# Patient Record
Sex: FEMALE | Race: WHITE | NOT HISPANIC OR LATINO | Employment: OTHER | URBAN - METROPOLITAN AREA
[De-identification: names, ages, dates, MRNs, and addresses within clinical notes are randomized per-mention and may not be internally consistent; named-entity substitution may affect disease eponyms.]

---

## 2018-04-19 ENCOUNTER — CONVERSION ENCOUNTER (OUTPATIENT)
Dept: MAMMOGRAPHY | Facility: HOSPITAL | Age: 73
End: 2018-04-19

## 2018-07-03 ENCOUNTER — CONVERSION ENCOUNTER (OUTPATIENT)
Dept: NEUROLOGY | Facility: CLINIC | Age: 73
End: 2018-07-03

## 2018-07-03 ENCOUNTER — OFFICE VISIT CONVERTED (OUTPATIENT)
Dept: NEUROSURGERY | Facility: CLINIC | Age: 73
End: 2018-07-03
Attending: PHYSICIAN ASSISTANT

## 2019-07-29 ENCOUNTER — HOSPITAL ENCOUNTER (OUTPATIENT)
Dept: MAMMOGRAPHY | Facility: HOSPITAL | Age: 74
Discharge: HOME | End: 2019-07-29
Attending: INTERNAL MEDICINE

## 2020-08-10 ENCOUNTER — HOSPITAL ENCOUNTER (OUTPATIENT)
Dept: MAMMOGRAPHY | Facility: HOSPITAL | Age: 75
Discharge: HOME | End: 2020-08-10
Attending: INTERNAL MEDICINE

## 2021-03-10 ENCOUNTER — OFFICE VISIT CONVERTED (OUTPATIENT)
Dept: ORTHOPEDIC SURGERY | Facility: CLINIC | Age: 76
End: 2021-03-10
Attending: ORTHOPAEDIC SURGERY

## 2021-03-22 ENCOUNTER — OFFICE VISIT CONVERTED (OUTPATIENT)
Dept: ORTHOPEDIC SURGERY | Facility: CLINIC | Age: 76
End: 2021-03-22
Attending: ORTHOPAEDIC SURGERY

## 2021-03-22 ENCOUNTER — CONVERSION ENCOUNTER (OUTPATIENT)
Dept: ORTHOPEDIC SURGERY | Facility: CLINIC | Age: 76
End: 2021-03-22

## 2021-04-06 ENCOUNTER — HOSPITAL ENCOUNTER (OUTPATIENT)
Dept: VACCINE CLINIC | Facility: HOSPITAL | Age: 76
Discharge: HOME | End: 2021-04-06
Attending: INTERNAL MEDICINE

## 2021-04-27 ENCOUNTER — HOSPITAL ENCOUNTER (OUTPATIENT)
Dept: VACCINE CLINIC | Facility: HOSPITAL | Age: 76
Discharge: HOME | End: 2021-04-27
Attending: INTERNAL MEDICINE

## 2021-05-10 NOTE — H&P
History and Physical      Patient Name: Marnie Parra   Patient ID: [id number]   Sex: Female   YOB: 1945    Primary Care Provider: Nicolas Velasquez MD   Referring Provider: Stanton Ty    Visit Date: March 10, 2021    Provider: Dennis Álvarez MD   Location: [facility]   Location Address: [address] [place] [address]  [id number]   Location Phone: [phone]          Chief Complaint  · Left knee pain      History Of Present Illness  Marnie Parra is a 75 year old /White female who presents today to [facility].      Patient presents today for an evaluation of right knee pain. Patient presents today with MRI results and X-rays results of her right knee. She states she had a cystectomy, because she had cancer, and that surgery left her with neuropathy since 2016. She states pain in her right knee since 2016. She states in the last year she has been having increasing right knee pain. She woke up in September to excessive swelling and heat on her right knee. Patient went and saw Dr. Rai. She had first 25cc aspirated from her right knee, with no relief. She had her knee aspirated again and they got off 27cc of fluid and received a steroid injection, with no relief. She states she is now on prednisone with no relief of pain in her right knee.       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; Vaginitis, atrophic         Past Surgical History  Breast biopsy, left breast; Cystoscopy; Hysterectomy         Medication List  acetaminophen 500 mg oral tablet; aspirin 81 mg oral tablet,delayed release (DR/EC); folic acid 1 mg oral tablet; gabapentin 100 mg oral capsule; Norvasc 2.5 mg oral tablet; Protonix 20 mg oral tablet,delayed release (DR/EC); Zoloft 25 mg oral tablet  "        Allergy List  Dilaudid; Levaquin; Neurontin; propranolol       Allergies Reconciled  Family Medical History  Heart Disease; Hypertension; Renal Calculus         Social History  Alcohol (Never); ; Retired; Tobacco (Current every day)         Review of Systems  · Constitutional  o Denies  o : fever, chills, weight loss  · Cardiovascular  o Denies  o : chest pain, shortness of breath  · Gastrointestinal  o Denies  o : liver disease, heartburn, nausea, blood in stools  · Genitourinary  o Denies  o : painful urination, blood in urine  · Integument  o Denies  o : rash, itching  · Neurologic  o Denies  o : headache, weakness, loss of consciousness  · Musculoskeletal  o Denies  o : painful, swollen joints  · Psychiatric  o Denies  o : drug/alcohol addiction, anxiety, depression      Vitals  Date Time BP Position Site L\R Cuff Size HR RR TEMP (F) WT  HT  BMI kg/m2 BSA m2 O2 Sat FR L/min FiO2 HC       03/10/2021 01:58 PM         150lbs 4oz 5'  8\" 22.85 1.81             Physical Examination  · Constitutional  o Appearance  o : well developed, well-nourished, no obvious deformities present  · Head and Face  o Head  o :   § Inspection  § : normocephalic  o Face  o :   § Inspection  § : no facial lesions  · Eyes  o Conjunctivae  o : conjunctivae normal  o Sclerae  o : sclerae white  · Ears, Nose, Mouth and Throat  o Ears  o :   § External Ears  § : appearance within normal limits  § Hearing  § : intact  o Nose  o :   § External Nose  § : appearance normal  · Neck  o Inspection/Palpation  o : normal appearance  o Range of Motion  o : full range of motion  · Respiratory  o Respiratory Effort  o : breathing unlabored  o Inspection of Chest  o : normal appearance  o Auscultation of Lungs  o : no audible wheezing or rales  · Cardiovascular  o Heart  o : regular rate  · Gastrointestinal  o Abdominal Examination  o : soft and non-tender  · Skin and Subcutaneous Tissue  o General Inspection  o : intact, no " rashes  · Psychiatric  o General  o : Alert and oriented x3  o Judgement and Insight  o : judgment and insight intact  o Mood and Affect  o : mood normal, affect appropriate  · Left Knee  o Inspection  o : Calf supple, non-tender. Ambulation assistance with a cane. Weight bearing. Sensation grossly intact. Neurovascular intact. Skin intact. Tender lateral joint line. Mild tenderness of the medial joint line. Swelling. No skin discoloration or atrophy. Stable to valgus/varus stress. Good strength in quadriceps, hamstrings, dorsiflexors, and plantar flexors. Full flexion and extension.   · In Office Procedures  o View  o : LAT/SUNRISE/STANDING  o Site  o : left knee  o Indication  o : left knee pain  o Study  o : X-rays ordered, taken in the office, and reviewed today.  o Xray  o : Moderate degenerative changes of the right knee. No fracture or dislocation.   o Comparative Data  o : No comparative data found  · Imaging  o Imaging  o : 12/29/2021 XRAY LEFT KNEE: Mild osteoarthritis with suggestion of joint effusion and tricompartmental joint space narrowing. [1/13/2021 [MRI LEFT KNEE] Small degenerative tear mid body segment lateral meniscus with a small free margin radial component and horizontal cleavage component. Moderate (up to 3.5 cm) amount of marrow edema posterior nonweightbearing lateral femoral condyle which is nonspecific and could represent bone contusion, altered stress, transient osteoporosis or early AVN. No subchondral fracture or collapse. Diffuse chondrosis lateral compartment with 1 cm focus of grade IV chondromalacia lateral tibial plateau and suspected small (5mm) foci of grade IV chondromalacia lateral femoral condyle. Small joint effusion and popliteal cyst with diffuse synovial thickening within the popliteal cyst.           Assessment  · Primary osteoarthritis of left knee     715.16/M17.12  · Left knee pain, unspecified chronicity     719.46/M25.562      Plan  · Orders  o Knee (Left) [facility]  Preferred View ([id number]) - 719.46/M25.562 - 03/10/2021  · Medications  o Medications have been Reconciled  o Transition of Care or Provider Policy  · Instructions  o Dr. Álvarez saw and examined the patient and agrees with plan.   o X-rays reviewed by Dr. Álvarez.  o Reviewed the patient's Past Medical, Social, and Family history as well as the ROS at today's visit, no changes.  o Call or return if worsening symptoms.  o Follow Up PRN.  o The above service was scribed by Felipa Gupta on my behalf and I attest to the accuracy of the note. fidel  o Discussed diagnosis and treatment plans with the patient. Patient opted to get approval for viscosupplementation approval.            Electronically Signed by: Felipa Gupta-, Other -Author on March 11, 2021 11:33:23 AM  Electronically Co-signed by: Dennis Álvarez MD -Reviewer on March 14, 2021 05:39:49 PM

## 2021-05-14 VITALS — WEIGHT: 150 LBS | HEIGHT: 68 IN | BODY MASS INDEX: 22.73 KG/M2

## 2021-05-14 VITALS — WEIGHT: 150.25 LBS | HEIGHT: 68 IN | BODY MASS INDEX: 22.77 KG/M2

## 2021-05-14 NOTE — PROGRESS NOTES
Progress Note      Patient Name: Marnie Parra   Patient ID: [id number]   Sex: Female   YOB: 1945    Primary Care Provider: Nicolas Velasquez MD   Referring Provider: Stanton Ty    Visit Date: March 22, 2021    Provider: Dennis Álvarez MD   Location: [facility]   Location Address: [address] [place] [address]  [id number]   Location Phone: [phone]          Chief Complaint  · Left Knee Osteoarthritis      History Of Present Illness  Marnie Parra is a 75 year old /White female who presents today to [facility].      Patient presents today for a follow-up of left knee pain, left knee osteoarthritis. Patient has a history of left knee osteoarthritis that she has been treating conservatively. She had a cystectomy, because she had cancer, and that surgery left her with neuropathy since 2016. She states pain in her left knee since 2016. She states in the last year she has been having increasing left knee pain. She woke up in September to excessive swelling and heat on her right knee. Patient went and saw Dr. Rai. She had first 25cc aspirated from her left knee, with no relief. She had her knee aspirated again and they got off 27cc of fluid and received a steroid injection, with no relief. She states she is now on prednisone with no relief of pain in her left knee. She presents today to receive a left knee injection today.       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; Vaginitis, atrophic         Past Surgical History  Breast biopsy, left breast; Cystoscopy; Hernia; Hysterectomy         Medication List  acetaminophen 500 mg oral tablet; aspirin 81 mg oral tablet,delayed release (DR/EC); folic acid 1 mg oral tablet; gabapentin 100 mg  "oral capsule; Norvasc 2.5 mg oral tablet; Protonix 20 mg oral tablet,delayed release (DR/EC); Zoloft 25 mg oral tablet         Allergy List  Dilaudid; Levaquin; Neurontin; propranolol       Allergies Reconciled  Family Medical History  Heart Disease; Hypertension; Renal Calculus; Family history of Arthritis         Social History  Alcohol (Never); Alcohol Use (Never); lives with spouse; ; .; Recreational Drug Use (Never); Retired; Retired.; Tobacco (Current every day)         Review of Systems  · Constitutional  o Denies  o : fever, chills, weight loss  · Cardiovascular  o Denies  o : chest pain, shortness of breath  · Gastrointestinal  o Denies  o : liver disease, heartburn, nausea, blood in stools  · Genitourinary  o Denies  o : painful urination, blood in urine  · Integument  o Denies  o : rash, itching  · Neurologic  o Denies  o : headache, weakness, loss of consciousness  · Musculoskeletal  o Denies  o : painful, swollen joints  · Psychiatric  o Denies  o : drug/alcohol addiction, anxiety, depression      Vitals  Date Time BP Position Site L\R Cuff Size HR RR TEMP (F) WT  HT  BMI kg/m2 BSA m2 O2 Sat FR L/min FiO2 HC       03/22/2021 01:06 PM         150lbs 0oz 5'  8\" 22.81 1.81             Physical Examination  · Constitutional  o Appearance  o : well developed, well-nourished, no obvious deformities present  · Head and Face  o Head  o :   § Inspection  § : normocephalic  o Face  o :   § Inspection  § : no facial lesions  · Eyes  o Conjunctivae  o : conjunctivae normal  o Sclerae  o : sclerae white  · Ears, Nose, Mouth and Throat  o Ears  o :   § External Ears  § : appearance within normal limits  § Hearing  § : intact  o Nose  o :   § External Nose  § : appearance normal  · Neck  o Inspection/Palpation  o : normal appearance  o Range of Motion  o : full range of motion  · Respiratory  o Respiratory Effort  o : breathing unlabored  o Inspection of Chest  o : normal appearance  o Auscultation of " Lungs  o : no audible wheezing or rales  · Cardiovascular  o Heart  o : regular rate  · Gastrointestinal  o Abdominal Examination  o : soft and non-tender  · Skin and Subcutaneous Tissue  o General Inspection  o : intact, no rashes  · Psychiatric  o General  o : Alert and oriented x3  o Judgement and Insight  o : judgment and insight intact  o Mood and Affect  o : mood normal, affect appropriate  · Left Knee  o Inspection  o : Calf supple, non-tender. Ambulation assistance with a cane. Weight bearing. Sensation grossly intact. Neurovascular intact. Skin intact. Tender lateral joint line. Mild tenderness of the medial joint line. Swelling. No skin discoloration or atrophy. Stable to valgus/varus stress. Good strength in quadriceps, hamstrings, dorsiflexors, and plantar flexors. Full flexion and extension.   · Injection Note/Aspiration Note  o Site  o : left knee   o Procedure  o : Procedure: After educating the patient, patient gave consent for procedure. After using Chloraprep, the joint space was injected. The patient tolerated the procedure well.   o Medication  o : Synvisc One 48 mg           Assessment  · Primary osteoarthritis of left knee     715.16/M17.12      Plan  · Orders  o Hyaluronan or derivative, Synvisc or Synvisc-One, for intra-misael () - 715.16/M17.12 - 03/23/2021   Lot [id number] Exp 09 2023 [employer] Administered by GAUTAM Álvarez MD  o Knee Intra-articular Injection without US Guidance [facility] ([id number]) - 715.16/M17.12 - 03/23/2021  · Medications  o Medications have been Reconciled  o Transition of Care or Provider Policy  · Instructions  o Dr. Álvarez saw and examined the patient and agrees with plan.   o Reviewed the patient's Past Medical, Social, and Family history as well as the ROS at today's visit, no changes.  o Call or return if worsening symptoms.  o Follow Up PRN.  o The above service was scribed by Felipa Gupta on my behalf and I attest to the accuracy of the note. fidel  o Discussed treatment plans  with the patient. Patient received a left knee injection and tolerated this procedure well.             Electronically Signed by: Felipa Gupta-, Other -Author on March 25, 2021 07:51:27 AM  Electronically Co-signed by: Dennis Álvarez MD -Reviewer on March 28, 2021 01:45:11 PM

## 2021-05-16 VITALS — BODY MASS INDEX: 25.46 KG/M2 | HEART RATE: 74 BPM | HEIGHT: 68 IN | WEIGHT: 168 LBS

## 2021-06-21 ENCOUNTER — TRANSCRIBE ORDERS (OUTPATIENT)
Dept: ADMINISTRATIVE | Facility: HOSPITAL | Age: 76
End: 2021-06-21

## 2021-06-21 DIAGNOSIS — Z12.31 VISIT FOR SCREENING MAMMOGRAM: Primary | ICD-10-CM

## 2021-07-19 ENCOUNTER — TRANSCRIBE ORDERS (OUTPATIENT)
Dept: ADMINISTRATIVE | Facility: HOSPITAL | Age: 76
End: 2021-07-19

## 2021-07-19 DIAGNOSIS — C67.9 MALIGNANT NEOPLASM OF URINARY BLADDER, UNSPECIFIED SITE (HCC): Primary | ICD-10-CM

## 2021-08-02 ENCOUNTER — HOSPITAL ENCOUNTER (OUTPATIENT)
Dept: CT IMAGING | Facility: HOSPITAL | Age: 76
Discharge: HOME | End: 2021-08-02
Admitting: UROLOGY

## 2021-08-02 DIAGNOSIS — C67.9 MALIGNANT NEOPLASM OF URINARY BLADDER, UNSPECIFIED SITE (HCC): ICD-10-CM

## 2021-08-02 LAB — CREAT BLDA-MCNC: 0.5 MG/DL

## 2021-08-02 PROCEDURE — 82565 ASSAY OF CREATININE: CPT

## 2021-08-02 PROCEDURE — 74178 CT ABD&PLV WO CNTR FLWD CNTR: CPT

## 2021-08-02 PROCEDURE — 0 IOPAMIDOL PER 1 ML: Performed by: UROLOGY

## 2021-08-02 RX ADMIN — IOPAMIDOL 100 ML: 755 INJECTION, SOLUTION INTRAVENOUS at 13:30

## 2021-08-11 ENCOUNTER — APPOINTMENT (OUTPATIENT)
Dept: MAMMOGRAPHY | Facility: HOSPITAL | Age: 76
End: 2021-08-11

## 2021-09-11 ENCOUNTER — HOSPITAL ENCOUNTER (OUTPATIENT)
Dept: MAMMOGRAPHY | Facility: HOSPITAL | Age: 76
Discharge: HOME | End: 2021-09-11
Admitting: INTERNAL MEDICINE

## 2021-09-11 DIAGNOSIS — Z12.31 VISIT FOR SCREENING MAMMOGRAM: ICD-10-CM

## 2021-09-11 PROCEDURE — 77067 SCR MAMMO BI INCL CAD: CPT

## 2021-09-11 PROCEDURE — 77063 BREAST TOMOSYNTHESIS BI: CPT

## 2021-10-04 ENCOUNTER — OFFICE VISIT (OUTPATIENT)
Dept: ORTHOPEDIC SURGERY | Facility: CLINIC | Age: 76
End: 2021-10-04

## 2021-10-04 VITALS — OXYGEN SATURATION: 96 % | HEIGHT: 68 IN | WEIGHT: 147 LBS | BODY MASS INDEX: 22.28 KG/M2 | HEART RATE: 91 BPM

## 2021-10-04 DIAGNOSIS — M17.12 PRIMARY OSTEOARTHRITIS OF LEFT KNEE: Primary | ICD-10-CM

## 2021-10-04 PROCEDURE — 20610 DRAIN/INJ JOINT/BURSA W/O US: CPT | Performed by: PHYSICIAN ASSISTANT

## 2021-10-04 RX ORDER — PANTOPRAZOLE SODIUM 40 MG/1
TABLET, DELAYED RELEASE ORAL
COMMUNITY
Start: 2021-09-29

## 2021-10-04 RX ORDER — ASPIRIN 81 MG/1
81 TABLET ORAL DAILY
COMMUNITY

## 2021-10-04 RX ORDER — AMLODIPINE BESYLATE 2.5 MG/1
TABLET ORAL
COMMUNITY
Start: 2021-07-05

## 2021-10-04 NOTE — PATIENT INSTRUCTIONS
Patient received Synvisc injection today. Recommend RICE therapy up to 48 hours following today's injection.  Follow up with any changes or concerns.

## 2021-10-04 NOTE — PROGRESS NOTES
"Chief Complaint  Pain of the Left Knee    Subjective          Marnie Parra presents to [facility] for follow-up of left knee pain, left knee osteoarthritis.  Patient conservatively manages her osteoarthritis with intermittent injections.  Patient has history of neuropathy following a procedure done in 2016.  She states she had good outcome following her last Synvisc injection in March 2021.  She presents today to receive left knee Synvisc.    Objective   Allergies   Allergen Reactions   • Propofol GI Intolerance   • Levofloxacin Other (See Comments) and Unknown - High Severity     TORE ROTATOR CUFF  Other reaction(s): Rotator cuff tear arthropathy  TORE ROTATOR CUFF     • Gabapentin Other (See Comments)   • Hydromorphone Hallucinations, Mental Status Change and Other (See Comments)     Dilaudid causes the patient to hallucinate.  Dilaudid causes the patient to hallucinate.     • Adhesive Tape Rash       Vital Signs:   Pulse 91   Ht 172.7 cm (68\")   Wt 66.7 kg (147 lb)   SpO2 96%   BMI 22.35 kg/m²       Physical Exam  Constitutional:       Appearance: Normal appearance. Patient is well-developed and normal weight.   HENT:      Head: Normocephalic.      Right Ear: Hearing and external ear normal.      Left Ear: Hearing and external ear normal.      Nose: Nose normal.   Eyes:      Conjunctiva/sclera: Conjunctivae normal.   Cardiovascular:      Rate and Rhythm: Normal rate.   Pulmonary:      Effort: Pulmonary effort is normal.      Breath sounds: No wheezing or rales.   Abdominal:      Palpations: Abdomen is soft.      Tenderness: There is no abdominal tenderness.   Musculoskeletal:      Cervical back: Normal range of motion.   Skin:     Findings: No rash.   Neurological:      Mental Status: Patient is alert and oriented to person, place, and time.   Psychiatric:         Mood and Affect: Mood and affect normal.         Judgment: Judgment normal.     Ortho Exam  Left knee: Tenderness " the medial joint line.  Tenderness lateral joint line.  Mild swelling across the joint.  Patient uses cane for ambulation assistance.  Sensation intact, neurovascular intact, posterior tibialis pulse 2+, dorsalis pedis pulse 2+.  Stable to varus/valgus stress.  AROM is 0 to 115 degrees of flexion.  Good muscle tone of the quadriceps, hamstrings and ankle flexors.  Full plantar/dorsiflexion.    Result Review :   The following data was reviewed by: ASHWIN Beyer on 10/04/2021:         Imaging Results (Most Recent)     None         Large Joint Arthrocentesis: L knee  Date/Time: 10/4/2021 1:18 PM  Consent given by: patient  Site marked: site marked  Timeout: Immediately prior to procedure a time out was called to verify the correct patient, procedure, equipment, support staff and site/side marked as required   Supporting Documentation  Indications: pain   Procedure Details  Location: knee - L knee  Preparation: Patient was prepped and draped in the usual sterile fashion  Needle gauge: 21G.  Medications administered: 48 mg hylan 48 MG/6ML  Patient tolerance: patient tolerated the procedure well with no immediate complications            Assessment and Plan    Problem List Items Addressed This Visit        Musculoskeletal and Injuries    Primary osteoarthritis of left knee - Primary    Relevant Orders    Large Joint Arthrocentesis: L knee          Follow Up   Return if symptoms worsen or fail to improve.  Patient Instructions   Patient received Synvisc injection today. Recommend RICE therapy up to 48 hours following today's injection.  Follow up with any changes or concerns.     Patient was given instructions and counseling regarding her condition or for health maintenance advice. Please see specific information pulled into the AVS if appropriate.

## 2021-10-12 ENCOUNTER — TRANSCRIBE ORDERS (OUTPATIENT)
Dept: ADMINISTRATIVE | Facility: HOSPITAL | Age: 76
End: 2021-10-12

## 2021-10-12 DIAGNOSIS — I63.9 IMPENDING CEREBROVASCULAR ACCIDENT (HCC): Primary | ICD-10-CM

## 2021-10-12 DIAGNOSIS — M54.50 LOW BACK PAIN, UNSPECIFIED BACK PAIN LATERALITY, UNSPECIFIED CHRONICITY, UNSPECIFIED WHETHER SCIATICA PRESENT: Primary | ICD-10-CM

## 2021-10-12 DIAGNOSIS — M50.00 CERVICAL DISC DISORDER WITH MYELOPATHY: ICD-10-CM

## 2021-10-25 ENCOUNTER — HOSPITAL ENCOUNTER (OUTPATIENT)
Dept: MRI IMAGING | Facility: HOSPITAL | Age: 76
Discharge: HOME | End: 2021-10-25

## 2021-10-25 DIAGNOSIS — M54.50 LOW BACK PAIN, UNSPECIFIED BACK PAIN LATERALITY, UNSPECIFIED CHRONICITY, UNSPECIFIED WHETHER SCIATICA PRESENT: ICD-10-CM

## 2021-10-25 DIAGNOSIS — I63.9 IMPENDING CEREBROVASCULAR ACCIDENT (HCC): ICD-10-CM

## 2021-10-25 DIAGNOSIS — M50.00 CERVICAL DISC DISORDER WITH MYELOPATHY: ICD-10-CM

## 2021-10-25 LAB — CREAT BLDA-MCNC: 0.6 MG/DL

## 2021-10-25 PROCEDURE — 72148 MRI LUMBAR SPINE W/O DYE: CPT

## 2021-10-25 PROCEDURE — 70553 MRI BRAIN STEM W/O & W/DYE: CPT

## 2021-10-25 PROCEDURE — 72141 MRI NECK SPINE W/O DYE: CPT

## 2021-10-25 PROCEDURE — 0 GADOBENATE DIMEGLUMINE 529 MG/ML SOLUTION: Performed by: PSYCHIATRY & NEUROLOGY

## 2021-10-25 PROCEDURE — 70544 MR ANGIOGRAPHY HEAD W/O DYE: CPT

## 2021-10-25 PROCEDURE — A9577 INJ MULTIHANCE: HCPCS | Performed by: PSYCHIATRY & NEUROLOGY

## 2021-10-25 PROCEDURE — 82565 ASSAY OF CREATININE: CPT

## 2021-10-25 PROCEDURE — 70547 MR ANGIOGRAPHY NECK W/O DYE: CPT

## 2021-10-25 RX ADMIN — GADOBENATE DIMEGLUMINE 14 ML: 529 INJECTION, SOLUTION INTRAVENOUS at 15:53

## 2021-10-26 ENCOUNTER — APPOINTMENT (OUTPATIENT)
Dept: MRI IMAGING | Facility: HOSPITAL | Age: 76
End: 2021-10-26

## 2022-04-26 ENCOUNTER — TELEPHONE (OUTPATIENT)
Dept: ORTHOPEDIC SURGERY | Facility: CLINIC | Age: 77
End: 2022-04-26

## 2022-05-13 ENCOUNTER — OFFICE VISIT (OUTPATIENT)
Dept: ORTHOPEDIC SURGERY | Facility: CLINIC | Age: 77
End: 2022-05-13

## 2022-05-13 VITALS — WEIGHT: 147 LBS | HEIGHT: 68 IN | BODY MASS INDEX: 22.28 KG/M2

## 2022-05-13 DIAGNOSIS — M17.12 PRIMARY OSTEOARTHRITIS OF LEFT KNEE: Primary | ICD-10-CM

## 2022-05-13 PROCEDURE — 20610 DRAIN/INJ JOINT/BURSA W/O US: CPT | Performed by: PHYSICIAN ASSISTANT

## 2022-05-13 NOTE — PROGRESS NOTES
"Chief Complaint  Follow-up of the Left Knee    Subjective          Marnie Parra presents to [facility] for follow-up of left knee pain, left knee osteoarthritis.  Patient is here today to receive Synvisc injection of the left knee.  She received her last Synvisc injection on 10/4/2021 with good outcome.  She is here today using 4-prong cane for ambulation assistance.  She has history of neuropathy following a procedure that was performed on her in 2016.    Objective   Allergies   Allergen Reactions   • Propofol GI Intolerance   • Levofloxacin Other (See Comments) and Unknown - High Severity     TORE ROTATOR CUFF  Other reaction(s): Rotator cuff tear arthropathy  TORE ROTATOR CUFF     • Gabapentin Other (See Comments)   • Hydromorphone Hallucinations, Mental Status Change and Other (See Comments)     Dilaudid causes the patient to hallucinate.  Dilaudid causes the patient to hallucinate.     • Adhesive Tape Rash       Vital Signs:   Ht 172.7 cm (68\")   Wt 66.7 kg (147 lb)   BMI 22.35 kg/m²       Physical Exam  Constitutional:       Appearance: Normal appearance. Patient is well-developed and normal weight.   HENT:      Head: Normocephalic.      Right Ear: Hearing and external ear normal.      Left Ear: Hearing and external ear normal.      Nose: Nose normal.   Eyes:      Conjunctiva/sclera: Conjunctivae normal.   Cardiovascular:      Rate and Rhythm: Normal rate.   Pulmonary:      Effort: Pulmonary effort is normal.      Breath sounds: No wheezing or rales.   Abdominal:      Palpations: Abdomen is soft.      Tenderness: There is no abdominal tenderness.   Musculoskeletal:      Cervical back: Normal range of motion.   Skin:     Findings: No rash.   Neurological:      Mental Status: Patient is alert and oriented to person, place, and time.   Psychiatric:         Mood and Affect: Mood and affect normal.         Judgment: Judgment normal.     Ortho Exam  Left knee: Skin dry and intact. "  Tenderness to the joint lines.  Mild swelling.  No discoloration.  Sensation grossly and pulses intact.  Neurovascular intact distally.  AROM is -3 to 115 degrees of flexion.  Full plantarflexion and dorsiflexion of the ankle.  Able to wiggle toes.    Result Review :   The following data was reviewed by: ASHWIN Beyer on 05/13/2022:         Imaging Results (Most Recent)     None         Large Joint Arthrocentesis  Date/Time: 5/13/2022 2:56 PM  Consent given by: patient  Site marked: site marked  Timeout: Immediately prior to procedure a time out was called to verify the correct patient, procedure, equipment, support staff and site/side marked as required   Supporting Documentation  Indications: pain   Procedure Details  Location: knee -   Needle gauge: 21G.  Medications administered: 48 mg hylan 48 MG/6ML  Patient tolerance: patient tolerated the procedure well with no immediate complications            Assessment and Plan    Problem List Items Addressed This Visit        Musculoskeletal and Injuries    Primary osteoarthritis of left knee - Primary          Follow Up   Return if symptoms worsen or fail to improve.  Patient Instructions   Left knee Synvisc injection given today.   Discussed the duration between injections.         Follow up with any problems or concerns.    Patient was given instructions and counseling regarding her condition or for health maintenance advice. Please see specific information pulled into the AVS if appropriate.

## 2022-05-13 NOTE — PATIENT INSTRUCTIONS
Left knee Synvisc injection given today.   Discussed the duration between injections.         Follow up with any problems or concerns.

## 2022-07-06 ENCOUNTER — TRANSCRIBE ORDERS (OUTPATIENT)
Dept: ADMINISTRATIVE | Facility: HOSPITAL | Age: 77
End: 2022-07-06

## 2022-07-06 DIAGNOSIS — C67.9 MALIGNANT NEOPLASM OF URINARY BLADDER, UNSPECIFIED SITE: Primary | ICD-10-CM

## 2022-08-01 ENCOUNTER — HOSPITAL ENCOUNTER (OUTPATIENT)
Dept: CT IMAGING | Facility: HOSPITAL | Age: 77
Discharge: HOME | End: 2022-08-01
Admitting: UROLOGY

## 2022-08-01 DIAGNOSIS — C67.9 MALIGNANT NEOPLASM OF URINARY BLADDER, UNSPECIFIED SITE: ICD-10-CM

## 2022-08-01 PROCEDURE — 0 IOPAMIDOL PER 1 ML: Performed by: UROLOGY

## 2022-08-01 PROCEDURE — 74178 CT ABD&PLV WO CNTR FLWD CNTR: CPT

## 2022-08-01 RX ADMIN — IOPAMIDOL 100 ML: 755 INJECTION, SOLUTION INTRAVENOUS at 11:45

## 2022-08-02 ENCOUNTER — TRANSCRIBE ORDERS (OUTPATIENT)
Dept: ADMINISTRATIVE | Facility: HOSPITAL | Age: 77
End: 2022-08-02

## 2022-08-02 DIAGNOSIS — R55 PRE-SYNCOPE: Primary | ICD-10-CM

## 2022-09-28 ENCOUNTER — APPOINTMENT (OUTPATIENT)
Dept: CARDIOLOGY | Facility: HOSPITAL | Age: 77
End: 2022-09-28

## 2022-10-03 ENCOUNTER — HOSPITAL ENCOUNTER (EMERGENCY)
Facility: HOSPITAL | Age: 77
Discharge: ANOTHER HOSPITAL | End: 2022-10-04
Attending: STUDENT IN AN ORGANIZED HEALTH CARE EDUCATION/TRAINING PROGRAM | Admitting: STUDENT IN AN ORGANIZED HEALTH CARE EDUCATION/TRAINING PROGRAM

## 2022-10-03 ENCOUNTER — APPOINTMENT (OUTPATIENT)
Dept: CT IMAGING | Facility: HOSPITAL | Age: 77
End: 2022-10-03

## 2022-10-03 DIAGNOSIS — N20.1 URETERAL STONE: Primary | ICD-10-CM

## 2022-10-03 LAB
ALBUMIN SERPL-MCNC: 4.1 G/DL (ref 3.5–5.2)
ALBUMIN/GLOB SERPL: 1.6 G/DL
ALP SERPL-CCNC: 81 U/L (ref 39–117)
ALT SERPL W P-5'-P-CCNC: 16 U/L (ref 1–33)
AMORPH URATE CRY URNS QL MICRO: ABNORMAL /HPF
ANION GAP SERPL CALCULATED.3IONS-SCNC: 10.6 MMOL/L (ref 5–15)
AST SERPL-CCNC: 17 U/L (ref 1–32)
BACTERIA UR QL AUTO: ABNORMAL /HPF
BASOPHILS # BLD AUTO: 0.05 10*3/MM3 (ref 0–0.2)
BASOPHILS NFR BLD AUTO: 0.3 % (ref 0–1.5)
BILIRUB SERPL-MCNC: 0.4 MG/DL (ref 0–1.2)
BILIRUB UR QL STRIP: NEGATIVE
BUN SERPL-MCNC: 18 MG/DL (ref 8–23)
BUN/CREAT SERPL: 19.8 (ref 7–25)
CALCIUM SPEC-SCNC: 9.1 MG/DL (ref 8.6–10.5)
CHLORIDE SERPL-SCNC: 103 MMOL/L (ref 98–107)
CLARITY UR: ABNORMAL
CO2 SERPL-SCNC: 24.4 MMOL/L (ref 22–29)
COLOR UR: YELLOW
CREAT SERPL-MCNC: 0.91 MG/DL (ref 0.57–1)
D-LACTATE SERPL-SCNC: 1.9 MMOL/L (ref 0.5–2)
DEPRECATED RDW RBC AUTO: 46.2 FL (ref 37–54)
EGFRCR SERPLBLD CKD-EPI 2021: 65.5 ML/MIN/1.73
EOSINOPHIL # BLD AUTO: 0.01 10*3/MM3 (ref 0–0.4)
EOSINOPHIL NFR BLD AUTO: 0.1 % (ref 0.3–6.2)
ERYTHROCYTE [DISTWIDTH] IN BLOOD BY AUTOMATED COUNT: 13.6 % (ref 12.3–15.4)
GLOBULIN UR ELPH-MCNC: 2.5 GM/DL
GLUCOSE SERPL-MCNC: 164 MG/DL (ref 65–99)
GLUCOSE UR STRIP-MCNC: NEGATIVE MG/DL
HCT VFR BLD AUTO: 42.4 % (ref 34–46.6)
HGB BLD-MCNC: 14.1 G/DL (ref 12–15.9)
HGB UR QL STRIP.AUTO: ABNORMAL
HOLD SPECIMEN: NORMAL
HOLD SPECIMEN: NORMAL
HYALINE CASTS UR QL AUTO: ABNORMAL /LPF
IMM GRANULOCYTES # BLD AUTO: 0.09 10*3/MM3 (ref 0–0.05)
IMM GRANULOCYTES NFR BLD AUTO: 0.5 % (ref 0–0.5)
KETONES UR QL STRIP: NEGATIVE
LEUKOCYTE ESTERASE UR QL STRIP.AUTO: ABNORMAL
LIPASE SERPL-CCNC: 23 U/L (ref 13–60)
LYMPHOCYTES # BLD AUTO: 0.65 10*3/MM3 (ref 0.7–3.1)
LYMPHOCYTES NFR BLD AUTO: 3.5 % (ref 19.6–45.3)
MCH RBC QN AUTO: 30.9 PG (ref 26.6–33)
MCHC RBC AUTO-ENTMCNC: 33.3 G/DL (ref 31.5–35.7)
MCV RBC AUTO: 92.8 FL (ref 79–97)
MONOCYTES # BLD AUTO: 0.89 10*3/MM3 (ref 0.1–0.9)
MONOCYTES NFR BLD AUTO: 4.8 % (ref 5–12)
NEUTROPHILS NFR BLD AUTO: 16.99 10*3/MM3 (ref 1.7–7)
NEUTROPHILS NFR BLD AUTO: 90.8 % (ref 42.7–76)
NITRITE UR QL STRIP: NEGATIVE
NRBC BLD AUTO-RTO: 0 /100 WBC (ref 0–0.2)
PH UR STRIP.AUTO: 7.5 [PH] (ref 5–8)
PLATELET # BLD AUTO: 284 10*3/MM3 (ref 140–450)
PMV BLD AUTO: 10 FL (ref 6–12)
POTASSIUM SERPL-SCNC: 4.9 MMOL/L (ref 3.5–5.2)
PROT SERPL-MCNC: 6.6 G/DL (ref 6–8.5)
PROT UR QL STRIP: NEGATIVE
RBC # BLD AUTO: 4.57 10*6/MM3 (ref 3.77–5.28)
RBC # UR STRIP: ABNORMAL /HPF
REF LAB TEST METHOD: ABNORMAL
SODIUM SERPL-SCNC: 138 MMOL/L (ref 136–145)
SP GR UR STRIP: <=1.005 (ref 1–1.03)
SQUAMOUS #/AREA URNS HPF: ABNORMAL /HPF
UROBILINOGEN UR QL STRIP: ABNORMAL
WBC # UR STRIP: ABNORMAL /HPF
WBC NRBC COR # BLD: 18.68 10*3/MM3 (ref 3.4–10.8)
WHOLE BLOOD HOLD COAG: NORMAL
WHOLE BLOOD HOLD SPECIMEN: NORMAL

## 2022-10-03 PROCEDURE — 96375 TX/PRO/DX INJ NEW DRUG ADDON: CPT

## 2022-10-03 PROCEDURE — 96374 THER/PROPH/DIAG INJ IV PUSH: CPT

## 2022-10-03 PROCEDURE — 0 IOPAMIDOL PER 1 ML: Performed by: NURSE PRACTITIONER

## 2022-10-03 PROCEDURE — 80053 COMPREHEN METABOLIC PANEL: CPT

## 2022-10-03 PROCEDURE — 83605 ASSAY OF LACTIC ACID: CPT

## 2022-10-03 PROCEDURE — 81001 URINALYSIS AUTO W/SCOPE: CPT

## 2022-10-03 PROCEDURE — 74177 CT ABD & PELVIS W/CONTRAST: CPT

## 2022-10-03 PROCEDURE — 36415 COLL VENOUS BLD VENIPUNCTURE: CPT

## 2022-10-03 PROCEDURE — 25010000002 LORAZEPAM PER 2 MG: Performed by: STUDENT IN AN ORGANIZED HEALTH CARE EDUCATION/TRAINING PROGRAM

## 2022-10-03 PROCEDURE — 83690 ASSAY OF LIPASE: CPT

## 2022-10-03 PROCEDURE — 85025 COMPLETE CBC W/AUTO DIFF WBC: CPT

## 2022-10-03 PROCEDURE — 99284 EMERGENCY DEPT VISIT MOD MDM: CPT

## 2022-10-03 PROCEDURE — 25010000002 KETOROLAC TROMETHAMINE PER 15 MG: Performed by: NURSE PRACTITIONER

## 2022-10-03 PROCEDURE — 63710000001 ONDANSETRON ODT 4 MG TABLET DISPERSIBLE: Performed by: NURSE PRACTITIONER

## 2022-10-03 PROCEDURE — 99283 EMERGENCY DEPT VISIT LOW MDM: CPT

## 2022-10-03 RX ORDER — LORAZEPAM 2 MG/ML
0.5 INJECTION INTRAMUSCULAR ONCE
Status: COMPLETED | OUTPATIENT
Start: 2022-10-03 | End: 2022-10-03

## 2022-10-03 RX ORDER — SODIUM CHLORIDE 0.9 % (FLUSH) 0.9 %
10 SYRINGE (ML) INJECTION AS NEEDED
Status: DISCONTINUED | OUTPATIENT
Start: 2022-10-03 | End: 2022-10-04 | Stop reason: HOSPADM

## 2022-10-03 RX ORDER — KETOROLAC TROMETHAMINE 15 MG/ML
15 INJECTION, SOLUTION INTRAMUSCULAR; INTRAVENOUS ONCE
Status: COMPLETED | OUTPATIENT
Start: 2022-10-03 | End: 2022-10-03

## 2022-10-03 RX ORDER — ONDANSETRON 2 MG/ML
4 INJECTION INTRAMUSCULAR; INTRAVENOUS ONCE
Status: DISCONTINUED | OUTPATIENT
Start: 2022-10-03 | End: 2022-10-03

## 2022-10-03 RX ORDER — MORPHINE SULFATE 2 MG/ML
2 INJECTION, SOLUTION INTRAMUSCULAR; INTRAVENOUS ONCE
Status: DISCONTINUED | OUTPATIENT
Start: 2022-10-03 | End: 2022-10-04 | Stop reason: HOSPADM

## 2022-10-03 RX ORDER — ONDANSETRON 4 MG/1
4 TABLET, ORALLY DISINTEGRATING ORAL ONCE
Status: COMPLETED | OUTPATIENT
Start: 2022-10-03 | End: 2022-10-03

## 2022-10-03 RX ADMIN — SODIUM CHLORIDE 1000 ML: 9 INJECTION, SOLUTION INTRAVENOUS at 23:45

## 2022-10-03 RX ADMIN — SODIUM CHLORIDE 1000 ML: 9 INJECTION, SOLUTION INTRAVENOUS at 20:49

## 2022-10-03 RX ADMIN — LORAZEPAM 0.5 MG: 2 INJECTION INTRAMUSCULAR; INTRAVENOUS at 21:23

## 2022-10-03 RX ADMIN — KETOROLAC TROMETHAMINE 15 MG: 15 INJECTION, SOLUTION INTRAMUSCULAR; INTRAVENOUS at 18:07

## 2022-10-03 RX ADMIN — ONDANSETRON 4 MG: 4 TABLET, ORALLY DISINTEGRATING ORAL at 17:44

## 2022-10-03 RX ADMIN — IOPAMIDOL 100 ML: 755 INJECTION, SOLUTION INTRAVENOUS at 19:49

## 2022-10-03 RX ADMIN — SODIUM CHLORIDE 1000 ML: 9 INJECTION, SOLUTION INTRAVENOUS at 18:07

## 2022-10-03 NOTE — ED PROVIDER NOTES
Time: 5:52 PM EDT  Chief Complaint:   Chief Complaint   Patient presents with   • Flank Pain   • Abdominal Pain   • Vomiting           History of Present Illness:  Patient is a 77 y.o. year old female who presents to the emergency department with patient presents with severe right flank pain that is radiating around to her right abdomen and groin since 10 AM.  She had a cystectomy in 2016 and has a urostomy and a [place] pouch.  This was done by Dr. Ruby at [facility].  She took Mobic and Tylenol but vomited it up.  She has been having nausea and vomiting since the pain started.  She is hypotensive and says she feels like she is going to pass out.    Pt was seen at [facility] for her bladder cancer. Pt reports nausea and vomiting. Pt reports 3 episode of vomiting since 10 am today.         Patient Care Team  Primary Care Provider: Nicolas Velasquez MD    Past Medical History:     Allergies   Allergen Reactions   • Propofol GI Intolerance   • Levofloxacin Other (See Comments) and Unknown - High Severity     TORE ROTATOR CUFF  Other reaction(s): Rotator cuff tear arthropathy  TORE ROTATOR CUFF     • Gabapentin Other (See Comments)   • Hydromorphone Hallucinations, Mental Status Change and Other (See Comments)     Dilaudid causes the patient to hallucinate.  Dilaudid causes the patient to hallucinate.     • Adhesive Tape Rash     Past Medical History:   Diagnosis Date   • Arthritis    • Cancer (HCC)    • GERD (gastroesophageal reflux disease)    • Hyperlipidemia    • Hypertension    • Kidney stone    • Stroke (HCC)      Past Surgical History:   Procedure Laterality Date   • ABDOMINAL SURGERY     • APPENDECTOMY     • COLONOSCOPY     • CYSTECTOMY      bladder removal, with pouch made from stomach   • HERNIA REPAIR     • HYSTERECTOMY     • KIDNEY STONE SURGERY     • TUBAL ABDOMINAL LIGATION       History reviewed. No pertinent family history.    Home Medications:  Prior to Admission medications    Medication  "Sig Start Date End Date Taking? Authorizing Provider   amLODIPine (NORVASC) 2.5 MG tablet  7/5/21   Pavel Sanon MD   aspirin 81 MG EC tablet aspirin 81 mg oral tablet,delayed release (DR/EC) take 1 tablet (81 mg) by oral route once daily   Active    ProviderPavel MD   MAGnesium-Oxide 400 (241.3 Mg) MG tablet tablet  7/29/21   Pavel Sanon MD   pantoprazole (PROTONIX) 40 MG EC tablet  9/29/21   Pavel Sanon MD        Social History:   Social History     Tobacco Use   • Smoking status: Current Every Day Smoker   • Smokeless tobacco: Never Used   Substance Use Topics   • Alcohol use: Never   • Drug use: Never         Review of Systems:  Review of Systems   Constitutional: Negative for chills and fever.   HENT: Negative for congestion, rhinorrhea and sore throat.    Eyes: Negative for pain and visual disturbance.   Respiratory: Negative for apnea, cough, chest tightness and shortness of breath.    Cardiovascular: Negative for chest pain and palpitations.   Gastrointestinal: Positive for abdominal pain, nausea and vomiting. Negative for diarrhea.   Genitourinary: Positive for flank pain. Negative for difficulty urinating and dysuria.   Musculoskeletal: Positive for back pain. Negative for joint swelling and myalgias.   Skin: Negative for color change.   Neurological: Negative for seizures and headaches.   Psychiatric/Behavioral: Negative.    All other systems reviewed and are negative.       Physical Exam:  BP 95/66 (BP Location: Right arm, Patient Position: Sitting)   Pulse 114   Temp 98.9 °F (37.2 °C) (Oral)   Resp 24   Ht 172.7 cm (68\")   Wt 60.8 kg (134 lb)   SpO2 94%   BMI 20.37 kg/m²     Physical Exam  Vitals and nursing note reviewed.   Constitutional:       General: She is in acute distress (Due to pain).      Appearance: Normal appearance. She is well-developed. She is ill-appearing. She is not toxic-appearing.   HENT:      Head: Normocephalic and atraumatic.      Jaw: " There is normal jaw occlusion.   Eyes:      General: Lids are normal.      Extraocular Movements: Extraocular movements intact.      Conjunctiva/sclera: Conjunctivae normal.      Pupils: Pupils are equal, round, and reactive to light.   Cardiovascular:      Rate and Rhythm: Normal rate and regular rhythm.      Pulses: Normal pulses.      Heart sounds: Normal heart sounds.   Pulmonary:      Effort: Pulmonary effort is normal. No respiratory distress.      Breath sounds: Normal breath sounds. No wheezing or rhonchi.   Abdominal:      General: Abdomen is flat.      Palpations: Abdomen is soft.      Tenderness: There is abdominal tenderness in the right lower quadrant. There is right CVA tenderness. There is no guarding or rebound.      Comments: Pt has a urostomy pouch.   Musculoskeletal:         General: Normal range of motion.      Cervical back: Normal range of motion and neck supple.      Right lower leg: No edema.      Left lower leg: No edema.   Skin:     General: Skin is warm and dry.   Neurological:      Mental Status: She is alert and oriented to person, place, and time. Mental status is at baseline.   Psychiatric:         Mood and Affect: Mood normal.                Medications in the Emergency Department:  Medications   ondansetron ODT (ZOFRAN-ODT) disintegrating tablet 4 mg (4 mg Oral Given 10/3/22 1744)   sodium chloride 0.9 % bolus 1,000 mL (0 mL Intravenous Stopped 10/3/22 2049)   ketorolac (TORADOL) injection 15 mg (15 mg Intravenous Given 10/3/22 1807)   iopamidol (ISOVUE-370) 76 % injection 100 mL (100 mL Intravenous Given 10/3/22 1949)   sodium chloride 0.9 % bolus 1,000 mL (0 mL Intravenous Stopped 10/3/22 2345)   LORazepam (ATIVAN) injection 0.5 mg (0.5 mg Intravenous Given 10/3/22 2123)   sodium chloride 0.9 % bolus 1,000 mL (0 mL Intravenous Stopped 10/4/22 0106)        Labs  Lab Results (last 24 hours)     ** No results found for the last 24 hours. **           Imaging:  No Radiology Exams  Resulted Within Past 24 Hours    Procedures:  Procedures    Progress  ED Course as of 10/05/22 0153   Mon Oct 03, 2022   7509 I spoke with Dr. Condon urology from [facility] who would like the patient to go to [facility]. [LQ]      ED Course User Index  [LQ] Hope Arnold MD                            The patient was initially evaluated in the triage area where orders were placed. The patient was later dispositioned by Hope Arnold MD.      The patient was advised to stay for completion of workup which includes but is not limited to communication of labs and radiological results, reassessment and plan. The patient was advised that leaving prior to disposition by a provider could result in critical findings that are not communicated to the patient.     Medical Decision Making:  MDM  Number of Diagnoses or Management Options  Ureteral stone  Diagnosis management comments: ddx: Kidney stone, pyelonephritis, RCC, MSK pain    Patient was found to have a stone in the right ureter.  Patient hypotensive and having significant pain.  UA negative for infection.  Patient has a leukocytosis of 19.  CMP shows normal creatinine, no significant electrolyte abnormality with normal sodium and potassium levels.  Patient was sent to [facility] where they stated they would likely do a nephrostomy tube.       Amount and/or Complexity of Data Reviewed  Clinical lab tests: reviewed  Tests in the radiology section of CPT®: reviewed  Tests in the medicine section of CPT®: reviewed  Obtain history from someone other than the patient: yes  Review and summarize past medical records: yes  Discuss the patient with other providers: yes  Independent visualization of images, tracings, or specimens: yes    Risk of Complications, Morbidity, and/or Mortality  General comments: I spent 30 minutes providing critical care exclusive of procedures.   Time includes: direct patient care, patient reassessment, coordination of patient care, interpretation  of data (laboratory data and imaging), review of patient's medical records, medical consultation, family consultation regarding treatment decisions and documentation of patient care.    Critical Care  Total time providing critical care: < 30 minutes           The following orders were placed after triage and evaluation:  Orders Placed This Encounter   Procedures   • CT Abdomen Pelvis With Contrast   • [place] Draw   • Comprehensive Metabolic Panel   • Lipase   • Urinalysis With Microscopic If Indicated (No Culture) - Urine, Clean Catch   • Lactic Acid, Plasma   • CBC Auto Differential   • Urinalysis, Microscopic Only - Urine, Clean Catch   • Undress & Gown   • CBC & Differential   • Green Top (Gel)   • Lavender Top   • Gold Top - SST   • Light Blue Top       Final diagnoses:   Ureteral stone          Disposition:  ED Disposition     ED Disposition   Transfer to Another Facility     Condition   --    Comment   --             This medical record created using voice recognition software.    Documentation assistance provided by Melissa Vazquez acting as scribe for No att. providers found. Information recorded by the scribe was done at my direction and has been verified and validated by me.              Melissa Vazquez  10/03/22 5537       Hope Arnold MD  10/05/22 5682

## 2022-10-04 VITALS
OXYGEN SATURATION: 94 % | SYSTOLIC BLOOD PRESSURE: 95 MMHG | HEART RATE: 114 BPM | WEIGHT: 134 LBS | DIASTOLIC BLOOD PRESSURE: 66 MMHG | RESPIRATION RATE: 24 BRPM | BODY MASS INDEX: 20.31 KG/M2 | TEMPERATURE: 98.9 F | HEIGHT: 68 IN

## 2022-11-01 ENCOUNTER — TRANSCRIBE ORDERS (OUTPATIENT)
Dept: ADMINISTRATIVE | Facility: HOSPITAL | Age: 77
End: 2022-11-01

## 2022-11-01 DIAGNOSIS — N20.2 CALCULUS OF KIDNEY AND URETER: Primary | ICD-10-CM

## 2022-11-21 ENCOUNTER — APPOINTMENT (OUTPATIENT)
Dept: CT IMAGING | Facility: HOSPITAL | Age: 77
End: 2022-11-21

## 2022-11-29 ENCOUNTER — TELEPHONE (OUTPATIENT)
Dept: ORTHOPEDIC SURGERY | Facility: CLINIC | Age: 77
End: 2022-11-29

## 2022-11-29 NOTE — TELEPHONE ENCOUNTER
Caller: RODNEY URENA    Relationship to patient: SELF    Best call back number: [phone]    Chief complaint: PATIENT REQUESTING APPT. FOR LEFT KNEE GEL INJECTION.    Type of visit: F/U-INJ    Requested date: ANYTIME EXCEPT FOR 12/7 AND 12/8/22

## 2022-11-30 ENCOUNTER — OFFICE VISIT (OUTPATIENT)
Dept: ORTHOPEDIC SURGERY | Facility: CLINIC | Age: 77
End: 2022-11-30

## 2022-11-30 VITALS — BODY MASS INDEX: 20.31 KG/M2 | WEIGHT: 134 LBS | HEIGHT: 68 IN | OXYGEN SATURATION: 98 % | HEART RATE: 92 BPM

## 2022-11-30 DIAGNOSIS — M17.12 PRIMARY OSTEOARTHRITIS OF LEFT KNEE: Primary | ICD-10-CM

## 2022-11-30 PROCEDURE — 20610 DRAIN/INJ JOINT/BURSA W/O US: CPT | Performed by: PHYSICIAN ASSISTANT

## 2022-11-30 RX ORDER — POTASSIUM CITRATE 10 MEQ/1
TABLET, EXTENDED RELEASE ORAL
COMMUNITY
Start: 2022-09-18

## 2022-11-30 NOTE — PATIENT INSTRUCTIONS
Patient elected to receive a left knee Synvisc injection today, able to repeat in 6 months if needed.     Follow up as needed.

## 2022-11-30 NOTE — PROGRESS NOTES
"Chief Complaint  Pain and Follow-up of the Left Knee    Subjective      Marnie Parra presents to [facility] for follow up of left knee pain, left knee osteoarthritis. Patient had Synvisc injection last on 05/13/22. She states the injection gave her relief until recently. She denies recent injury or trauma. She has history of neuropathy. She is using 4 pronged cane for ambulation assistance.     Objective   Allergies   Allergen Reactions   • Propofol GI Intolerance   • Levofloxacin Other (See Comments) and Unknown - High Severity     TORE ROTATOR CUFF  Other reaction(s): Rotator cuff tear arthropathy  TORE ROTATOR CUFF     • Gabapentin Other (See Comments)   • Hydromorphone Hallucinations, Mental Status Change and Other (See Comments)     Dilaudid causes the patient to hallucinate.  Dilaudid causes the patient to hallucinate.     • Propranolol Unknown - Low Severity   • Adhesive Tape Rash   • Celecoxib Unknown - High Severity and Rash       Vital Signs:   Pulse 92   Ht 172.7 cm (68\")   Wt 60.8 kg (134 lb)   SpO2 98%   BMI 20.37 kg/m²       Physical Exam    Constitutional: Awake, alert. Well nourished appearance.    Integumentary: Warm, dry, intact. No obvious rashes.    HENT: Atraumatic, normocephalic.   Respiratory: Non labored respirations .   Cardiovascular: Intact peripheral pulses.    Psychiatric: Normal mood and affect. A&O X3    Ortho Exam  LEFT KNEE: Sensation is grossly intact. Neurovascular intact distally. Skin dry, intact. No swelling or discoloration. Tenderness to the joint lines. Good muscle tone of the quadriceps, hamstrings and ankle flexors. Patient uses 4 prong cane for ambulation assistance.     Imaging Results (Most Recent)     None           Large Joint Arthrocentesis: L knee  Date/Time: 11/30/2022 2:34 PM  Consent given by: patient  Site marked: site marked  Timeout: Immediately prior to procedure a time out was called to verify the correct patient, " procedure, equipment, support staff and site/side marked as required   Supporting Documentation  Indications: pain   Procedure Details  Location: knee - L knee  Needle gauge: 21g.  Medications administered: 48 mg hylan 48 MG/6ML  Patient tolerance: patient tolerated the procedure well with no immediate complications              Assessment and Plan   Problem List Items Addressed This Visit        Musculoskeletal and Injuries    Primary osteoarthritis of left knee - Primary       Follow Up   Return if symptoms worsen or fail to improve.  Non smoker. No smoking cessation education necessary.     Patient Instructions   Patient elected to receive a left knee Synvisc injection today, able to repeat in 6 months if needed.     Follow up as needed.     Patient was given instructions and counseling regarding her condition or for health maintenance advice. Please see specific information pulled into the AVS if appropriate.

## 2022-12-07 ENCOUNTER — TRANSCRIBE ORDERS (OUTPATIENT)
Dept: ADMINISTRATIVE | Facility: HOSPITAL | Age: 77
End: 2022-12-07

## 2022-12-07 DIAGNOSIS — Z12.31 BREAST CANCER SCREENING BY MAMMOGRAM: Primary | ICD-10-CM

## 2023-01-30 ENCOUNTER — LAB (OUTPATIENT)
Dept: LAB | Facility: HOSPITAL | Age: 78
End: 2023-01-30
Payer: MEDICARE

## 2023-01-30 ENCOUNTER — TRANSCRIBE ORDERS (OUTPATIENT)
Dept: ADMINISTRATIVE | Facility: HOSPITAL | Age: 78
End: 2023-01-30
Payer: MEDICARE

## 2023-01-30 DIAGNOSIS — R68.89 MECHANICAL AND MOTOR PROBLEMS WITH INTERNAL ORGANS: ICD-10-CM

## 2023-01-30 DIAGNOSIS — R79.89 HYPOURICEMIA: Primary | ICD-10-CM

## 2023-01-30 DIAGNOSIS — R82.79 URINE CULTURE POSITIVE: ICD-10-CM

## 2023-01-30 DIAGNOSIS — R79.89 HYPOURICEMIA: ICD-10-CM

## 2023-01-30 DIAGNOSIS — N20.2 CALCULUS OF KIDNEY AND URETER: ICD-10-CM

## 2023-01-30 LAB
ANION GAP SERPL CALCULATED.3IONS-SCNC: 6.9 MMOL/L (ref 5–15)
BACTERIA UR QL AUTO: ABNORMAL /HPF
BASOPHILS # BLD AUTO: 0.08 10*3/MM3 (ref 0–0.2)
BASOPHILS NFR BLD AUTO: 1.3 % (ref 0–1.5)
BILIRUB UR QL STRIP: NEGATIVE
BUN SERPL-MCNC: 11 MG/DL (ref 8–23)
BUN/CREAT SERPL: 15.7 (ref 7–25)
CALCIUM SPEC-SCNC: 9.2 MG/DL (ref 8.6–10.5)
CHLORIDE SERPL-SCNC: 104 MMOL/L (ref 98–107)
CLARITY UR: ABNORMAL
CO2 SERPL-SCNC: 24.1 MMOL/L (ref 22–29)
COLOR UR: YELLOW
CREAT SERPL-MCNC: 0.7 MG/DL (ref 0.57–1)
DEPRECATED RDW RBC AUTO: 40.7 FL (ref 37–54)
EGFRCR SERPLBLD CKD-EPI 2021: 89.8 ML/MIN/1.73
EOSINOPHIL # BLD AUTO: 0.14 10*3/MM3 (ref 0–0.4)
EOSINOPHIL NFR BLD AUTO: 2.3 % (ref 0.3–6.2)
ERYTHROCYTE [DISTWIDTH] IN BLOOD BY AUTOMATED COUNT: 12.8 % (ref 12.3–15.4)
GLUCOSE SERPL-MCNC: 86 MG/DL (ref 65–99)
GLUCOSE UR STRIP-MCNC: NEGATIVE MG/DL
HCT VFR BLD AUTO: 44.3 % (ref 34–46.6)
HGB BLD-MCNC: 14.8 G/DL (ref 12–15.9)
HGB UR QL STRIP.AUTO: ABNORMAL
HYALINE CASTS UR QL AUTO: ABNORMAL /LPF
IMM GRANULOCYTES # BLD AUTO: 0.03 10*3/MM3 (ref 0–0.05)
IMM GRANULOCYTES NFR BLD AUTO: 0.5 % (ref 0–0.5)
KETONES UR QL STRIP: NEGATIVE
LEUKOCYTE ESTERASE UR QL STRIP.AUTO: ABNORMAL
LYMPHOCYTES # BLD AUTO: 1.37 10*3/MM3 (ref 0.7–3.1)
LYMPHOCYTES NFR BLD AUTO: 22.3 % (ref 19.6–45.3)
MCH RBC QN AUTO: 29.1 PG (ref 26.6–33)
MCHC RBC AUTO-ENTMCNC: 33.4 G/DL (ref 31.5–35.7)
MCV RBC AUTO: 87.2 FL (ref 79–97)
MONOCYTES # BLD AUTO: 0.42 10*3/MM3 (ref 0.1–0.9)
MONOCYTES NFR BLD AUTO: 6.8 % (ref 5–12)
NEUTROPHILS NFR BLD AUTO: 4.1 10*3/MM3 (ref 1.7–7)
NEUTROPHILS NFR BLD AUTO: 66.8 % (ref 42.7–76)
NITRITE UR QL STRIP: NEGATIVE
NRBC BLD AUTO-RTO: 0 /100 WBC (ref 0–0.2)
PH UR STRIP.AUTO: 8 [PH] (ref 5–8)
PLATELET # BLD AUTO: 274 10*3/MM3 (ref 140–450)
PMV BLD AUTO: 10.5 FL (ref 6–12)
POTASSIUM SERPL-SCNC: 5.7 MMOL/L (ref 3.5–5.2)
PROT UR QL STRIP: ABNORMAL
RBC # BLD AUTO: 5.08 10*6/MM3 (ref 3.77–5.28)
RBC # UR STRIP: ABNORMAL /HPF
REF LAB TEST METHOD: ABNORMAL
SODIUM SERPL-SCNC: 135 MMOL/L (ref 136–145)
SP GR UR STRIP: 1.01 (ref 1–1.03)
SQUAMOUS #/AREA URNS HPF: ABNORMAL /HPF
UROBILINOGEN UR QL STRIP: ABNORMAL
WBC # UR STRIP: ABNORMAL /HPF
WBC NRBC COR # BLD: 6.14 10*3/MM3 (ref 3.4–10.8)

## 2023-01-30 PROCEDURE — 81001 URINALYSIS AUTO W/SCOPE: CPT | Performed by: UROLOGY

## 2023-01-30 PROCEDURE — 87077 CULTURE AEROBIC IDENTIFY: CPT | Performed by: UROLOGY

## 2023-01-30 PROCEDURE — 80048 BASIC METABOLIC PNL TOTAL CA: CPT

## 2023-01-30 PROCEDURE — 36415 COLL VENOUS BLD VENIPUNCTURE: CPT

## 2023-01-30 PROCEDURE — 87086 URINE CULTURE/COLONY COUNT: CPT | Performed by: UROLOGY

## 2023-01-30 PROCEDURE — 87186 SC STD MICRODIL/AGAR DIL: CPT | Performed by: UROLOGY

## 2023-01-30 PROCEDURE — 85025 COMPLETE CBC W/AUTO DIFF WBC: CPT

## 2023-02-01 LAB — BACTERIA SPEC AEROBE CULT: ABNORMAL

## 2023-02-15 ENCOUNTER — HOSPITAL ENCOUNTER (OUTPATIENT)
Facility: HOSPITAL | Age: 78
Discharge: HOME | End: 2023-02-20
Attending: INTERNAL MEDICINE | Admitting: INTERNAL MEDICINE
Payer: MEDICARE

## 2023-02-15 ENCOUNTER — PREP FOR SURGERY (OUTPATIENT)
Dept: OTHER | Facility: HOSPITAL | Age: 78
End: 2023-02-15
Payer: MEDICARE

## 2023-02-15 DIAGNOSIS — R26.2 DIFFICULTY WALKING: Primary | ICD-10-CM

## 2023-02-15 PROBLEM — A41.9 SEPSIS (HCC): Status: ACTIVE | Noted: 2023-02-15

## 2023-02-15 LAB
ALBUMIN SERPL-MCNC: 3.6 G/DL (ref 3.5–5.2)
ALBUMIN/GLOB SERPL: 1.3 G/DL
ALP SERPL-CCNC: 90 U/L (ref 39–117)
ALT SERPL W P-5'-P-CCNC: 15 U/L (ref 1–33)
ANION GAP SERPL CALCULATED.3IONS-SCNC: 10.9 MMOL/L (ref 5–15)
AST SERPL-CCNC: 12 U/L (ref 1–32)
BILIRUB SERPL-MCNC: 0.5 MG/DL (ref 0–1.2)
BUN SERPL-MCNC: 33 MG/DL (ref 8–23)
BUN/CREAT SERPL: 19.4 (ref 7–25)
CALCIUM SPEC-SCNC: 9 MG/DL (ref 8.6–10.5)
CHLORIDE SERPL-SCNC: 101 MMOL/L (ref 98–107)
CO2 SERPL-SCNC: 24.1 MMOL/L (ref 22–29)
CREAT SERPL-MCNC: 1.7 MG/DL (ref 0.57–1)
DEPRECATED RDW RBC AUTO: 46 FL (ref 37–54)
EGFRCR SERPLBLD CKD-EPI 2021: 31 ML/MIN/1.73
ERYTHROCYTE [DISTWIDTH] IN BLOOD BY AUTOMATED COUNT: 14.2 % (ref 12.3–15.4)
GLOBULIN UR ELPH-MCNC: 2.8 GM/DL
GLUCOSE SERPL-MCNC: 112 MG/DL (ref 65–99)
HCT VFR BLD AUTO: 38.4 % (ref 34–46.6)
HGB BLD-MCNC: 12.6 G/DL (ref 12–15.9)
MCH RBC QN AUTO: 29.4 PG (ref 26.6–33)
MCHC RBC AUTO-ENTMCNC: 32.8 G/DL (ref 31.5–35.7)
MCV RBC AUTO: 89.7 FL (ref 79–97)
PLATELET # BLD AUTO: 244 10*3/MM3 (ref 140–450)
PMV BLD AUTO: 10.2 FL (ref 6–12)
POTASSIUM SERPL-SCNC: 3.9 MMOL/L (ref 3.5–5.2)
PROT SERPL-MCNC: 6.4 G/DL (ref 6–8.5)
RBC # BLD AUTO: 4.28 10*6/MM3 (ref 3.77–5.28)
SODIUM SERPL-SCNC: 136 MMOL/L (ref 136–145)
WBC NRBC COR # BLD: 15.52 10*3/MM3 (ref 3.4–10.8)

## 2023-02-15 PROCEDURE — 94799 UNLISTED PULMONARY SVC/PX: CPT

## 2023-02-15 PROCEDURE — 63710000001 SENNOSIDES-DOCUSATE 8.6-50 MG TABLET: Performed by: INTERNAL MEDICINE

## 2023-02-15 PROCEDURE — A9270 NON-COVERED ITEM OR SERVICE: HCPCS | Performed by: INTERNAL MEDICINE

## 2023-02-15 PROCEDURE — 96361 HYDRATE IV INFUSION ADD-ON: CPT

## 2023-02-15 PROCEDURE — 25010000002 PIPERACILLIN SOD-TAZOBACTAM PER 1 G: Performed by: INTERNAL MEDICINE

## 2023-02-15 PROCEDURE — 63710000001 HYDROCODONE-ACETAMINOPHEN 7.5-325 MG TABLET: Performed by: INTERNAL MEDICINE

## 2023-02-15 PROCEDURE — 63710000001 METOPROLOL SUCCINATE XL 25 MG TABLET SUSTAINED-RELEASE 24 HOUR: Performed by: INTERNAL MEDICINE

## 2023-02-15 PROCEDURE — 80053 COMPREHEN METABOLIC PANEL: CPT | Performed by: INTERNAL MEDICINE

## 2023-02-15 PROCEDURE — 96360 HYDRATION IV INFUSION INIT: CPT

## 2023-02-15 PROCEDURE — 85027 COMPLETE CBC AUTOMATED: CPT | Performed by: INTERNAL MEDICINE

## 2023-02-15 PROCEDURE — 87040 BLOOD CULTURE FOR BACTERIA: CPT | Performed by: INTERNAL MEDICINE

## 2023-02-15 RX ORDER — METOPROLOL SUCCINATE 25 MG/1
12.5 TABLET, EXTENDED RELEASE ORAL
Status: DISCONTINUED | OUTPATIENT
Start: 2023-02-15 | End: 2023-02-20 | Stop reason: HOSPADM

## 2023-02-15 RX ORDER — HYDROCODONE BITARTRATE AND ACETAMINOPHEN 7.5; 325 MG/1; MG/1
2 TABLET ORAL EVERY 4 HOURS PRN
Status: DISCONTINUED | OUTPATIENT
Start: 2023-02-15 | End: 2023-02-20 | Stop reason: HOSPADM

## 2023-02-15 RX ORDER — METOPROLOL SUCCINATE 25 MG/1
12.5 TABLET, EXTENDED RELEASE ORAL DAILY
COMMUNITY

## 2023-02-15 RX ORDER — SODIUM CHLORIDE 9 MG/ML
40 INJECTION, SOLUTION INTRAVENOUS AS NEEDED
Status: DISCONTINUED | OUTPATIENT
Start: 2023-02-15 | End: 2023-02-20 | Stop reason: HOSPADM

## 2023-02-15 RX ORDER — PANTOPRAZOLE SODIUM 40 MG/1
TABLET, DELAYED RELEASE ORAL
Status: CANCELLED | OUTPATIENT
Start: 2023-02-15

## 2023-02-15 RX ORDER — AMOXICILLIN 250 MG
2 CAPSULE ORAL NIGHTLY
Status: DISCONTINUED | OUTPATIENT
Start: 2023-02-15 | End: 2023-02-20 | Stop reason: HOSPADM

## 2023-02-15 RX ORDER — ACETAMINOPHEN 325 MG/1
650 TABLET ORAL EVERY 4 HOURS PRN
Status: DISCONTINUED | OUTPATIENT
Start: 2023-02-15 | End: 2023-02-20 | Stop reason: HOSPADM

## 2023-02-15 RX ORDER — MORPHINE SULFATE 1 MG/ML
1 INJECTION, SOLUTION EPIDURAL; INTRATHECAL; INTRAVENOUS EVERY 4 HOURS PRN
Status: CANCELLED | OUTPATIENT
Start: 2023-02-15 | End: 2023-02-22

## 2023-02-15 RX ORDER — ALUMINA, MAGNESIA, AND SIMETHICONE 2400; 2400; 240 MG/30ML; MG/30ML; MG/30ML
15 SUSPENSION ORAL EVERY 6 HOURS PRN
Status: DISCONTINUED | OUTPATIENT
Start: 2023-02-15 | End: 2023-02-20 | Stop reason: HOSPADM

## 2023-02-15 RX ORDER — HYDROCODONE BITARTRATE AND ACETAMINOPHEN 7.5; 325 MG/1; MG/1
2 TABLET ORAL EVERY 4 HOURS PRN
Status: CANCELLED | OUTPATIENT
Start: 2023-02-15 | End: 2023-02-22

## 2023-02-15 RX ORDER — SODIUM CHLORIDE 0.9 % (FLUSH) 0.9 %
10 SYRINGE (ML) INJECTION EVERY 12 HOURS SCHEDULED
Status: CANCELLED | OUTPATIENT
Start: 2023-02-15

## 2023-02-15 RX ORDER — SODIUM CHLORIDE 0.9 % (FLUSH) 0.9 %
10 SYRINGE (ML) INJECTION EVERY 12 HOURS SCHEDULED
Status: DISCONTINUED | OUTPATIENT
Start: 2023-02-15 | End: 2023-02-20 | Stop reason: HOSPADM

## 2023-02-15 RX ORDER — ACETAMINOPHEN 325 MG/1
650 TABLET ORAL EVERY 4 HOURS PRN
Status: CANCELLED | OUTPATIENT
Start: 2023-02-15

## 2023-02-15 RX ORDER — DEXTROSE AND SODIUM CHLORIDE 5; .45 G/100ML; G/100ML
100 INJECTION, SOLUTION INTRAVENOUS CONTINUOUS
Status: DISCONTINUED | OUTPATIENT
Start: 2023-02-15 | End: 2023-02-20 | Stop reason: HOSPADM

## 2023-02-15 RX ORDER — NALOXONE HCL 0.4 MG/ML
0.4 VIAL (ML) INJECTION
Status: DISCONTINUED | OUTPATIENT
Start: 2023-02-15 | End: 2023-02-20 | Stop reason: HOSPADM

## 2023-02-15 RX ORDER — SODIUM CHLORIDE 9 MG/ML
40 INJECTION, SOLUTION INTRAVENOUS AS NEEDED
Status: CANCELLED | OUTPATIENT
Start: 2023-02-15

## 2023-02-15 RX ORDER — ASPIRIN 81 MG/1
TABLET ORAL
Status: CANCELLED | OUTPATIENT
Start: 2023-02-15

## 2023-02-15 RX ORDER — SULFAMETHOXAZOLE AND TRIMETHOPRIM 800; 160 MG/1; MG/1
1 TABLET ORAL 2 TIMES DAILY
COMMUNITY
Start: 2023-02-14 | End: 2023-02-21

## 2023-02-15 RX ORDER — ALUMINA, MAGNESIA, AND SIMETHICONE 2400; 2400; 240 MG/30ML; MG/30ML; MG/30ML
15 SUSPENSION ORAL EVERY 6 HOURS PRN
Status: CANCELLED | OUTPATIENT
Start: 2023-02-15

## 2023-02-15 RX ORDER — SODIUM CHLORIDE 0.9 % (FLUSH) 0.9 %
10 SYRINGE (ML) INJECTION AS NEEDED
Status: CANCELLED | OUTPATIENT
Start: 2023-02-15

## 2023-02-15 RX ORDER — SODIUM CHLORIDE 0.9 % (FLUSH) 0.9 %
10 SYRINGE (ML) INJECTION AS NEEDED
Status: DISCONTINUED | OUTPATIENT
Start: 2023-02-15 | End: 2023-02-20 | Stop reason: HOSPADM

## 2023-02-15 RX ORDER — ASPIRIN 81 MG/1
81 TABLET ORAL DAILY
Status: DISCONTINUED | OUTPATIENT
Start: 2023-02-16 | End: 2023-02-20 | Stop reason: HOSPADM

## 2023-02-15 RX ORDER — ONDANSETRON 2 MG/ML
4 INJECTION INTRAMUSCULAR; INTRAVENOUS EVERY 6 HOURS PRN
Status: DISCONTINUED | OUTPATIENT
Start: 2023-02-15 | End: 2023-02-20 | Stop reason: HOSPADM

## 2023-02-15 RX ORDER — MORPHINE SULFATE 2 MG/ML
1 INJECTION, SOLUTION INTRAMUSCULAR; INTRAVENOUS EVERY 4 HOURS PRN
Status: DISCONTINUED | OUTPATIENT
Start: 2023-02-15 | End: 2023-02-20 | Stop reason: HOSPADM

## 2023-02-15 RX ORDER — HYDROCODONE BITARTRATE AND ACETAMINOPHEN 5; 325 MG/1; MG/1
1 TABLET ORAL EVERY 6 HOURS PRN
COMMUNITY
Start: 2023-02-14

## 2023-02-15 RX ORDER — DOCUSATE SODIUM, SENNOSIDES 50; 8.6 MG/1; MG/1
2 TABLET ORAL NIGHTLY PRN
COMMUNITY
Start: 2023-02-14

## 2023-02-15 RX ORDER — ONDANSETRON 2 MG/ML
4 INJECTION INTRAMUSCULAR; INTRAVENOUS EVERY 6 HOURS PRN
Status: CANCELLED | OUTPATIENT
Start: 2023-02-15

## 2023-02-15 RX ORDER — POTASSIUM CHLORIDE 750 MG/1
CAPSULE, EXTENDED RELEASE ORAL
Status: CANCELLED | OUTPATIENT
Start: 2023-02-15

## 2023-02-15 RX ORDER — NALOXONE HCL 0.4 MG/ML
0.4 VIAL (ML) INJECTION
Status: CANCELLED | OUTPATIENT
Start: 2023-02-15

## 2023-02-15 RX ADMIN — SENNOSIDES AND DOCUSATE SODIUM 2 TABLET: 8.6; 5 TABLET ORAL at 20:27

## 2023-02-15 RX ADMIN — DEXTROSE AND SODIUM CHLORIDE 100 ML/HR: 5; 450 INJECTION, SOLUTION INTRAVENOUS at 19:37

## 2023-02-15 RX ADMIN — METOPROLOL SUCCINATE 12.5 MG: 25 TABLET, EXTENDED RELEASE ORAL at 20:27

## 2023-02-15 RX ADMIN — Medication 10 ML: at 20:28

## 2023-02-15 RX ADMIN — TAZOBACTAM SODIUM AND PIPERACILLIN SODIUM 3.38 G: 375; 3 INJECTION, SOLUTION INTRAVENOUS at 19:36

## 2023-02-15 RX ADMIN — HYDROCODONE BITARTRATE AND ACETAMINOPHEN 2 TABLET: 7.5; 325 TABLET ORAL at 17:52

## 2023-02-15 NOTE — H&P
[facility]   HISTORY AND PHYSICAL    Patient Name: Marnie Parra  : 1945  MRN: [id number]  Primary Care Physician:  Nicolas Velasquez MD  Date of admission: 2/15/2023    Subjective   Subjective     Chief Complaint: patient had a procedure done on her urostomy has become septic and is therefore being admitted for further management she has extreme excruciating pain dehydrated nausea vomiting    HPI: nausea vomiting probably sepsis with IV antibiotics to be started    Marnie Parra is a 77 y.o. female history of bladder cancer had recent stone removed has become septic    Review of Systems   All systems were reviewed and negative except for:review    Personal History     Past Medical History:   Diagnosis Date   • Arthritis    • Cancer (HCC)    • GERD (gastroesophageal reflux disease)    • Hyperlipidemia    • Hypertension    • Kidney stone    • Stroke (HCC)        Past Surgical History:   Procedure Laterality Date   • ABDOMINAL SURGERY     • APPENDECTOMY     • COLONOSCOPY     • CYSTECTOMY      bladder removal, with pouch made from stomach   • HERNIA REPAIR     • HYSTERECTOMY     • KIDNEY STONE SURGERY     • TUBAL ABDOMINAL LIGATION         Family History: family history is not on file. Otherwise pertinent FHx was reviewed and not pertinent to current issue.    Social History:  reports that she has been smoking. She has never used smokeless tobacco. She reports that she does not drink alcohol and does not use drugs.    Home Medications:  amLODIPine, aspirin, magnesium oxide, pantoprazole, and potassium citrate      Allergies:  Allergies   Allergen Reactions   • Propofol GI Intolerance   • Levofloxacin Other (See Comments) and Unknown - High Severity     TORE ROTATOR CUFF  Other reaction(s): Rotator cuff tear arthropathy  TORE ROTATOR CUFF     • Gabapentin Other (See Comments)   • Hydromorphone Hallucinations, Mental Status Change and Other (See Comments)     Dilaudid causes the patient to  hallucinate.  Dilaudid causes the patient to hallucinate.     • Propranolol Unknown - Low Severity   • Adhesive Tape Rash   • Celecoxib Unknown - High Severity and Rash       Objective   Objective     Vitals:   Temp:  [98.6 °F (37 °C)] 98.6 °F (37 °C)  Heart Rate:  [98] 98  Resp:  [16] 16  BP: (88)/(42) 88/42  Physical Exam    Constitutional: alert oriented   Eyes: pupils equal reactive to light and accommodation   HENT:normal   Neck:normal   Respiratory: normal   Cardiovascular: normal   Gastrointestinal: abdominal wall inflamed   Musculoskeletal: normal   Neurologic: normal  Skin: Mariia    Result Review    Result Review:  I have personally reviewed the results from the time of this admission to 2/15/2023 17:20 EST and agree with these findings:  [x]  Laboratoryanemia  []  Microbiology  []  Radiology  []  EKG/Telemetry   []  Cardiology/Vascular   []  Pathology  []  Old records  []  Other:  Most notable findings include: anemia and probable sepsis    Assessment & Plan   Assessment / Plan     Brief Patient Summary:  Marnie Parra is a 77 y.o. female who post op septicemia    Active Hospital Problems:  There are no active hospital problems to display for this patient.      Plan:   IV fluids and antibiotics    DVT prophylaxis:  No DVT prophylaxis order currently exists.    CODE STATUS:         Admission Status:  I believe this patient meets inpatient status.    Electronically signed by Nicolas Velasquez MD, 02/15/23, 5:20 PM EST.      Part of this note may be an electronic transcription/translation of spoken language to printed text using the Dragon Dictation System.

## 2023-02-16 LAB
ALBUMIN SERPL-MCNC: 2.9 G/DL (ref 3.5–5.2)
ALBUMIN/GLOB SERPL: 1.1 G/DL
ALP SERPL-CCNC: 75 U/L (ref 39–117)
ALT SERPL W P-5'-P-CCNC: 11 U/L (ref 1–33)
ANION GAP SERPL CALCULATED.3IONS-SCNC: 8.7 MMOL/L (ref 5–15)
AST SERPL-CCNC: 12 U/L (ref 1–32)
BACTERIA UR QL AUTO: ABNORMAL /HPF
BASOPHILS # BLD AUTO: 0.03 10*3/MM3 (ref 0–0.2)
BASOPHILS NFR BLD AUTO: 0.3 % (ref 0–1.5)
BILIRUB SERPL-MCNC: 0.4 MG/DL (ref 0–1.2)
BILIRUB UR QL STRIP: NEGATIVE
BUN SERPL-MCNC: 31 MG/DL (ref 8–23)
BUN/CREAT SERPL: 26.3 (ref 7–25)
CALCIUM SPEC-SCNC: 8.2 MG/DL (ref 8.6–10.5)
CHLORIDE SERPL-SCNC: 103 MMOL/L (ref 98–107)
CLARITY UR: CLEAR
CO2 SERPL-SCNC: 22.3 MMOL/L (ref 22–29)
COLOR UR: YELLOW
CREAT SERPL-MCNC: 1.18 MG/DL (ref 0.57–1)
DEPRECATED RDW RBC AUTO: 46.4 FL (ref 37–54)
EGFRCR SERPLBLD CKD-EPI 2021: 48 ML/MIN/1.73
EOSINOPHIL # BLD AUTO: 0.08 10*3/MM3 (ref 0–0.4)
EOSINOPHIL NFR BLD AUTO: 0.7 % (ref 0.3–6.2)
ERYTHROCYTE [DISTWIDTH] IN BLOOD BY AUTOMATED COUNT: 14.4 % (ref 12.3–15.4)
GLOBULIN UR ELPH-MCNC: 2.6 GM/DL
GLUCOSE SERPL-MCNC: 108 MG/DL (ref 65–99)
GLUCOSE UR STRIP-MCNC: NEGATIVE MG/DL
HCT VFR BLD AUTO: 34.4 % (ref 34–46.6)
HGB BLD-MCNC: 11.6 G/DL (ref 12–15.9)
HGB UR QL STRIP.AUTO: ABNORMAL
HYALINE CASTS UR QL AUTO: ABNORMAL /LPF
IMM GRANULOCYTES # BLD AUTO: 0.07 10*3/MM3 (ref 0–0.05)
IMM GRANULOCYTES NFR BLD AUTO: 0.6 % (ref 0–0.5)
INR PPP: 1.12 (ref 0.86–1.15)
KETONES UR QL STRIP: NEGATIVE
LEUKOCYTE ESTERASE UR QL STRIP.AUTO: ABNORMAL
LYMPHOCYTES # BLD AUTO: 0.94 10*3/MM3 (ref 0.7–3.1)
LYMPHOCYTES NFR BLD AUTO: 8.7 % (ref 19.6–45.3)
MCH RBC QN AUTO: 30 PG (ref 26.6–33)
MCHC RBC AUTO-ENTMCNC: 33.7 G/DL (ref 31.5–35.7)
MCV RBC AUTO: 88.9 FL (ref 79–97)
MONOCYTES # BLD AUTO: 0.25 10*3/MM3 (ref 0.1–0.9)
MONOCYTES NFR BLD AUTO: 2.3 % (ref 5–12)
NEUTROPHILS NFR BLD AUTO: 87.4 % (ref 42.7–76)
NEUTROPHILS NFR BLD AUTO: 9.4 10*3/MM3 (ref 1.7–7)
NITRITE UR QL STRIP: NEGATIVE
NRBC BLD AUTO-RTO: 0 /100 WBC (ref 0–0.2)
PH UR STRIP.AUTO: 7 [PH] (ref 5–8)
PLATELET # BLD AUTO: 206 10*3/MM3 (ref 140–450)
PMV BLD AUTO: 10.3 FL (ref 6–12)
POTASSIUM SERPL-SCNC: 4 MMOL/L (ref 3.5–5.2)
PROT SERPL-MCNC: 5.5 G/DL (ref 6–8.5)
PROT UR QL STRIP: ABNORMAL
PROTHROMBIN TIME: 14.5 SECONDS (ref 11.8–14.9)
RBC # BLD AUTO: 3.87 10*6/MM3 (ref 3.77–5.28)
RBC # UR STRIP: ABNORMAL /HPF
REF LAB TEST METHOD: ABNORMAL
SODIUM SERPL-SCNC: 134 MMOL/L (ref 136–145)
SP GR UR STRIP: 1.01 (ref 1–1.03)
SQUAMOUS #/AREA URNS HPF: ABNORMAL /HPF
TRANS CELLS #/AREA URNS HPF: ABNORMAL /HPF
UROBILINOGEN UR QL STRIP: ABNORMAL
WBC # UR STRIP: ABNORMAL /HPF
WBC NRBC COR # BLD: 10.77 10*3/MM3 (ref 3.4–10.8)

## 2023-02-16 PROCEDURE — 80053 COMPREHEN METABOLIC PANEL: CPT | Performed by: INTERNAL MEDICINE

## 2023-02-16 PROCEDURE — A9270 NON-COVERED ITEM OR SERVICE: HCPCS | Performed by: INTERNAL MEDICINE

## 2023-02-16 PROCEDURE — 63710000001 ASPIRIN 81 MG TABLET DELAYED-RELEASE: Performed by: INTERNAL MEDICINE

## 2023-02-16 PROCEDURE — 96361 HYDRATE IV INFUSION ADD-ON: CPT

## 2023-02-16 PROCEDURE — 25010000002 PIPERACILLIN SOD-TAZOBACTAM PER 1 G: Performed by: INTERNAL MEDICINE

## 2023-02-16 PROCEDURE — 81001 URINALYSIS AUTO W/SCOPE: CPT | Performed by: INTERNAL MEDICINE

## 2023-02-16 PROCEDURE — 87086 URINE CULTURE/COLONY COUNT: CPT | Performed by: INTERNAL MEDICINE

## 2023-02-16 PROCEDURE — 85610 PROTHROMBIN TIME: CPT | Performed by: INTERNAL MEDICINE

## 2023-02-16 PROCEDURE — 63710000001 SENNOSIDES-DOCUSATE 8.6-50 MG TABLET: Performed by: INTERNAL MEDICINE

## 2023-02-16 PROCEDURE — 86738 MYCOPLASMA ANTIBODY: CPT | Performed by: INTERNAL MEDICINE

## 2023-02-16 PROCEDURE — 94799 UNLISTED PULMONARY SVC/PX: CPT

## 2023-02-16 PROCEDURE — 63710000001 HYDROCODONE-ACETAMINOPHEN 7.5-325 MG TABLET: Performed by: INTERNAL MEDICINE

## 2023-02-16 PROCEDURE — 85025 COMPLETE CBC W/AUTO DIFF WBC: CPT | Performed by: INTERNAL MEDICINE

## 2023-02-16 RX ADMIN — Medication 10 ML: at 09:51

## 2023-02-16 RX ADMIN — SENNOSIDES AND DOCUSATE SODIUM 2 TABLET: 8.6; 5 TABLET ORAL at 20:54

## 2023-02-16 RX ADMIN — Medication 10 ML: at 14:43

## 2023-02-16 RX ADMIN — TAZOBACTAM SODIUM AND PIPERACILLIN SODIUM 3.38 G: 375; 3 INJECTION, SOLUTION INTRAVENOUS at 09:51

## 2023-02-16 RX ADMIN — TAZOBACTAM SODIUM AND PIPERACILLIN SODIUM 3.38 G: 375; 3 INJECTION, SOLUTION INTRAVENOUS at 17:18

## 2023-02-16 RX ADMIN — DEXTROSE AND SODIUM CHLORIDE 100 ML/HR: 5; 450 INJECTION, SOLUTION INTRAVENOUS at 17:19

## 2023-02-16 RX ADMIN — TAZOBACTAM SODIUM AND PIPERACILLIN SODIUM 3.38 G: 375; 3 INJECTION, SOLUTION INTRAVENOUS at 01:07

## 2023-02-16 RX ADMIN — ASPIRIN 81 MG: 81 TABLET, COATED ORAL at 09:51

## 2023-02-16 RX ADMIN — DEXTROSE AND SODIUM CHLORIDE 100 ML/HR: 5; 450 INJECTION, SOLUTION INTRAVENOUS at 05:24

## 2023-02-16 RX ADMIN — HYDROCODONE BITARTRATE AND ACETAMINOPHEN 2 TABLET: 7.5; 325 TABLET ORAL at 12:06

## 2023-02-16 RX ADMIN — HYDROCODONE BITARTRATE AND ACETAMINOPHEN 2 TABLET: 7.5; 325 TABLET ORAL at 03:58

## 2023-02-16 RX ADMIN — Medication 10 ML: at 20:55

## 2023-02-16 RX ADMIN — HYDROCODONE BITARTRATE AND ACETAMINOPHEN 2 TABLET: 7.5; 325 TABLET ORAL at 20:54

## 2023-02-16 NOTE — PLAN OF CARE
Goal Outcome Evaluation:  Plan of Care Reviewed With: patient        Progress: no change. Continues IVF and antibiotic as ordered.

## 2023-02-16 NOTE — PLAN OF CARE
Goal Outcome Evaluation:           Progress: no change   VSS. Pt has rested this shift. Pt has no issues with self cath. Will continue to monitor

## 2023-02-16 NOTE — PAYOR COMM NOTE
"Rodney Parra (77 y.o. Female)     Date of Birth   1945    Social Security Number       Address   [address] [place] [address]    Home Phone   [phone]    MRN   [id number]       Mandaen   [facility]    Marital Status                               Admission Date   2/15/23    Admission Type   Urgent    Admitting Provider   Nicolas Velasquez MD    Attending Provider   Nicolas Velasquez MD    Department, Room/Bed   [facility] PROGRESSIVE CARE UNIT,        Discharge Date       Discharge Disposition       Discharge Destination                               Attending Provider: Nicolas Velasquez MD    Allergies: Propofol, Levofloxacin, Gabapentin, Hydromorphone, Propranolol, Adhesive Tape, Celecoxib    Isolation: None   Infection: None   Code Status: CPR    Ht: 172.7 cm (68\")   Wt: 62.6 kg (138 lb)    Admission Cmt: None   Principal Problem: Sepsis (HCC) [A41.9]                 Active Insurance as of 2/15/2023     Primary Coverage     Payor Plan Insurance Group Employer/Plan Group    [facility] MEDICARE REPLACEMENT [facility] MEDICARE REPLACEMENT [id number]     Payor Plan Address Payor Plan Phone Number Payor Plan Fax Number Effective Dates    [address] [address]   2021 - None Entered    [facility] [id number]       Subscriber Name Subscriber Birth Date Member ID       RODNEY PARRA 1945 [id number]                 Emergency Contacts      (Rel.) Home Phone Work Phone Mobile Phone    TAMMY PARRA (Spouse) [phone] -- --        Epic AUTO GENERATED @ [id number] [P]-FAXED CLINICALS [NOT A LOT AVAILABLE]     [facility] ,[facility] [phone]  F [phone]       History & Physical      Nicolas Velasuqez MD at 02/15/23 1720           [facility]   HISTORY AND PHYSICAL    Patient Name: Rodney Parra  : 1945  MRN: [id number]  Primary Care Physician:  Nicolas Velasquez MD  Date of admission: 2/15/2023    Subjective  "   Subjective     Chief Complaint: patient had a procedure done on her urostomy has become septic and is therefore being admitted for further management she has extreme excruciating pain dehydrated nausea vomiting    HPI: nausea vomiting probably sepsis with IV antibiotics to be started    Marnie Parra is a 77 y.o. female history of bladder cancer had recent stone removed has become septic    Review of Systems   All systems were reviewed and negative except for:review    Personal History     Past Medical History:   Diagnosis Date   • Arthritis    • Cancer (HCC)    • GERD (gastroesophageal reflux disease)    • Hyperlipidemia    • Hypertension    • Kidney stone    • Stroke (HCC)        Past Surgical History:   Procedure Laterality Date   • ABDOMINAL SURGERY     • APPENDECTOMY     • COLONOSCOPY     • CYSTECTOMY      bladder removal, with pouch made from stomach   • HERNIA REPAIR     • HYSTERECTOMY     • KIDNEY STONE SURGERY     • TUBAL ABDOMINAL LIGATION         Family History: family history is not on file. Otherwise pertinent FHx was reviewed and not pertinent to current issue.    Social History:  reports that she has been smoking. She has never used smokeless tobacco. She reports that she does not drink alcohol and does not use drugs.    Home Medications:  amLODIPine, aspirin, magnesium oxide, pantoprazole, and potassium citrate      Allergies:  Allergies   Allergen Reactions   • Propofol GI Intolerance   • Levofloxacin Other (See Comments) and Unknown - High Severity     TORE ROTATOR CUFF  Other reaction(s): Rotator cuff tear arthropathy  TORE ROTATOR CUFF     • Gabapentin Other (See Comments)   • Hydromorphone Hallucinations, Mental Status Change and Other (See Comments)     Dilaudid causes the patient to hallucinate.  Dilaudid causes the patient to hallucinate.     • Propranolol Unknown - Low Severity   • Adhesive Tape Rash   • Celecoxib Unknown - High Severity and Rash       Objective    Objective     Vitals:    Temp:  [98.6 °F (37 °C)] 98.6 °F (37 °C)  Heart Rate:  [98] 98  Resp:  [16] 16  BP: (88)/(42) 88/42  Physical Exam    Constitutional: alert oriented   Eyes: pupils equal reactive to light and accommodation   HENT:normal   Neck:normal   Respiratory: normal   Cardiovascular: normal   Gastrointestinal: abdominal wall inflamed   Musculoskeletal: normal   Neurologic: normal  Skin: Mariia    Result Review    Result Review:  I have personally reviewed the results from the time of this admission to 2/15/2023 17:20 EST and agree with these findings:  [x]  Laboratoryanemia  []  Microbiology  []  Radiology  []  EKG/Telemetry   []  Cardiology/Vascular   []  Pathology  []  Old records  []  Other:  Most notable findings include: anemia and probable sepsis    Assessment & Plan   Assessment / Plan     Brief Patient Summary:  Marnie Parra is a 77 y.o. female who post op septicemia    Active Hospital Problems:  There are no active hospital problems to display for this patient.      Plan:   IV fluids and antibiotics    DVT prophylaxis:  No DVT prophylaxis order currently exists.    CODE STATUS:         Admission Status:  I believe this patient meets inpatient status.    Electronically signed by Nicolas Velasquez MD, 02/15/23, 5:20 PM EST.      Part of this note may be an electronic transcription/translation of spoken language to printed text using the Dragon Dictation System.       Electronically signed by Nicolas Velasquez MD at 02/15/23 1722        Nicolas Velasquez MD   Physician  Medicine  Progress Notes      Signed  Date of Service:  23  Creation Time:  23     Signed        Expand All Collapse All     [facility]     Progress Note     Patient Name: Marnie Parra  : 1945  MRN: [id number]  Primary Care Physician:  Nicolas Velasquez MD  Date of admission: 2/15/2023        Subjective []Expand by Default     Subjective      Chief Complaint: Stable doing much better today but the erythema and  cellulitis continues cultures are still pending continue with antibiotic     HPI: Patient with very severe cellulitis probable urinary tract infection postsurgery     Review of Systems              All systems were reviewed and negative except for: Reviewed           Objective      Objective      Vitals:   Temp:  [98.2 °F (36.8 °C)-99.8 °F (37.7 °C)] 98.3 °F (36.8 °C)  Heart Rate:  [] 102  Resp:  [16] 16  BP: ()/(39-46) 106/41     Physical Exam                         Constitutional: Awake, alert              Eyes: PERRLA, sclerae anicteric, no conjunctival injection              HENT: NCAT, mucous membranes moist              Neck: Supple, no thyromegaly, no lymphadenopathy, trachea midline              Respiratory: Clear to auscultation bilaterally, nonlabored respirations               Cardiovascular: RRR, no murmurs, rubs, or gallops, palpable pedal pulses bilaterally              Gastrointestinal: Positive bowel sounds, soft, nontender, nondistended              Musculoskeletal: No bilateral ankle edema, no clubbing or cyanosis to extremities              Psychiatric: Appropriate affect, cooperative              Neurologic: Oriented x 3, strength symmetric in all extremities, Cranial Nerves grossly intact to confrontation, speech clear              Skin: No rashes   Cellulitis of the abdominal wall        Result Review       Result Review:  I have personally reviewed the results from the time of this admission to 2/16/2023 08:20 EST and agree with these findings:  [x]?  Laboratory abdominal wall cellulitis  []?  Microbiology  []?  Radiology  [x]?  EKG/Telemetry atrial fibrillation  []?  Cardiology/Vascular   []?  Pathology  []?  Old records  []?  Other:  Most notable findings include: Atrial fibrillation with cellulitis of the abdominal wall with history of bladder cancer           Assessment & Plan     Assessment / Plan      Brief Patient Summary:  Marnie Parra is a 77 y.o. female who patient  admitted postsurgery cellulitis and fever     Active Hospital Problems:       Active Hospital Problems     Diagnosis     • **Sepsis (HCC)           Plan:   Continue IV antibiotics cultures are pending     DVT prophylaxis:  Mechanical DVT prophylaxis orders are present.     CODE STATUS:   Level Of Support Discussed With: Patient  Code Status (Patient has no pulse and is not breathing): CPR (Attempt to Resuscitate)  Medical Interventions (Patient has pulse or is breathing): Full Support     Disposition:  I expect patient to be discharged after the patient has been stabilized.     Electronically signed by Nicolas Velasquez MD, 02/16/23, 8:20 AM EST.        Part of this note may be an electronic transcription/translation of spoken language to printed text using the Dragon Dictation System.

## 2023-02-16 NOTE — PROGRESS NOTES
[facility]     Progress Note    Patient Name: Marnie Parra  : 1945  MRN: [id number]  Primary Care Physician:  Nicolas Velasquez MD  Date of admission: 2/15/2023    Subjective   Subjective     Chief Complaint: Stable doing much better today but the erythema and cellulitis continues cultures are still pending continue with antibiotic    HPI: Patient with very severe cellulitis probable urinary tract infection postsurgery    Review of Systems   All systems were reviewed and negative except for: Reviewed    Objective   Objective     Vitals:   Temp:  [98.2 °F (36.8 °C)-99.8 °F (37.7 °C)] 98.3 °F (36.8 °C)  Heart Rate:  [] 102  Resp:  [16] 16  BP: ()/(39-46) 106/41    Physical Exam    Constitutional: Awake, alert   Eyes: PERRLA, sclerae anicteric, no conjunctival injection   HENT: NCAT, mucous membranes moist   Neck: Supple, no thyromegaly, no lymphadenopathy, trachea midline   Respiratory: Clear to auscultation bilaterally, nonlabored respirations    Cardiovascular: RRR, no murmurs, rubs, or gallops, palpable pedal pulses bilaterally   Gastrointestinal: Positive bowel sounds, soft, nontender, nondistended   Musculoskeletal: No bilateral ankle edema, no clubbing or cyanosis to extremities   Psychiatric: Appropriate affect, cooperative   Neurologic: Oriented x 3, strength symmetric in all extremities, Cranial Nerves grossly intact to confrontation, speech clear   Skin: No rashes   Cellulitis of the abdominal wall  Result Review    Result Review:  I have personally reviewed the results from the time of this admission to 2023 08:20 EST and agree with these findings:  [x]  Laboratory abdominal wall cellulitis  []  Microbiology  []  Radiology  [x]  EKG/Telemetry atrial fibrillation  []  Cardiology/Vascular   []  Pathology  []  Old records  []  Other:  Most notable findings include: Atrial fibrillation with cellulitis of the abdominal wall with history of bladder cancer    Assessment & Plan    Assessment / Plan     Brief Patient Summary:  Marnie Parra is a 77 y.o. female who patient admitted postsurgery cellulitis and fever    Active Hospital Problems:  Active Hospital Problems    Diagnosis    • **Sepsis (HCC)        Plan:   Continue IV antibiotics cultures are pending    DVT prophylaxis:  Mechanical DVT prophylaxis orders are present.    CODE STATUS:   Level Of Support Discussed With: Patient  Code Status (Patient has no pulse and is not breathing): CPR (Attempt to Resuscitate)  Medical Interventions (Patient has pulse or is breathing): Full Support    Disposition:  I expect patient to be discharged after the patient has been stabilized.    Electronically signed by Nicolas Velasquez MD, 02/16/23, 8:20 AM EST.      Part of this note may be an electronic transcription/translation of spoken language to printed text using the Dragon Dictation System.

## 2023-02-16 NOTE — CASE MANAGEMENT/SOCIAL WORK
Discharge Planning Assessment  MONY Lin     Patient Name: Marnie Parra  MRN: [id number]  Today's Date: 2/16/2023    Admit Date: 2/15/2023    Plan: MEt with pt and daughter at bedside to disucss dc  planning. Pt lives with her spouse and their adult son lives with them. Pt is independnet with ADLs is able to cook,clean and provide care for herself. Pt states she has to take frequent rest breaks but still gets everything she needs done. Pt  and daughter provide transporation and take pt to get groceries and appts without any issues. Pt denies any finacnial concers with medicaitons, bills or groceries. Pt has walker and cane at home that she rarely uses but has them just in case. Pt denies any additianl DME needs. Pt plans to dc back home with famiyl denying the need for rehab or HH services. CMSW will follow for any needs at dc   Discharge Needs Assessment     Row Name 02/16/23 0817       Living Environment    People in Home child(shane), adult;spouse    Name(s) of People in Home Jabier-spouse and adult son    Current Living Arrangements home    Potentially Unsafe Housing Conditions none    Primary Care Provided by self    Provides Primary Care For no one    Family Caregiver if Needed spouse;child(shane), adult    Family Caregiver Names Jabier and has daughter    Quality of Family Relationships helpful;involved;supportive    Able to Return to Prior Arrangements yes       Resource/Environmental Concerns    Resource/Environmental Concerns none    Transportation Concerns none       Food Insecurity    Within the past 12 months, you worried that your food would run out before you got the money to buy more. Never true    Within the past 12 months, the food you bought just didn't last and you didn't have money to get more. Never true       Transition Planning    Patient/Family Anticipates Transition to home with family    Patient/Family Anticipated Services at Transition none    Transportation Anticipated family or  friend will provide       Discharge Needs Assessment    Readmission Within the Last 30 Days no previous admission in last 30 days    Equipment Currently Used at Home cane, straight;walker, standard    Concerns to be Addressed no discharge needs identified;denies needs/concerns at this time    Anticipated Changes Related to Illness none    Equipment Needed After Discharge none    Provided Post Acute Provider List? N/A    Provided Post Acute Provider Quality & Resource List? N/A               Discharge Plan     Row Name 02/16/23 0818       Plan    Plan MEt with pt and daughter at bedside to disucss dc  planning. Pt lives with her spouse and their adult son lives with them. Pt is independnet with ADLs is able to cook,clean and provide care for herself. Pt states she has to take frequent rest breaks but still gets everything she needs done. Pt  and daughter provide transporation and take pt to get groceries and appts without any issues. Pt denies any finacnial concers with medicaitons, bills or groceries. Pt has walker and cane at home that she rarely uses but has them just in case. Pt denies any additianl DME needs. Pt plans to dc back home with thoryl denying the need for rehab or HH services. CMSW will follow for any needs at dc    Patient/Family in Agreement with Plan yes              Continued Care and Services - Admitted Since 2/15/2023    Coordination has not been started for this encounter.          Demographic Summary     Row Name 02/16/23 0816       General Information    Admission Type inpatient    Arrived From emergency department    Referral Source emergency department    Reason for Consult discharge planning    Preferred Language English       Contact Information    Permission Granted to Share Info With ;family/designee               Functional Status     Row Name 02/16/23 0817       Functional Status    Usual Activity Tolerance moderate    Current Activity Tolerance moderate        Physical Activity    On average, how many days per week do you engage in moderate to strenuous exercise (like a brisk walk)? 0 days    On average, how many minutes do you engage in exercise at this level? 0 min    Number of minutes of exercise per week 0       Assessment of Health Literacy    How often do you have someone help you read hospital materials? Never    How often do you have problems learning about your medical condition because of difficulty understanding written information? Never    How often do you have a problem understanding what is told to you about your medical condition? Never    How confident are you filling out medical forms by yourself? Quite a bit    Health Literacy Good       Functional Status, IADL    Medications independent    Meal Preparation independent    Housekeeping independent    Laundry independent    Shopping assistive equipment and person       Mental Status    General Appearance WDL WDL       Mental Status Summary    Recent Changes in Mental Status/Cognitive Functioning no changes               Psychosocial    No documentation.                Abuse/Neglect    No documentation.                Legal    No documentation.                Substance Abuse    No documentation.                Patient Forms    No documentation.                   Jacquelin Delaney RN

## 2023-02-17 ENCOUNTER — APPOINTMENT (OUTPATIENT)
Dept: CT IMAGING | Facility: HOSPITAL | Age: 78
End: 2023-02-17
Payer: MEDICARE

## 2023-02-17 ENCOUNTER — APPOINTMENT (OUTPATIENT)
Dept: GENERAL RADIOLOGY | Facility: HOSPITAL | Age: 78
End: 2023-02-17
Payer: MEDICARE

## 2023-02-17 PROBLEM — L03.90 CELLULITIS: Status: ACTIVE | Noted: 2023-02-17

## 2023-02-17 LAB
ANION GAP SERPL CALCULATED.3IONS-SCNC: 8.7 MMOL/L (ref 5–15)
BACTERIA SPEC AEROBE CULT: NO GROWTH
BASOPHILS # BLD AUTO: 0.02 10*3/MM3 (ref 0–0.2)
BASOPHILS NFR BLD AUTO: 0.3 % (ref 0–1.5)
BUN SERPL-MCNC: 17 MG/DL (ref 8–23)
BUN/CREAT SERPL: 20 (ref 7–25)
CALCIUM SPEC-SCNC: 8.3 MG/DL (ref 8.6–10.5)
CHLORIDE SERPL-SCNC: 106 MMOL/L (ref 98–107)
CO2 SERPL-SCNC: 23.3 MMOL/L (ref 22–29)
CREAT SERPL-MCNC: 0.85 MG/DL (ref 0.57–1)
DEPRECATED RDW RBC AUTO: 47.4 FL (ref 37–54)
EGFRCR SERPLBLD CKD-EPI 2021: 71.1 ML/MIN/1.73
EOSINOPHIL # BLD AUTO: 0.22 10*3/MM3 (ref 0–0.4)
EOSINOPHIL NFR BLD AUTO: 2.8 % (ref 0.3–6.2)
ERYTHROCYTE [DISTWIDTH] IN BLOOD BY AUTOMATED COUNT: 14.4 % (ref 12.3–15.4)
GLUCOSE SERPL-MCNC: 98 MG/DL (ref 65–99)
HCT VFR BLD AUTO: 36 % (ref 34–46.6)
HGB BLD-MCNC: 11.7 G/DL (ref 12–15.9)
IMM GRANULOCYTES # BLD AUTO: 0.03 10*3/MM3 (ref 0–0.05)
IMM GRANULOCYTES NFR BLD AUTO: 0.4 % (ref 0–0.5)
LYMPHOCYTES # BLD AUTO: 0.87 10*3/MM3 (ref 0.7–3.1)
LYMPHOCYTES NFR BLD AUTO: 11 % (ref 19.6–45.3)
M PNEUMO IGG SER IA-ACNC: 265 U/ML (ref 0–99)
M PNEUMO IGM SER IA-ACNC: <770 U/ML (ref 0–769)
MCH RBC QN AUTO: 29.3 PG (ref 26.6–33)
MCHC RBC AUTO-ENTMCNC: 32.5 G/DL (ref 31.5–35.7)
MCV RBC AUTO: 90.2 FL (ref 79–97)
MONOCYTES # BLD AUTO: 0.27 10*3/MM3 (ref 0.1–0.9)
MONOCYTES NFR BLD AUTO: 3.4 % (ref 5–12)
NEUTROPHILS NFR BLD AUTO: 6.53 10*3/MM3 (ref 1.7–7)
NEUTROPHILS NFR BLD AUTO: 82.1 % (ref 42.7–76)
NRBC BLD AUTO-RTO: 0 /100 WBC (ref 0–0.2)
PLATELET # BLD AUTO: 226 10*3/MM3 (ref 140–450)
PMV BLD AUTO: 10.4 FL (ref 6–12)
POTASSIUM SERPL-SCNC: 3.9 MMOL/L (ref 3.5–5.2)
RBC # BLD AUTO: 3.99 10*6/MM3 (ref 3.77–5.28)
SODIUM SERPL-SCNC: 138 MMOL/L (ref 136–145)
WBC NRBC COR # BLD: 7.94 10*3/MM3 (ref 3.4–10.8)

## 2023-02-17 PROCEDURE — G0378 HOSPITAL OBSERVATION PER HR: HCPCS

## 2023-02-17 PROCEDURE — 63710000001 METOPROLOL SUCCINATE XL 25 MG TABLET SUSTAINED-RELEASE 24 HOUR: Performed by: INTERNAL MEDICINE

## 2023-02-17 PROCEDURE — A9270 NON-COVERED ITEM OR SERVICE: HCPCS | Performed by: INTERNAL MEDICINE

## 2023-02-17 PROCEDURE — 25510000001 IOPAMIDOL PER 1 ML: Performed by: INTERNAL MEDICINE

## 2023-02-17 PROCEDURE — 0 IOPAMIDOL PER 1 ML: Performed by: INTERNAL MEDICINE

## 2023-02-17 PROCEDURE — 63710000001 ASPIRIN 81 MG TABLET DELAYED-RELEASE: Performed by: INTERNAL MEDICINE

## 2023-02-17 PROCEDURE — 63710000001 HYDROCODONE-ACETAMINOPHEN 7.5-325 MG TABLET: Performed by: INTERNAL MEDICINE

## 2023-02-17 PROCEDURE — 94799 UNLISTED PULMONARY SVC/PX: CPT

## 2023-02-17 PROCEDURE — 85025 COMPLETE CBC W/AUTO DIFF WBC: CPT | Performed by: INTERNAL MEDICINE

## 2023-02-17 PROCEDURE — 74177 CT ABD & PELVIS W/CONTRAST: CPT

## 2023-02-17 PROCEDURE — 71045 X-RAY EXAM CHEST 1 VIEW: CPT

## 2023-02-17 PROCEDURE — 80048 BASIC METABOLIC PNL TOTAL CA: CPT | Performed by: INTERNAL MEDICINE

## 2023-02-17 PROCEDURE — 25010000002 PIPERACILLIN SOD-TAZOBACTAM PER 1 G: Performed by: INTERNAL MEDICINE

## 2023-02-17 PROCEDURE — 63710000001 SENNOSIDES-DOCUSATE 8.6-50 MG TABLET: Performed by: INTERNAL MEDICINE

## 2023-02-17 PROCEDURE — 96361 HYDRATE IV INFUSION ADD-ON: CPT

## 2023-02-17 RX ADMIN — Medication 10 ML: at 20:47

## 2023-02-17 RX ADMIN — ASPIRIN 81 MG: 81 TABLET, COATED ORAL at 10:22

## 2023-02-17 RX ADMIN — METOPROLOL SUCCINATE 12.5 MG: 25 TABLET, EXTENDED RELEASE ORAL at 10:22

## 2023-02-17 RX ADMIN — DEXTROSE AND SODIUM CHLORIDE 100 ML/HR: 5; 450 INJECTION, SOLUTION INTRAVENOUS at 11:25

## 2023-02-17 RX ADMIN — TAZOBACTAM SODIUM AND PIPERACILLIN SODIUM 3.38 G: 375; 3 INJECTION, SOLUTION INTRAVENOUS at 00:44

## 2023-02-17 RX ADMIN — HYDROCODONE BITARTRATE AND ACETAMINOPHEN 2 TABLET: 7.5; 325 TABLET ORAL at 11:29

## 2023-02-17 RX ADMIN — DEXTROSE AND SODIUM CHLORIDE 100 ML/HR: 5; 450 INJECTION, SOLUTION INTRAVENOUS at 02:08

## 2023-02-17 RX ADMIN — TAZOBACTAM SODIUM AND PIPERACILLIN SODIUM 3.38 G: 375; 3 INJECTION, SOLUTION INTRAVENOUS at 09:43

## 2023-02-17 RX ADMIN — TAZOBACTAM SODIUM AND PIPERACILLIN SODIUM 3.38 G: 375; 3 INJECTION, SOLUTION INTRAVENOUS at 16:26

## 2023-02-17 RX ADMIN — HYDROCODONE BITARTRATE AND ACETAMINOPHEN 2 TABLET: 7.5; 325 TABLET ORAL at 07:16

## 2023-02-17 RX ADMIN — IOPAMIDOL 93 ML: 755 INJECTION, SOLUTION INTRAVENOUS at 19:22

## 2023-02-17 RX ADMIN — SENNOSIDES AND DOCUSATE SODIUM 2 TABLET: 8.6; 5 TABLET ORAL at 20:47

## 2023-02-17 RX ADMIN — Medication 10 ML: at 10:23

## 2023-02-17 RX ADMIN — HYDROCODONE BITARTRATE AND ACETAMINOPHEN 2 TABLET: 7.5; 325 TABLET ORAL at 20:48

## 2023-02-17 RX ADMIN — HYDROCODONE BITARTRATE AND ACETAMINOPHEN 2 TABLET: 7.5; 325 TABLET ORAL at 15:35

## 2023-02-17 NOTE — SIGNIFICANT NOTE
02/17/23 1130   Coping/Psychosocial   Observed Emotional State calm;cooperative   Verbalized Emotional State hopefulness   Trust Relationship/Rapport empathic listening provided   Involvement in Care interacting with patient   Additional Documentation Spiritual Care (Group)   Spiritual Care   Use of Spiritual Resources prayer;spirituality for coping, indicated strong use of   Spiritual Care Source  initiative   Spiritual Care Follow-Up follow-up, none required as presently assessed   Response to Spiritual Care engaged in conversation   Spiritual Care Interventions prayer support provided;supportive conversation provided   Spiritual Care Visit Type initial   Receptivity to Spiritual Care visit welcomed

## 2023-02-17 NOTE — PAYOR COMM NOTE
"Rodney Parra (77 y.o. Female)     Date of Birth   1945    Social Security Number       Address   [address] [address]    Home Phone   [phone]    MRN   [id number]       [facility]    Marital Status                               Admission Date   2/15/23    Admission Type   Urgent    Admitting Provider   Nicolas Velasquez MD    Attending Provider   Nicolas Velasquez MD    Department, Room/Bed   [address], [facility]       Discharge Date       Discharge Disposition       Discharge Destination                               Attending Provider: Nicolas Velasquez MD    Allergies: Propofol, Levofloxacin, Gabapentin, Hydromorphone, Propranolol, Adhesive Tape, Celecoxib    Isolation: None   Infection: None   Code Status: CPR    Ht: 172.7 cm (68\")   Wt: 62.6 kg (138 lb)    Admission Cmt: None   Principal Problem: Sepsis (HCC) [A41.9]                 Active Insurance as of 2/15/2023     Primary Coverage     Payor Plan Insurance Group Employer/Plan Group    [facility] MEDICARE REPLACEMENT [facility] MEDICARE REPLACEMENT [id number]     Payor Plan Address Payor Plan Phone Number Payor Plan Fax Number Effective Dates    [address] [address]   2021 - None Entered    [facility] [address]       Subscriber Name Subscriber Birth Date Member ID       RODNEY PARRA 1945 [id number]                 Emergency Contacts      (Rel.) Home Phone Work Phone Mobile Phone    TAMMY PARRA (Spouse) [phone] -- --          Order changed to obs+++++++++++++++                  [facility]  [facility] ,[facility] [phone]  F [phone]               Physician Progress Notes (last 24 hours)      Nicolas Velasquez MD at 23 0843           [facility]     Progress Note    Patient Name: Rodney Parra  : 1945  MRN: [id number]  Primary Care Physician:  Nicolas Velasquez MD  Date of admission: 2/15/2023    Subjective "   Subjective     Chief Complaint: Patient has drainage of urine the previous drainage site minimal urine in the pouch discussed the case with Dr. Ruby who recommended to continue with the IV antibiotic and observe for the next 2 to 3 days and then repeat a CT scan of the abdomen and pelvis    HPI: Patient with recent surgery because of the pouch stones having been removed still has got extensive cellulitis redness and angry looking skin    Review of Systems   All systems were reviewed and negative except for: Reviewed    Objective   Objective     Vitals:   Temp:  [97.5 °F (36.4 °C)-98.9 °F (37.2 °C)] 98.6 °F (37 °C)  Heart Rate:  [77-78] 78  Resp:  [14-16] 14  BP: (102-138)/(44-62) 138/56    Physical Exam    Constitutional: Awake, alert   Eyes: PERRLA, sclerae anicteric, no conjunctival injection   HENT: NCAT, mucous membranes moist   Neck: Supple, no thyromegaly, no lymphadenopathy, trachea midline   Respiratory: Clear to auscultation bilaterally, nonlabored respirations    Cardiovascular: RRR, no murmurs, rubs, or gallops, palpable pedal pulses bilaterally   Gastrointestinal: Positive bowel sounds, soft, nontender, nondistended   Musculoskeletal: No bilateral ankle edema, no clubbing or cyanosis to extremities   Psychiatric: Appropriate affect, cooperative   Neurologic: Oriented x 3, strength symmetric in all extremities, Cranial Nerves grossly intact to confrontation, speech clear   Skin: No rashes   Abdominal wall appears to be angry looking  Result Review    Result Review:  I have personally reviewed the results from the time of this admission to 2/17/2023 08:43 EST and agree with these findings:  [x]  Laboratory  [x]  Microbiology urine shows increased WBCs cultures are pending  []  Radiology  []  EKG/Telemetry   []  Cardiology/Vascular   []  Pathology  []  Old records  []  Other:  Most notable findings include: With increased WBCs RBCs in the urine    Assessment & Plan   Assessment / Plan     Brief Patient  Summary:  Marnie Parra is a 77 y.o. female who probable UTI and cellulitis    Active Hospital Problems:  Active Hospital Problems    Diagnosis    • **Sepsis (HCC)    • Cellulitis        Plan:   Continue IV antibiotics we will get a CT scan of the abdomen and pelvis as recommended by her urologist    DVT prophylaxis:  Mechanical DVT prophylaxis orders are present.    CODE STATUS:   Level Of Support Discussed With: Patient  Code Status (Patient has no pulse and is not breathing): CPR (Attempt to Resuscitate)  Medical Interventions (Patient has pulse or is breathing): Full Support    Disposition:  I expect patient to be discharged after the patient has been stabilized.    Electronically signed by Nicolas Velasquez MD, 02/17/23, 8:43 AM EST.      Part of this note may be an electronic transcription/translation of spoken language to printed text using the Dragon Dictation System.       Electronically signed by Nicolas Velasquez MD at 02/17/23 0837

## 2023-02-17 NOTE — PLAN OF CARE
Goal Outcome Evaluation:  Plan of Care Reviewed With: patient        Progress: improving. Decreased redness to abdominal area.

## 2023-02-17 NOTE — PROGRESS NOTES
[facility]     Progress Note    Patient Name: Marnie Parra  : 1945  MRN: [id number]  Primary Care Physician:  Nicolas Velasquez MD  Date of admission: 2/15/2023    Subjective   Subjective     Chief Complaint: Patient has drainage of urine the previous drainage site minimal urine in the pouch discussed the case with Dr. Ruby who recommended to continue with the IV antibiotic and observe for the next 2 to 3 days and then repeat a CT scan of the abdomen and pelvis    HPI: Patient with recent surgery because of the pouch stones having been removed still has got extensive cellulitis redness and angry looking skin    Review of Systems   All systems were reviewed and negative except for: Reviewed    Objective   Objective     Vitals:   Temp:  [97.5 °F (36.4 °C)-98.9 °F (37.2 °C)] 98.6 °F (37 °C)  Heart Rate:  [77-78] 78  Resp:  [14-16] 14  BP: (102-138)/(44-62) 138/56    Physical Exam    Constitutional: Awake, alert   Eyes: PERRLA, sclerae anicteric, no conjunctival injection   HENT: NCAT, mucous membranes moist   Neck: Supple, no thyromegaly, no lymphadenopathy, trachea midline   Respiratory: Clear to auscultation bilaterally, nonlabored respirations    Cardiovascular: RRR, no murmurs, rubs, or gallops, palpable pedal pulses bilaterally   Gastrointestinal: Positive bowel sounds, soft, nontender, nondistended   Musculoskeletal: No bilateral ankle edema, no clubbing or cyanosis to extremities   Psychiatric: Appropriate affect, cooperative   Neurologic: Oriented x 3, strength symmetric in all extremities, Cranial Nerves grossly intact to confrontation, speech clear   Skin: No rashes   Abdominal wall appears to be angry looking  Result Review    Result Review:  I have personally reviewed the results from the time of this admission to 2023 08:43 EST and agree with these findings:  [x]  Laboratory  [x]  Microbiology urine shows increased WBCs cultures are pending  []  Radiology  []  EKG/Telemetry   []   Cardiology/Vascular   []  Pathology  []  Old records  []  Other:  Most notable findings include: With increased WBCs RBCs in the urine    Assessment & Plan   Assessment / Plan     Brief Patient Summary:  Marnie Parra is a 77 y.o. female who probable UTI and cellulitis    Active Hospital Problems:  Active Hospital Problems    Diagnosis    • **Sepsis (HCC)    • Cellulitis        Plan:   Continue IV antibiotics we will get a CT scan of the abdomen and pelvis as recommended by her urologist    DVT prophylaxis:  Mechanical DVT prophylaxis orders are present.    CODE STATUS:   Level Of Support Discussed With: Patient  Code Status (Patient has no pulse and is not breathing): CPR (Attempt to Resuscitate)  Medical Interventions (Patient has pulse or is breathing): Full Support    Disposition:  I expect patient to be discharged after the patient has been stabilized.    Electronically signed by Nicolas Velasquez MD, 02/17/23, 8:43 AM EST.      Part of this note may be an electronic transcription/translation of spoken language to printed text using the Dragon Dictation System.

## 2023-02-18 PROCEDURE — A9270 NON-COVERED ITEM OR SERVICE: HCPCS | Performed by: INTERNAL MEDICINE

## 2023-02-18 PROCEDURE — 63710000001 ASPIRIN 81 MG TABLET DELAYED-RELEASE: Performed by: INTERNAL MEDICINE

## 2023-02-18 PROCEDURE — 63710000001 METOPROLOL SUCCINATE XL 25 MG TABLET SUSTAINED-RELEASE 24 HOUR: Performed by: INTERNAL MEDICINE

## 2023-02-18 PROCEDURE — 25010000002 PIPERACILLIN SOD-TAZOBACTAM PER 1 G: Performed by: INTERNAL MEDICINE

## 2023-02-18 PROCEDURE — 63710000001 SENNOSIDES-DOCUSATE 8.6-50 MG TABLET: Performed by: INTERNAL MEDICINE

## 2023-02-18 PROCEDURE — G0378 HOSPITAL OBSERVATION PER HR: HCPCS

## 2023-02-18 PROCEDURE — 63710000001 HYDROCODONE-ACETAMINOPHEN 7.5-325 MG TABLET: Performed by: INTERNAL MEDICINE

## 2023-02-18 PROCEDURE — 63710000001 ALUMINUM-MAGNESIUM HYDROXIDE-SIMETHICONE 400-400-40 MG/5ML SUSPENSION: Performed by: INTERNAL MEDICINE

## 2023-02-18 PROCEDURE — 94799 UNLISTED PULMONARY SVC/PX: CPT

## 2023-02-18 PROCEDURE — 96361 HYDRATE IV INFUSION ADD-ON: CPT

## 2023-02-18 RX ADMIN — DEXTROSE AND SODIUM CHLORIDE 100 ML/HR: 5; 450 INJECTION, SOLUTION INTRAVENOUS at 00:42

## 2023-02-18 RX ADMIN — TAZOBACTAM SODIUM AND PIPERACILLIN SODIUM 3.38 G: 375; 3 INJECTION, SOLUTION INTRAVENOUS at 09:08

## 2023-02-18 RX ADMIN — Medication 10 ML: at 09:09

## 2023-02-18 RX ADMIN — METOPROLOL SUCCINATE 12.5 MG: 25 TABLET, EXTENDED RELEASE ORAL at 09:09

## 2023-02-18 RX ADMIN — TAZOBACTAM SODIUM AND PIPERACILLIN SODIUM 3.38 G: 375; 3 INJECTION, SOLUTION INTRAVENOUS at 00:42

## 2023-02-18 RX ADMIN — ASPIRIN 81 MG: 81 TABLET, COATED ORAL at 09:09

## 2023-02-18 RX ADMIN — ALUMINUM HYDROXIDE, MAGNESIUM HYDROXIDE, AND DIMETHICONE 15 ML: 400; 400; 40 SUSPENSION ORAL at 06:39

## 2023-02-18 RX ADMIN — DEXTROSE AND SODIUM CHLORIDE 100 ML/HR: 5; 450 INJECTION, SOLUTION INTRAVENOUS at 16:17

## 2023-02-18 RX ADMIN — HYDROCODONE BITARTRATE AND ACETAMINOPHEN 2 TABLET: 7.5; 325 TABLET ORAL at 14:55

## 2023-02-18 RX ADMIN — HYDROCODONE BITARTRATE AND ACETAMINOPHEN 2 TABLET: 7.5; 325 TABLET ORAL at 23:05

## 2023-02-18 RX ADMIN — SENNOSIDES AND DOCUSATE SODIUM 2 TABLET: 8.6; 5 TABLET ORAL at 20:37

## 2023-02-18 RX ADMIN — TAZOBACTAM SODIUM AND PIPERACILLIN SODIUM 3.38 G: 375; 3 INJECTION, SOLUTION INTRAVENOUS at 17:49

## 2023-02-18 NOTE — PLAN OF CARE
Goal Outcome Evaluation:  Plan of Care Reviewed With: patient        Progress: no change  Outcome Evaluation: patient remains alert and oriented x4. medicated with PRN pain medication per MAR. Patient had complaints of visual changes, neuro consulted. MRI ordered, MRI form filled out and placed in the chart for tomorrow AM. Patient has redness and swelling to right side of abdomen, redness is outlined and MD is aware. Patient straight caths her urostomy PRN, tolerating well. No new issues at this time.

## 2023-02-18 NOTE — CONSULTS
"Nutrition Services    Patient Name: Marnie Parra  YOB: 1945  MRN: [id number]  Admission date: 2/15/2023      CLINICAL NUTRITION ASSESSMENT      Reason for Assessment  nurse admission screening   H&P:    Past Medical History:   Diagnosis Date   • Arthritis    • Cancer (HCC)    • GERD (gastroesophageal reflux disease)    • Hyperlipidemia    • Hypertension    • Kidney stone    • Stroke (HCC)         Current Problems:   Active Hospital Problems    Diagnosis    • **Sepsis (HCC)    • Cellulitis         Nutrition/Diet History         Narrative     RD consult triggered for chronic poor intake. Over the past 4 months, pt is wt stable. Over the past 9 months, pt w/ 6.1% decrease in wt. Per nursing documentation, pt averaging ~50% meal intake. Daughter present during RD visit. Both pt and daughter state pt's intake is fine. The real issue is her hernia and sometimes she has to only eat soft foods. Talked about small, frequent meals-pt already educated on this. Likes Boost. RD will order ONS and snacks.      Anthropometrics        Current Height, Weight Height: 172.7 cm (68\")  Weight: 62.6 kg (138 lb)   Current BMI Body mass index is 20.98 kg/m².       Weight Hx  Wt Readings from Last 30 Encounters:   02/15/23 1709 62.6 kg (138 lb)   11/30/22 1430 60.8 kg (134 lb)   10/03/22 1737 60.8 kg (134 lb)   05/13/22 1454 66.7 kg (147 lb)   10/04/21 1312 66.7 kg (147 lb)   03/22/21 0000 68 kg (150 lb)   03/10/21 0000 68.2 kg (150 lb 4 oz)   07/03/18 0000 76.2 kg (168 lb)            Wt Change Observation Wt stable x 4 months  6.1% decrease x 9 months     Estimated/Assessed Needs       Energy Requirements 30-35 kcal/kg   EST Needs (kcal/day) 1544-2897 kcal       Protein Requirements 1.2-1.5 g/kg   EST Daily Needs (g/day) 76-95 g       Fluid Requirements 25-30 ml/kg    Estimated Needs (mL/day) 2747-6421 ml     Labs/Medications         Pertinent Labs Reviewed.   Results from last 7 days   Lab Units 02/17/23  0447 " 02/16/23  0459 02/15/23  1738   SODIUM mmol/L 138 134* 136   POTASSIUM mmol/L 3.9 4.0 3.9   CHLORIDE mmol/L 106 103 101   CO2 mmol/L 23.3 22.3 24.1   BUN mg/dL 17 31* 33*   CREATININE mg/dL 0.85 1.18* 1.70*   CALCIUM mg/dL 8.3* 8.2* 9.0   BILIRUBIN mg/dL  --  0.4 0.5   ALK PHOS U/L  --  75 90   ALT (SGPT) U/L  --  11 15   AST (SGOT) U/L  --  12 12   GLUCOSE mg/dL 98 108* 112*     Results from last 7 days   Lab Units 02/17/23  0447   HEMOGLOBIN g/dL 11.7*   HEMATOCRIT % 36.0     No results found for: COVID19  No results found for: HGBA1C      Pertinent Medications Reviewed.     Current Nutrition Orders & Evaluation of Intake       Oral Nutrition     Current PO Diet Diet: Regular/House Diet; Texture: Regular Texture (IDDSI 7); Fluid Consistency: Thin (IDDSI 0)   Supplement      Malnutrition Severity Assessment                Nutrition Diagnosis         Nutrition Dx Problem 1 No nutrition diagnosis at this time     Nutrition Intervention         Boost Plus TID (1080 kcal, 42 g pro)     Medical Nutrition Therapy/Nutrition Education          Learner     Readiness Patient and Family  Acceptance     Method     Response Explanation  Verbalizes understanding     Monitor/Evaluation        Monitor PO intake, Supplement intake, Pertinent labs, POC/GOC     Nutrition Discharge Plan         To be determined     Electronically signed by:  Tricia Bueno RD  02/18/23 12:12 EST

## 2023-02-18 NOTE — CONSULTS
[facility]   Consult Note      Patient Name: Marnie Parra  : 1945  MRN: [id number]  Primary Care Physician:  Nicolas Velasquez MD  Date of admission: 2/15/2023    Subjective   Subjective     This 77 years old woman was seen upon the request of Nicolas Velasquez MD for evaluation.    Chief Complaint:   Visual Experiences    HPI:  She dated the onset of her illness sometime on 2023 when she started noticing that the things that she is seeing or moving.  The needles are moving from left to right.  She remember having had a mild nondescript headache yesterday 2023.  She is not complaining of any headache today.  There is no associated dizziness, nausea or vomiting.  There was no chest pains or abdominal pains.    Since 2022, she has had 3 episodes wherein she had severe pain in the low back region and pointed over to the lower thoracic upper lumbar region.  The pain radiated to the right inguinal region and across the abdomen on the right side.  In addition, she also experienced a numb sensation on the medial portion of the right thigh just below the right groin.  All of this lasted for about 1 to 2 weeks.  Went away spontaneously.  It recurred sometime in 2022 and again during the last week of 2023.    In 2022, she was found to have 2 kidney stones on the right side.  1 measures 9 mm the other measured 3 mm.  She went through surgery but the stones were never found.  She had a urostomy performed.  Sometime since the beginning of the second week of 2023, she had redness and swelling in the right flank of the abdomen and over the anterior abdominal wall.  It was said that she had cellulitis in this area.  She was started on Zosyn 3.375 g every 8 hours intravenously.  She started to improve and was scheduled to be discharged on 2023.  However, the discharge was held because of the visual experiences that she was having as  noted above.    Review of Systems    Difficulty in hearing as well as in speaking any swallowing.  There was no chest pain.      Past Medical History:   Diagnosis Date   • Arthritis    • Cancer (HCC)    • GERD (gastroesophageal reflux disease)    • Hyperlipidemia    • Hypertension    • Kidney stone    • Stroke (HCC)        Past Surgical History:   Procedure Laterality Date   • ABDOMINAL SURGERY     • APPENDECTOMY     • COLONOSCOPY     • CYSTECTOMY      bladder removal, with pouch made from stomach   • HERNIA REPAIR     • HYSTERECTOMY     • KIDNEY STONE SURGERY     • TUBAL ABDOMINAL LIGATION         Family History: family history is not on file. Otherwise pertinent FHx was reviewed and not pertinent to current issue.    Social History:  reports that she has been smoking cigarettes. She started smoking about 57 years ago. She has been smoking an average of 1 pack per day. She has never used smokeless tobacco. She reports that she does not drink alcohol and does not use drugs.    Psychosocial History: No known psychiatric difficulties.    Home Medications:  HYDROcodone-acetaminophen, amLODIPine, aspirin, magnesium oxide, metoprolol succinate XL, pantoprazole, potassium citrate, sennosides-docusate, and sulfamethoxazole-trimethoprim      Allergies:  Allergies   Allergen Reactions   • Levofloxacin Other (See Comments)     TORE ROTATOR CUFF  Other reaction(s): Rotator cuff tear arthropathy     • Propofol GI Intolerance   • Gabapentin Other (See Comments)   • Hydromorphone Hallucinations, Mental Status Change and Other (See Comments)     Dilaudid causes the patient to hallucinate.  Dilaudid causes the patient to hallucinate.     • Propranolol Unknown - Low Severity   • Adhesive Tape Rash   • Celecoxib Unknown - High Severity and Rash       Vitals:   Temp:  [97.7 °F (36.5 °C)-98.1 °F (36.7 °C)] 97.9 °F (36.6 °C)  Heart Rate:  [74-83] 79  Resp:  [16-18] 18  BP: (132-159)/(60-72) 147/69  Physical Exam   She was awake and  alert was not informed distress.  She was fairly developed and fairly nourished.  She is asthenic.    Her heart was regular but heart rate was 80/min.  There were no murmurs.  The lungs were clear.    The carotid pulses were 1+ and equal.  There were no bruits on either side.    There is slight neck swelling and increased temperature on the right anterior abdominal wall.    Neurological Examination:   Her responses were coherent and relevant.  She was aware of what was going on around her.  She has an insight to her condition.  She was able to understand and follow verbal commands.    I did not look into the fundus on either side because of the COVID-19 pandemic social distancing.    The pupils were 3 to 4 mm. They were round and equal reactive to light directly and consensually.  There was no extraocular muscle weakness.    She has a questionable left lower facial weakness.  Was able to hear normal conversational speech.    The strength of the sternomastoid and trapezius muscles was normal and symmetrical.  The uvula and the tongue were in the midline.    She has 5 to 10% left hemiparesis.    She has a reduction of sensation to pinprick in the left upper and lower extremities.    The deep tendon reflexes were hypoactive to absent on both sides.    She has slight difficulty in doing finger-to-nose test on both sides.  She was searching for her nose towards the end of the testing.      Result Review    Result Review:  I have personally reviewed the results from the time of this admission to 2/18/2023 16:28 EST and agree with these findings:  [x]  Laboratory  []  Microbiology  [x]  Radiology  []  EKG/Telemetry   []  Cardiology/Vascular   []  Pathology  []  Old records  []  Other:  Most notable findings include:   February 18, 2023.  I have the report of the CAT scan of the abdomen and pelvis which showed:  1. Status post cystectomy with [place] pouch and right lower quadrant urostomy.  Small amount of   gas , skin  thickening, and subcutaneous ill-defined fluid lateral to the urostomy in the right   lower quadrant abdominal wall.  These findings could be secondary to patient's reported recent   surgery to remove stones from the pouch, but superimposed infection and/or fistula are also   included in the differential.  No well-defined rim enhancing abscess is seen at this time.  2. Moderate right and mild left hydronephrosis without obstructing ureteral calculus seen at this   time.  There is a nonobstructing right renal calculus.  3. Small layering pleural effusions with suspected mild dependent atelectasis.      Impression:    Transient Ischemic Attack, along the basilar artery-posterior cerebral arteries.  Lower thoracic upper lumbar radiculopathy, right side  Genitofemoral neuralgia, right side  Cellulitis, right anterior abdominal wall        Plan:   We will get an MRI of the brain as well as the thoracic and lumbosacral spine.  We will also get an MR angiography of the neck and cerebral vessels.  Depending on the results of this, will advise accordingly.              Please note that portions of this note were completed with a voice recognition program.  Part of this note is an electric or electronic transcription/translation of spoken language to printed text using the dragon dictating system.    Electronically signed by Grisel Garcia Jr., MD, 02/18/23, 4:28 PM EST.

## 2023-02-18 NOTE — PROGRESS NOTES
[facility]     Progress Note    Patient Name: Marnie Parra  : 1945  MRN: [id number]  Primary Care Physician:  Nicolas Velasquez MD  Date of admission: 2/15/2023    Subjective   Subjective     Chief Complaint: I have discussed the case with Dr. Ruby who feels that in next 3 to 5 days he anticipates that leakage of fluid will stop she still has got redness swollen angry looking skin with cellulitis we will continue the antibiotic she is also having some problems with visual disturbances to she has some double vision and loss of vision Pediotic will get neurology consultation,    HPI: Continue antibiotics get neurology consultation today she has had kidneys stone removed recently in [place] from the pouch urine cultures so far negative    Review of Systems   All systems were reviewed and negative except for: Reviewed    Objective   Objective     Vitals:   Temp:  [97.7 °F (36.5 °C)-98.4 °F (36.9 °C)] 97.7 °F (36.5 °C)  Heart Rate:  [68-83] 83  Resp:  [16-18] 16  BP: (132-159)/(53-72) 132/72    Physical Exam    Constitutional: Awake, alert   Eyes: PERRLA, sclerae anicteric, no conjunctival injection   HENT: NCAT, mucous membranes moist   Neck: Supple, no thyromegaly, no lymphadenopathy, trachea midline   Respiratory: Clear to auscultation bilaterally, nonlabored respirations    Cardiovascular: RRR, no murmurs, rubs, or gallops, palpable pedal pulses bilaterally   Gastrointestinal: Positive bowel sounds, soft, nontender, nondistended   Musculoskeletal: No bilateral ankle edema, no clubbing or cyanosis to extremities   Psychiatric: Appropriate affect, cooperative   Neurologic: Oriented x 3, strength symmetric in all extremities, Cranial Nerves grossly intact to confrontation, speech clear   Skin: No rashes   No change right-sided chest wall inflamed angry looking tender  Result Review    Result Review:  I have personally reviewed the results from the time of this admission to 2023 10:37 EST and  agree with these findings:  [x]  Laboratory anemia of chronic disease  []  Microbiology  []  Radiology  []  EKG/Telemetry   []  Cardiology/Vascular   []  Pathology  []  Old records  []  Other:  Most notable findings include: Anemia of chronic disease    Assessment & Plan   Assessment / Plan     Brief Patient Summary:  Marnie Parra is a 77 y.o. female who patient with possible cellulitis but having some what appears to neurological issues and a neuro consultation will be obtained I have discussed the case with neurology    Active Hospital Problems:  Active Hospital Problems    Diagnosis    • **Sepsis (HCC)    • Cellulitis        Plan:   Neuro consultation continue antibiotic    DVT prophylaxis:  Mechanical DVT prophylaxis orders are present.    CODE STATUS:   Level Of Support Discussed With: Patient  Code Status (Patient has no pulse and is not breathing): CPR (Attempt to Resuscitate)  Medical Interventions (Patient has pulse or is breathing): Full Support    Disposition:  I expect patient to be discharged if neurologically stable then will discharge tomorrow.    Electronically signed by Nicolas Velasquez MD, 02/18/23, 10:37 AM EST.      Part of this note may be an electronic transcription/translation of spoken language to printed text using the Dragon Dictation System.

## 2023-02-19 ENCOUNTER — APPOINTMENT (OUTPATIENT)
Dept: MRI IMAGING | Facility: HOSPITAL | Age: 78
End: 2023-02-19
Payer: MEDICARE

## 2023-02-19 LAB
ANION GAP SERPL CALCULATED.3IONS-SCNC: 7.9 MMOL/L (ref 5–15)
BASOPHILS # BLD AUTO: 0.04 10*3/MM3 (ref 0–0.2)
BASOPHILS NFR BLD AUTO: 0.7 % (ref 0–1.5)
BUN SERPL-MCNC: 7 MG/DL (ref 8–23)
BUN/CREAT SERPL: 11.7 (ref 7–25)
CALCIUM SPEC-SCNC: 9 MG/DL (ref 8.6–10.5)
CHLORIDE SERPL-SCNC: 107 MMOL/L (ref 98–107)
CO2 SERPL-SCNC: 26.1 MMOL/L (ref 22–29)
CREAT SERPL-MCNC: 0.6 MG/DL (ref 0.57–1)
DEPRECATED RDW RBC AUTO: 44.4 FL (ref 37–54)
EGFRCR SERPLBLD CKD-EPI 2021: 93.2 ML/MIN/1.73
EOSINOPHIL # BLD AUTO: 0.29 10*3/MM3 (ref 0–0.4)
EOSINOPHIL NFR BLD AUTO: 5.1 % (ref 0.3–6.2)
ERYTHROCYTE [DISTWIDTH] IN BLOOD BY AUTOMATED COUNT: 13.7 % (ref 12.3–15.4)
GLUCOSE SERPL-MCNC: 120 MG/DL (ref 65–99)
HCT VFR BLD AUTO: 39.8 % (ref 34–46.6)
HGB BLD-MCNC: 13.1 G/DL (ref 12–15.9)
IMM GRANULOCYTES # BLD AUTO: 0.03 10*3/MM3 (ref 0–0.05)
IMM GRANULOCYTES NFR BLD AUTO: 0.5 % (ref 0–0.5)
LYMPHOCYTES # BLD AUTO: 0.95 10*3/MM3 (ref 0.7–3.1)
LYMPHOCYTES NFR BLD AUTO: 16.7 % (ref 19.6–45.3)
MCH RBC QN AUTO: 29.2 PG (ref 26.6–33)
MCHC RBC AUTO-ENTMCNC: 32.9 G/DL (ref 31.5–35.7)
MCV RBC AUTO: 88.6 FL (ref 79–97)
MONOCYTES # BLD AUTO: 0.46 10*3/MM3 (ref 0.1–0.9)
MONOCYTES NFR BLD AUTO: 8.1 % (ref 5–12)
NEUTROPHILS NFR BLD AUTO: 3.93 10*3/MM3 (ref 1.7–7)
NEUTROPHILS NFR BLD AUTO: 68.9 % (ref 42.7–76)
NRBC BLD AUTO-RTO: 0 /100 WBC (ref 0–0.2)
PLATELET # BLD AUTO: 276 10*3/MM3 (ref 140–450)
PMV BLD AUTO: 9.9 FL (ref 6–12)
POTASSIUM SERPL-SCNC: 3.9 MMOL/L (ref 3.5–5.2)
RBC # BLD AUTO: 4.49 10*6/MM3 (ref 3.77–5.28)
SODIUM SERPL-SCNC: 141 MMOL/L (ref 136–145)
WBC NRBC COR # BLD: 5.7 10*3/MM3 (ref 3.4–10.8)

## 2023-02-19 PROCEDURE — A9577 INJ MULTIHANCE: HCPCS | Performed by: INTERNAL MEDICINE

## 2023-02-19 PROCEDURE — G0378 HOSPITAL OBSERVATION PER HR: HCPCS

## 2023-02-19 PROCEDURE — 94799 UNLISTED PULMONARY SVC/PX: CPT

## 2023-02-19 PROCEDURE — 70547 MR ANGIOGRAPHY NECK W/O DYE: CPT

## 2023-02-19 PROCEDURE — A9270 NON-COVERED ITEM OR SERVICE: HCPCS | Performed by: INTERNAL MEDICINE

## 2023-02-19 PROCEDURE — 0 GADOBENATE DIMEGLUMINE 529 MG/ML SOLUTION: Performed by: INTERNAL MEDICINE

## 2023-02-19 PROCEDURE — 63710000001 ASPIRIN 81 MG TABLET DELAYED-RELEASE: Performed by: INTERNAL MEDICINE

## 2023-02-19 PROCEDURE — 96361 HYDRATE IV INFUSION ADD-ON: CPT

## 2023-02-19 PROCEDURE — 63710000001 METOPROLOL SUCCINATE XL 25 MG TABLET SUSTAINED-RELEASE 24 HOUR: Performed by: INTERNAL MEDICINE

## 2023-02-19 PROCEDURE — 72146 MRI CHEST SPINE W/O DYE: CPT

## 2023-02-19 PROCEDURE — 63710000001 SENNOSIDES-DOCUSATE 8.6-50 MG TABLET: Performed by: INTERNAL MEDICINE

## 2023-02-19 PROCEDURE — 63710000001 HYDROCODONE-ACETAMINOPHEN 7.5-325 MG TABLET: Performed by: INTERNAL MEDICINE

## 2023-02-19 PROCEDURE — 80048 BASIC METABOLIC PNL TOTAL CA: CPT | Performed by: INTERNAL MEDICINE

## 2023-02-19 PROCEDURE — 70544 MR ANGIOGRAPHY HEAD W/O DYE: CPT

## 2023-02-19 PROCEDURE — 25010000002 PIPERACILLIN SOD-TAZOBACTAM PER 1 G: Performed by: INTERNAL MEDICINE

## 2023-02-19 PROCEDURE — 70553 MRI BRAIN STEM W/O & W/DYE: CPT

## 2023-02-19 PROCEDURE — 85025 COMPLETE CBC W/AUTO DIFF WBC: CPT | Performed by: INTERNAL MEDICINE

## 2023-02-19 PROCEDURE — 72148 MRI LUMBAR SPINE W/O DYE: CPT

## 2023-02-19 RX ADMIN — TAZOBACTAM SODIUM AND PIPERACILLIN SODIUM 3.38 G: 375; 3 INJECTION, SOLUTION INTRAVENOUS at 00:28

## 2023-02-19 RX ADMIN — DEXTROSE AND SODIUM CHLORIDE 100 ML/HR: 5; 450 INJECTION, SOLUTION INTRAVENOUS at 13:31

## 2023-02-19 RX ADMIN — SENNOSIDES AND DOCUSATE SODIUM 2 TABLET: 8.6; 5 TABLET ORAL at 20:51

## 2023-02-19 RX ADMIN — DEXTROSE AND SODIUM CHLORIDE 100 ML/HR: 5; 450 INJECTION, SOLUTION INTRAVENOUS at 22:55

## 2023-02-19 RX ADMIN — DEXTROSE AND SODIUM CHLORIDE 100 ML/HR: 5; 450 INJECTION, SOLUTION INTRAVENOUS at 00:29

## 2023-02-19 RX ADMIN — HYDROCODONE BITARTRATE AND ACETAMINOPHEN 2 TABLET: 7.5; 325 TABLET ORAL at 15:46

## 2023-02-19 RX ADMIN — ASPIRIN 81 MG: 81 TABLET, COATED ORAL at 09:09

## 2023-02-19 RX ADMIN — Medication 10 ML: at 09:09

## 2023-02-19 RX ADMIN — TAZOBACTAM SODIUM AND PIPERACILLIN SODIUM 3.38 G: 375; 3 INJECTION, SOLUTION INTRAVENOUS at 17:29

## 2023-02-19 RX ADMIN — METOPROLOL SUCCINATE 12.5 MG: 25 TABLET, EXTENDED RELEASE ORAL at 09:09

## 2023-02-19 RX ADMIN — GADOBENATE DIMEGLUMINE 10 ML: 529 INJECTION, SOLUTION INTRAVENOUS at 11:14

## 2023-02-19 RX ADMIN — TAZOBACTAM SODIUM AND PIPERACILLIN SODIUM 3.38 G: 375; 3 INJECTION, SOLUTION INTRAVENOUS at 09:09

## 2023-02-19 NOTE — PROGRESS NOTES
[facility]     Progress Note    Patient Name: Marnie Parra  : 1945  MRN: [id number]  Primary Care Physician:  Nicolas Velasquez MD  Date of admission: 2/15/2023    Subjective   Subjective     Chief Complaint: Patient stable and doing well neurology consultation appreciated patient is getting neurological work-up to rule out possibility of stroke    HPI: Patient get an MRI of the brain today as well as of the thoracic spine as per neurology    Review of Systems   All systems were reviewed and negative except for: Reviewed    Objective   Objective     Vitals:   Temp:  [97.3 °F (36.3 °C)-98.1 °F (36.7 °C)] 97.7 °F (36.5 °C)  Heart Rate:  [65-79] 71  Resp:  [18] 18  BP: (120-161)/(56-69) 153/68    Physical Exam    Constitutional: Awake, alert   Eyes: PERRLA, sclerae anicteric, no conjunctival injection   HENT: NCAT, mucous membranes moist   Neck: Supple, no thyromegaly, no lymphadenopathy, trachea midline   Respiratory: Clear to auscultation bilaterally, nonlabored respirations    Cardiovascular: RRR, no murmurs, rubs, or gallops, palpable pedal pulses bilaterally   Gastrointestinal: Positive bowel sounds, soft, nontender, nondistended   Musculoskeletal: No bilateral ankle edema, no clubbing or cyanosis to extremities   Psychiatric: Appropriate affect, cooperative   Neurologic: Oriented x 3, strength symmetric in all extremities, Cranial Nerves grossly intact to confrontation, speech clear   Skin: No rashes   No change  Result Review    Result Review:  I have personally reviewed the results from the time of this admission to 2023 10:58 EST and agree with these findings:  [x]  Laboratory anemia of chronic disease  []  Microbiology  []  Radiology  []  EKG/Telemetry   []  Cardiology/Vascular   []  Pathology  []  Old records  []  Other:  Most notable findings include: Anemia of chronic disease    Assessment & Plan   Assessment / Plan     Brief Patient Summary:  Marnie Parra is a 77 y.o. female who  patient having neurological symptoms which are new possibility of stroke is being considered    Active Hospital Problems:  Active Hospital Problems    Diagnosis    • **Sepsis (HCC)    • Cellulitis        Plan:   As per neurology to get MRI    DVT prophylaxis:  Mechanical DVT prophylaxis orders are present.    CODE STATUS:   Level Of Support Discussed With: Patient  Code Status (Patient has no pulse and is not breathing): CPR (Attempt to Resuscitate)  Medical Interventions (Patient has pulse or is breathing): Full Support    Disposition:  I expect patient to be discharged after the patient has stabilized.    Electronically signed by Nicolas Velasquez MD, 02/19/23, 10:58 AM EST.      Part of this note may be an electronic transcription/translation of spoken language to printed text using the Dragon Dictation System.

## 2023-02-19 NOTE — PLAN OF CARE
Goal Outcome Evaluation:  Plan of Care Reviewed With: patient        Progress: no change  Outcome Evaluation: patient remains alert and oriented x4. medicated with PRN pain medication per MAR. MRI performed, patient tolerated well. EEG to be performed tomorrow, then possible d/c. No new issues at this time.

## 2023-02-19 NOTE — PROGRESS NOTES
[facility]     Progress Note             Patient Name: Marnie Parra  : 1945  MRN: [id number]  Primary Care Physician:  Nicolas Velasquez MD  Date of admission: 2/15/2023        Present illness:  She is about the same.  She still she states are moving in front of her.  There is no dizziness per se.  There is no numbness or weakness anywhere.    There were no extraocular muscle weakness but no facial asymmetry.  Facial weakness.  The strength in both upper and lower extremities were normal and equal.    Review of Systems  No headache, dizziness, nausea or vomiting.  No chest pains or abdominal pains.      Past Medical History:   Diagnosis Date   • Arthritis    • Cancer (HCC)    • GERD (gastroesophageal reflux disease)    • Hyperlipidemia    • Hypertension    • Kidney stone    • Stroke (HCC)        Past Surgical History:   Procedure Laterality Date   • ABDOMINAL SURGERY     • APPENDECTOMY     • COLONOSCOPY     • CYSTECTOMY      bladder removal, with pouch made from stomach   • HERNIA REPAIR     • HYSTERECTOMY     • KIDNEY STONE SURGERY     • TUBAL ABDOMINAL LIGATION         Family History: family history is not on file. Otherwise pertinent FHx was reviewed and not pertinent to current issue.    Social History:  reports that she has been smoking cigarettes. She started smoking about 57 years ago. She has been smoking an average of 1 pack per day. She has never used smokeless tobacco. She reports that she does not drink alcohol and does not use drugs.      Home Medications:  HYDROcodone-acetaminophen, amLODIPine, aspirin, magnesium oxide, metoprolol succinate XL, pantoprazole, potassium citrate, sennosides-docusate, and sulfamethoxazole-trimethoprim      Allergies:  Allergies   Allergen Reactions   • Levofloxacin Other (See Comments)     TORE ROTATOR CUFF  Other reaction(s): Rotator cuff tear arthropathy     • Propofol GI Intolerance   • Gabapentin Other (See Comments)   • Hydromorphone Hallucinations,  Mental Status Change and Other (See Comments)     Dilaudid causes the patient to hallucinate.  Dilaudid causes the patient to hallucinate.     • Propranolol Unknown - Low Severity   • Adhesive Tape Rash   • Celecoxib Unknown - High Severity and Rash       Vitals:   Temp:  [97.3 °F (36.3 °C)-97.9 °F (36.6 °C)] 97.9 °F (36.6 °C)  Heart Rate:  [65-84] 84  Resp:  [18] 18  BP: (120-161)/(56-77) 153/77    Physical Exam   She was awake and alert was not informed distress was fully developed and fairly nourished.      Neurological Examination:   Her responses were coherent and relevant.  She was aware of what was going on around her.  She has an insight to her condition.  She was able to understand and follow verbal commands.    I did not look into the fundus on either side because of the COVID-19 pandemic social distancing.    The pupils were 3 to 4 mm.  They were round and equal reactive to light directly and consensually.  There was no extraocular muscle weakness.    No facial asymmetry.  No facial weakness.  Was able to hear normal conversational speech.    She moves all 4 extremities well.    Result Review    Result Review:  I have personally reviewed the results from the time of this admission to 2/19/2023 14:47 EST and agree with these findings:  []  Laboratory  []  Microbiology  [x]  Radiology  []  EKG/Telemetry   []  Cardiology/Vascular   []  Pathology  []  Old records  []  Other:  Most notable findings include:   February 19, 2023  I reviewed the completed images of the MRI of her brain that was done on February 19, 2023.  The study showed no acute changes.  There is an area of encephalomalacia in the frontal lobes on both sides more on the left consistent with an area of old infarctions.    I reviewed the completed images of the MR angiography of the neck and cerebral vessels done on February 19, 2023.  The study showed irregularity of the terminal portion of the right vertebral artery but it is patent.   Otherwise, the MR angiography of the neck and cerebral vessels were unremarkable.    I reviewed the completed images of the MRI of the thoracic and lumbosacral spine that were done on February 19, 2023.  There were multilevel degenerative changes along the thoracic spine and multilevel disrespects complex bulging along the lumbosacral spine without evidence of nerve root or spinal cord compression.    February 18, 2023.  I have the report of the CAT scan of the abdomen and pelvis which showed:  1. Status post cystectomy with [place] pouch and right lower quadrant urostomy.  Small amount of   gas , skin thickening, and subcutaneous ill-defined fluid lateral to the urostomy in the right   lower quadrant abdominal wall.  These findings could be secondary to patient's reported recent   surgery to remove stones from the pouch, but superimposed infection and/or fistula are also   included in the differential.  No well-defined rim enhancing abscess is seen at this time.  2. Moderate right and mild left hydronephrosis without obstructing ureteral calculus seen at this   time.  There is a nonobstructing right renal calculus.  3. Small layering pleural effusions with suspected mild dependent atelectasis.        Impression:    Transient Ischemic Attack, along the basilar artery-posterior cerebral arteries.  Seizure Disorder  Thoracic Spondylosis  Lumbosacral Spondylosis  Genitofemoral neuralgia, right side  Cellulitis, right anterior abdominal wall           Plan:   We will get an EEG.                  Electronically signed by Grisel Garcia Jr., MD, 02/19/23, 2:47 PM EST.        Please note that portions of this note were completed with a voice recognition program.  Part of this note is an electric or electronic transcription/translation of spoken language to printed text using the dragon dictating system.

## 2023-02-19 NOTE — NURSING NOTE
Pt at times has intermittentt confusion. Pt thought sharps container was beeping. Pt disoriented to time. Pt pulled urostomy drainage bag off several times and was putting new ones on from her own supply. 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 Offered gloves to pt. Pt declined. Assiting pt with urostomy. Pt in bed resting at this time.

## 2023-02-20 ENCOUNTER — READMISSION MANAGEMENT (OUTPATIENT)
Dept: CALL CENTER | Facility: HOSPITAL | Age: 78
End: 2023-02-20
Payer: MEDICARE

## 2023-02-20 ENCOUNTER — TRANSCRIBE ORDERS (OUTPATIENT)
Dept: ADMINISTRATIVE | Facility: HOSPITAL | Age: 78
End: 2023-02-20
Payer: MEDICARE

## 2023-02-20 VITALS
RESPIRATION RATE: 16 BRPM | SYSTOLIC BLOOD PRESSURE: 164 MMHG | TEMPERATURE: 97.9 F | BODY MASS INDEX: 20.92 KG/M2 | DIASTOLIC BLOOD PRESSURE: 80 MMHG | OXYGEN SATURATION: 97 % | WEIGHT: 138 LBS | HEIGHT: 68 IN | HEART RATE: 82 BPM

## 2023-02-20 DIAGNOSIS — G45.9 TIA (TRANSIENT ISCHEMIC ATTACK): Primary | ICD-10-CM

## 2023-02-20 LAB
BACTERIA SPEC AEROBE CULT: NORMAL
BACTERIA SPEC AEROBE CULT: NORMAL

## 2023-02-20 PROCEDURE — A9270 NON-COVERED ITEM OR SERVICE: HCPCS | Performed by: INTERNAL MEDICINE

## 2023-02-20 PROCEDURE — 25010000002 PIPERACILLIN SOD-TAZOBACTAM PER 1 G: Performed by: INTERNAL MEDICINE

## 2023-02-20 PROCEDURE — 63710000001 ASPIRIN 81 MG TABLET DELAYED-RELEASE: Performed by: INTERNAL MEDICINE

## 2023-02-20 PROCEDURE — G0378 HOSPITAL OBSERVATION PER HR: HCPCS

## 2023-02-20 PROCEDURE — 63710000001 HYDROCODONE-ACETAMINOPHEN 7.5-325 MG TABLET: Performed by: INTERNAL MEDICINE

## 2023-02-20 PROCEDURE — 94799 UNLISTED PULMONARY SVC/PX: CPT

## 2023-02-20 PROCEDURE — 63710000001 METOPROLOL SUCCINATE XL 25 MG TABLET SUSTAINED-RELEASE 24 HOUR: Performed by: INTERNAL MEDICINE

## 2023-02-20 PROCEDURE — 97161 PT EVAL LOW COMPLEX 20 MIN: CPT

## 2023-02-20 RX ADMIN — ASPIRIN 81 MG: 81 TABLET, COATED ORAL at 08:04

## 2023-02-20 RX ADMIN — TAZOBACTAM SODIUM AND PIPERACILLIN SODIUM 3.38 G: 375; 3 INJECTION, SOLUTION INTRAVENOUS at 01:26

## 2023-02-20 RX ADMIN — TAZOBACTAM SODIUM AND PIPERACILLIN SODIUM 3.38 G: 375; 3 INJECTION, SOLUTION INTRAVENOUS at 08:04

## 2023-02-20 RX ADMIN — HYDROCODONE BITARTRATE AND ACETAMINOPHEN 2 TABLET: 7.5; 325 TABLET ORAL at 01:26

## 2023-02-20 RX ADMIN — METOPROLOL SUCCINATE 12.5 MG: 25 TABLET, EXTENDED RELEASE ORAL at 08:04

## 2023-02-20 RX ADMIN — DEXTROSE AND SODIUM CHLORIDE 100 ML/HR: 5; 450 INJECTION, SOLUTION INTRAVENOUS at 13:27

## 2023-02-20 NOTE — THERAPY EVALUATION
Acute Care - Physical Therapy Initial Evaluation   [facility]     Patient Name: Marnie Parra  : 1945  MRN: [id number]  Today's Date: 2023      Visit Dx:     ICD-10-CM ICD-9-CM   1. Difficulty walking  R26.2 719.7     Patient Active Problem List   Diagnosis   • Primary osteoarthritis of left knee   • Sepsis (HCC)   • Cellulitis     Past Medical History:   Diagnosis Date   • Arthritis    • Cancer (HCC)    • GERD (gastroesophageal reflux disease)    • Hyperlipidemia    • Hypertension    • Kidney stone    • Stroke (HCC)      Past Surgical History:   Procedure Laterality Date   • ABDOMINAL SURGERY     • APPENDECTOMY     • COLONOSCOPY     • CYSTECTOMY      bladder removal, with pouch made from stomach   • HERNIA REPAIR     • HYSTERECTOMY     • KIDNEY STONE SURGERY     • TUBAL ABDOMINAL LIGATION       PT Assessment (last 12 hours)     PT Evaluation and Treatment     Row Name 23 1200          Physical Therapy Time and Intention    Subjective Information no complaints  -CS     Document Type evaluation  -CS     Mode of Treatment individual therapy;physical therapy  -CS     Patient Effort adequate  -CS     Symptoms Noted During/After Treatment none  -CS     Row Name 23 1200          General Information    Patient Profile Reviewed yes  -CS     Patient Observations alert;cooperative;agree to therapy  -CS     Prior Level of Function independent:;all household mobility;gait;transfer;bed mobility;ADL's  Patient's spouse and son help intermittently if needed especially since recently getting sick and having a decline in general weakness and mobility.  -CS     Equipment Currently Used at Home cane, quad;shower chair;colostomy/ostomy supplies  patient with urostomy  -CS     Existing Precautions/Restrictions no known precautions/restrictions  -CS     Barriers to Rehab none identified  -CS     Row Name 23 1200          Living Environment    Current Living Arrangements home  -CS     Home Accessibility  stairs to enter home;stairs within home  -     People in Home child(shane), adult;spouse  -CS     Primary Care Provided by self;spouse/significant other;child(shane)  -CS     Row Name 02/20/23 1200          Home Main Entrance    Number of Stairs, Main Entrance two  -CS     Stair Railings, Main Entrance railings safe and in good condition  -     Row Name 02/20/23 1200          Stairs Within Home, Primary    Number of Stairs, Within Home, Primary none  -CS     Row Name 02/20/23 1200          Pain    Pretreatment Pain Rating 0/10 - no pain  -CS     Posttreatment Pain Rating 0/10 - no pain  -CS     Row Name 02/20/23 1200          Cognition    Orientation Status (Cognition) oriented x 3  -CS     Row Name 02/20/23 1200          Range of Motion Comprehensive    General Range of Motion no range of motion deficits identified  -     Row Name 02/20/23 1200          Strength Comprehensive (MMT)    General Manual Muscle Testing (MMT) Assessment no strength deficits identified  -     Row Name 02/20/23 1200          Bed Mobility    Bed Mobility bed mobility (all) activities  -     All Activities, Beckham (Bed Mobility) independent  -     Row Name 02/20/23 1200          Transfers    Transfers sit-stand transfer;stand-sit transfer  -     Row Name 02/20/23 1200          Sit-Stand Transfer    Sit-Stand Beckham (Transfers) modified independence  -     Assistive Device (Sit-Stand Transfers) cane, quad  -     Row Name 02/20/23 1200          Stand-Sit Transfer    Stand-Sit [place] (Transfers) modified independence  -     Assistive Device (Stand-Sit Transfers) cane, quad  -CS     Row Name 02/20/23 1200          Gait/Stairs (Locomotion)    Gait/Stairs Locomotion gait/ambulation assistive device  -     Beckham Level (Gait) supervision  -     Assistive Device (Gait) cane, quad  -CS     Distance in Feet (Gait) 15x2  -CS     Deviations/Abnormal Patterns (Gait) base of support, wide;gait speed decreased   -CS     Comment, (Gait/Stairs) Pt used QC in Rhand and pushed IV pole with LUE, supervision due to lines and tubes and assist with carrying bag for urostomy.  -     Row Name 02/20/23 1200          Safety Issues, Functional Mobility    Impairments Affecting Function (Mobility) endurance/activity tolerance  -     Row Name 02/20/23 1200          Balance    Balance Assessment standing dynamic balance  -     Dynamic Standing Balance supervision  -CS     Position/Device Used, Standing Balance cane, quad  -CS     Row Name             Wound Right lower flank    Wound - Properties Group Present on Hospital Admission: Y  -SJ Side: Right  -SJ Orientation: lower  -SJ Location: flank  -SJ Additional Comments: Incision site from CHINO drainage removal  -SJ    Retired Wound - Properties Group Present on Hospital Admission: Y  -SJ Side: Right  -SJ Orientation: lower  -SJ Location: flank  -SJ Additional Comments: Incision site from CHINO drainage removal  -SJ    Retired Wound - Properties Group Present on Hospital Admission: Y  -SJ Side: Right  -SJ Location: flank  -SJ Additional Comments: Incision site from CHINO drainage removal  -SJ    Row Name 02/20/23 1200          Plan of Care Review    Plan of Care Reviewed With patient  -CS     Progress no change  -CS     Outcome Evaluation Patient presents with no physical limitations that impede her ability to return home independent or with assist from her family as needed.  Patient encouraged to continue ambulating in the room and to and from the bathroom with supervision due to multiple lines and tubes.  Patient will be discharged from physical therapy caseload.  -     Row Name 02/20/23 1300          Therapy Assessment/Plan (PT)    Criteria for Skilled Interventions Met (PT) no problems identified which require skilled intervention  -CS     Therapy Frequency (PT) evaluation only  -     Row Name 02/20/23 1300          PT Evaluation Complexity    History, PT Evaluation Complexity no  personal factors and/or comorbidities  -CS     Examination of Body Systems (PT Eval Complexity) total of 4 or more elements  -CS     Clinical Presentation (PT Evaluation Complexity) stable  -CS     Clinical Decision Making (PT Evaluation Complexity) low complexity  -CS     Overall Complexity (PT Evaluation Complexity) low complexity  -CS     Row Name 02/20/23 1300          Therapy Plan Review/Discharge Plan (PT)    Therapy Plan Review (PT) evaluation/treatment results reviewed;patient  -CS           User Key  (r) = Recorded By, (t) = Taken By, (c) = Cosigned By    Initials Name Provider Type    CS Elizabeth Ronquillo, PT Physical Therapist    Elena Loredo RN Registered Nurse                Physical Therapy Education     Title: PT OT SLP Therapies (In Progress)     Topic: Physical Therapy (In Progress)     Point: Mobility training (Done)     Learning Progress Summary           Patient Acceptance, E,TB, VU by  at 2/20/2023 1318                   Point: Home exercise program (Not Started)     Learner Progress:  Not documented in this visit.          Point: Body mechanics (Not Started)     Learner Progress:  Not documented in this visit.          Point: Precautions (Done)     Learning Progress Summary           Patient Acceptance, E,TB, VU by  at 2/20/2023 1318                               User Key     Initials Effective Dates Name Provider Type Discipline     04/25/21 -  Elizabeth Ronquillo, DANIEL Physical Therapist PT              PT Recommendation and Plan  Anticipated Discharge Disposition (PT): home, home with assist, home with home health  Therapy Frequency (PT): evaluation only  Plan of Care Reviewed With: patient  Progress: no change  Outcome Evaluation: Patient presents with no physical limitations that impede her ability to return home independent or with assist from her family as needed.  Patient encouraged to continue ambulating in the room and to and from the bathroom with supervision due to multiple  lines and tubes.  Patient will be discharged from physical therapy caseload.   Outcome Measures     Row Name 02/20/23 1200             How much help from another person do you currently need...    Turning from your back to your side while in flat bed without using bedrails? 4  -CS      Moving from lying on back to sitting on the side of a flat bed without bedrails? 4  -CS      Moving to and from a bed to a chair (including a wheelchair)? 4  -CS      Standing up from a chair using your arms (e.g., wheelchair, bedside chair)? 4  -CS      Climbing 3-5 steps with a railing? 3  -CS      To walk in hospital room? 3  -CS      AM-PAC 6 Clicks Score (PT) 22  -CS         Functional Assessment    Outcome Measure Options AM-PAC 6 Clicks Basic Mobility (PT)  -CS            User Key  (r) = Recorded By, (t) = Taken By, (c) = Cosigned By    Initials Name Provider Type    Elizabeth Burciaga PT Physical Therapist                 Time Calculation:    PT Charges     Row Name 02/20/23 1214             Time Calculation    PT Received On 02/20/23  -CS         Untimed Charges    PT Eval/Re-eval Minutes 46  -CS         Total Minutes    Untimed Charges Total Minutes 46  -CS       Total Minutes 46  -CS            User Key  (r) = Recorded By, (t) = Taken By, (c) = Cosigned By    Initials Name Provider Type    Elizabeth Burciaga PT Physical Therapist              Therapy Charges for Today     Code Description Service Date Service Provider Modifiers Qty    99277438973 HC PT EVAL LOW COMPLEXITY 4 2/20/2023 Elizabeth Ronquillo PT GP 1          PT G-Codes  Outcome Measure Options: AM-PAC 6 Clicks Basic Mobility (PT)  AM-PAC 6 Clicks Score (PT): 22    Elizabeth Ronquillo PT  2/20/2023

## 2023-02-20 NOTE — SIGNIFICANT NOTE
" Wound Eval / Progress Noted    [facility]     Patient Name: Marnie Parra  : 1945  MRN: [id number]  Today's Date: 2023                 Admit Date: 2/15/2023    Visit Dx:    ICD-10-CM ICD-9-CM   1. Difficulty walking  R26.2 719.7       Patient Active Problem List   Diagnosis    Primary osteoarthritis of left knee    Sepsis (HCC)    Cellulitis        Past Medical History:   Diagnosis Date    Arthritis     Cancer (HCC)     GERD (gastroesophageal reflux disease)     Hyperlipidemia     Hypertension     Kidney stone     Stroke (HCC)         Past Surgical History:   Procedure Laterality Date    ABDOMINAL SURGERY      APPENDECTOMY      COLONOSCOPY      CYSTECTOMY      bladder removal, with pouch made from stomach    HERNIA REPAIR      HYSTERECTOMY      KIDNEY STONE SURGERY      TUBAL ABDOMINAL LIGATION        23 1027   Wound Right lower flank   No placement date or time found.   Present on Hospital Admission: Yes  Side: Right  Orientation: lower  Location: flank  Additional Comments: Incision site from CHINO drainage removal   Dressing Appearance dry;intact   Base clean   Drainage Characteristics/Odor clear;yellow   Drainage Amount moderate   Dressing Care other (see comments)  (urostomy pouch in place)   Urostomy [place] Pouch RLQ   No Placement date: If unknown, DO NOT use \"Add Comment\" note or Placement time: If unknown, DO NOT use \"Add Comment\" note found.   Present on Admission? Select all that apply: Unknown placement date/time  Hand Hygiene Completed: Yes  Urostomy Type: In...   Stomal Appliance 2 piece;Clean;Dry;Intact   Stoma Appearance red;moist;pink   Peristomal Assessment Clean;Intact     Wound Check / Follow-up:  Patient seen today for consult. Patient with existing urostomy to right lower quadrant. Patient reports this was placed at [facility]. Patient with previous CHINO drain site adjacent to urostomy. Patient reports she was seen again by her surgeon for a procedure to " this area that then resulted in urine draining from this site. Site is pouched with urostomy pouch at this time. Urine noted to this pouch and urostomy pouch. Pouching systems are both clean, dry, and intact with no signs of lifting or leaking. Patient states she manages her urostomy independently and reports no issues or concerns with managing system.      Impression: Existing urostomy, previous drain site with delayed closure.      Short term goals:  Urostomy management, skin protection.      Mercy Bhat RN    2/20/2023    12:24 EST

## 2023-02-20 NOTE — DISCHARGE SUMMARY
[facility]         DISCHARGE SUMMARY    Patient Name: Marnie Parra  : 1945  MRN: [id number]    Date of Admission: 2/15/2023  Date of Discharge:  2023  Primary Care Physician: Nicolas Velasquez MD    Consults     Date and Time Order Name Status Description    2023 10:36 AM Inpatient Neurology Consult General            Presenting Problem:   Sepsis (HCC) [A41.9]  Cellulitis [L03.90]    Active and Resolved Hospital Problems:  Active Hospital Problems    Diagnosis POA   • **Sepsis (HCC) [A41.9] Yes   • Cellulitis [L03.90] Yes      Resolved Hospital Problems   No resolved problems to display.         Hospital Course     Hospital Course:  Marnie Parra is a 77 y.o. female patient was admitted with a severe cellulitis of the abdominal wall after she had his surgery in [place] that she was having fever at home she was crying in the office with pain and skin was red-hot inflamed but drainage was started on IV antibiotics and started feeling better there was copiously urine drainage from the site to which was previously drainage tube she was given pain management as well as IV antibiotics he subsequently started complaining of double vision and visual disturbances and a consultation was made with  there were no abnormalities significant on MRI however  has also recommended to get her EEG which will be scheduled as an outpatientshe is feeling stable doing well as per recommendation form [facility] Doctor Flori a CT scan of the abdomen was done Dr. Ruby will be following the results of that at this time she is stable doing fine and will be discharged home today and will be seen in 4 weeks and a follow-up visit       DISCHARGE Follow Up Recommendations for labs and diagnostics: EEG has been scheduled patient will be seeing Dr. Ruby as an outpatient      Pertinent  and/or Most Recent Results     LAB RESULTS:      Lab 23  0402 23  044  02/16/23  0459 02/15/23  1738   WBC 5.70 7.94 10.77 15.52*   HEMOGLOBIN 13.1 11.7* 11.6* 12.6   HEMATOCRIT 39.8 36.0 34.4 38.4   PLATELETS 276 226 206 244   NEUTROS ABS 3.93 6.53 9.40*  --    IMMATURE GRANS (ABS) 0.03 0.03 0.07*  --    LYMPHS ABS 0.95 0.87 0.94  --    MONOS ABS 0.46 0.27 0.25  --    EOS ABS 0.29 0.22 0.08  --    MCV 88.6 90.2 88.9 89.7   PROTIME  --   --  14.5  --          Lab 02/19/23  0402 02/17/23  0447 02/16/23  0459 02/15/23  1738   SODIUM 141 138 134* 136   POTASSIUM 3.9 3.9 4.0 3.9   CHLORIDE 107 106 103 101   CO2 26.1 23.3 22.3 24.1   ANION GAP 7.9 8.7 8.7 10.9   BUN 7* 17 31* 33*   CREATININE 0.60 0.85 1.18* 1.70*   EGFR 93.2 71.1 48.0* 31.0*   GLUCOSE 120* 98 108* 112*   CALCIUM 9.0 8.3* 8.2* 9.0         Lab 02/16/23  0459 02/15/23  1738   TOTAL PROTEIN 5.5* 6.4   ALBUMIN 2.9* 3.6   GLOBULIN 2.6 2.8   ALT (SGPT) 11 15   AST (SGOT) 12 12   BILIRUBIN 0.4 0.5   ALK PHOS 75 90         Lab 02/16/23 0459   PROTIME 14.5   INR 1.12                 Brief Urine Lab Results  (Last result in the past 365 days)      Color   Clarity   Blood   Leuk Est   Nitrite   Protein   CREAT   Urine HCG        02/16/23 1125 Yellow   Clear   Large (3+)   Moderate (2+)   Negative   Trace               Microbiology Results (last 10 days)     Procedure Component Value - Date/Time    Urine Culture - Urine, Urine, Clean Catch [[id number]]  (Normal) Collected: 02/16/23 1125    Lab Status: Final result Specimen: Urine, Clean Catch Updated: 02/17/23 1517     Urine Culture No growth    Blood Culture - Blood, Arm, Left [[id number]]  (Normal) Collected: 02/15/23 1738    Lab Status: Preliminary result Specimen: Blood from Arm, Left Updated: 02/19/23 1800     Blood Culture No growth at 4 days    Blood Culture - Blood, Arm, Right [[id number]]  (Normal) Collected: 02/15/23 1738    Lab Status: Preliminary result Specimen: Blood from Arm, Right Updated: 02/19/23 1800     Blood Culture No growth at 4 days          MRI Angiogram Head  Without Contrast    Result Date: 2/19/2023  Impression:  Negative MR angiogram of the brain.     CLAUDIO MARINELLI MD       Electronically Signed and Approved By: CLAUDIO MARINELLI MD on 2/19/2023 at 15:46             MRI Angiogram Neck Without Contrast    Result Date: 2/19/2023  Impression:   MR angiogram of the neck demonstrating no high-grade stenosis.     CLAUDIO MARINELLI MD       Electronically Signed and Approved By: CLAUDIO MARINELLI MD on 2/19/2023 at 17:50             MRI Brain With & Without Contrast    Result Date: 2/19/2023  Impression:   MR examination of the brain without and with IV contrast demonstrating mild small vessel disease and/or scattered lacunar infarcts.  2 cm area of encephalomalacia in the left frontal lobe consistent with old infarct.  No acute intracranial abnormality is seen.      CLAUDIO MARINELLI MD       Electronically Signed and Approved By: CLAUDIO MARINELLI MD on 2/19/2023 at 11:36             MRI Thoracic Spine Without Contrast    Result Date: 2/19/2023  Impression:   MR examination of the thoracic spine demonstrating no intra or extra canalicular abnormality.      CLAUDIO MARINELLI MD       Electronically Signed and Approved By: CLAUDIO MARINELLI MD on 2/19/2023 at 18:32             MRI Lumbar Spine Without Contrast    Result Date: 2/19/2023  Impression:   MR examination of the lumbar spine demonstrating multi level degenerative change.  No high-grade central canal or neural foraminal stenosis is evident.  Moderate right and mild left hydronephrosis.      CLAUDIO MARINELLI MD       Electronically Signed and Approved By: CLAUDIO MARINELLI MD on 2/19/2023 at 19:13             CT Abdomen Pelvis With Contrast    Result Date: 2/17/2023  Impression:   1. Status post cystectomy with [place] pouch and right lower quadrant urostomy.  Small amount of gas , skin thickening, and subcutaneous ill-defined fluid lateral to the urostomy in the right lower quadrant abdominal wall.  These findings could be secondary to  patient's reported recent surgery to remove stones from the pouch, but superimposed infection and/or fistula are also included in the differential.  No well-defined rim enhancing abscess is seen at this time. 2. Moderate right and mild left hydronephrosis without obstructing ureteral calculus seen at this time.  There is a nonobstructing right renal calculus. 3. Small layering pleural effusions with suspected mild dependent atelectasis.     RAJESH PAGAN MD       Electronically Signed and Approved By: RAJESH PAGAN MD on 2/17/2023 at 20:20             XR Chest 1 View    Result Date: 2/17/2023  Impression:   1. No acute process       SEE CANTU MD       Electronically Signed and Approved By: SEE CANTU MD on 2/17/2023 at 10:19                           Labs Pending at Discharge:  Pending Labs     Order Current Status    Blood Culture - Blood, Arm, Left Preliminary result    Blood Culture - Blood, Arm, Right Preliminary result            Discharge Details        Discharge Medications      ASK your doctor about these medications      Instructions Start Date   amLODIPine 2.5 MG tablet  Commonly known as: NORVASC   No dose, route, or frequency recorded.      aspirin 81 MG EC tablet   81 mg, Oral, Daily      HYDROcodone-acetaminophen 5-325 MG per tablet  Commonly known as: NORCO   1 tablet, Oral, Every 6 Hours PRN      MAGnesium-Oxide 400 (241.3 Mg) MG tablet tablet  Generic drug: magnesium oxide   No dose, route, or frequency recorded.      metoprolol succinate XL 25 MG 24 hr tablet  Commonly known as: TOPROL-XL   12.5 mg, Oral, Daily      pantoprazole 40 MG EC tablet  Commonly known as: PROTONIX   No dose, route, or frequency recorded.      potassium citrate 10 MEQ (1080 MG) CR tablet  Commonly known as: UROCIT-K   No dose, route, or frequency recorded.      Senna-Plus 8.6-50 MG per tablet  Generic drug: sennosides-docusate   2 tablets, Oral, Nightly PRN      sulfamethoxazole-trimethoprim 800-160 MG per  tablet  Commonly known as: BACTRIM DS,SEPTRA DS   1 tablet, Oral, 2 Times Daily             Allergies   Allergen Reactions   • Levofloxacin Other (See Comments)     TORE ROTATOR CUFF  Other reaction(s): Rotator cuff tear arthropathy     • Propofol GI Intolerance   • Gabapentin Other (See Comments)   • Hydromorphone Hallucinations, Mental Status Change and Other (See Comments)     Dilaudid causes the patient to hallucinate.  Dilaudid causes the patient to hallucinate.     • Propranolol Unknown - Low Severity   • Adhesive Tape Rash   • Celecoxib Unknown - High Severity and Rash         Discharge Disposition:      Diet:  Hospital:  Diet Order   Procedures   • Diet: Regular/House Diet; Texture: Regular Texture (IDDSI 7); Fluid Consistency: Thin (IDDSI 0)         Discharge Activity: as tolerate         CODE STATUS:  Code Status and Medical Interventions:   Ordered at: 02/15/23 1722     Level Of Support Discussed With:    Patient     Code Status (Patient has no pulse and is not breathing):    CPR (Attempt to Resuscitate)     Medical Interventions (Patient has pulse or is breathing):    Full Support         Future Appointments   Date Time Provider Department Center   3/2/2023  1:00 PM [facility] VILMA 2 [facility] MAMMO [facility]   3/24/2023  8:00 AM [facility] OP EEG MACHINE 1 [facility] NEURO [facility]           Time spent on Discharge including face to face service:  45 minutes    Electronically signed by Nicolas Velasquez MD, 02/20/23, 4:56 PM EST.    Part of this note may be an electronic transcription/translation of spoken language to printed text using the Dragon Dictation System.

## 2023-02-20 NOTE — PLAN OF CARE
Goal Outcome Evaluation:  Plan of Care Reviewed With: patient        Progress: no change  Outcome Evaluation: pt aox4, Vs stable, medicated for pain per MAR once. urostomy bag  on small opening to right of stoma started leaking, pt changed bag with RN assistance. most urine coming from opening to right of stoma, however stoma has started producing small amount of output.

## 2023-02-20 NOTE — PLAN OF CARE
Goal Outcome Evaluation:  Plan of Care Reviewed With: patient        Progress: no change  Outcome Evaluation: Patient presents with no physical limitations that impede her ability to return home independent or with assist from her family as needed.  Patient encouraged to continue ambulating in the room and to and from the bathroom with supervision due to multiple lines and tubes.  Patient will be discharged from physical therapy caseload.

## 2023-02-20 NOTE — PROGRESS NOTES
[facility]     Progress Note    Patient Name: Marnie Parra  : 1945  MRN: [id number]  Primary Care Physician:  Nicolas Velasquez MD  Date of admission: 2/15/2023    Subjective   Subjective     Chief Complaint: So far the urostomy bag now functioning the drainage from the site is now decreased she still complains of visual disturbances and EEG has been ordered by  we will discharge her home when it is okay with     HPI: Patient with visual disturbances otherwise stable    Review of Systems   All systems were reviewed and negative except for: Reviewed    Objective   Objective     Vitals:   Temp:  [97.9 °F (36.6 °C)-98.5 °F (36.9 °C)] 97.9 °F (36.6 °C)  Heart Rate:  [64-88] 72  Resp:  [18] 18  BP: (130-153)/(57-87) 143/69    Physical Exam    Constitutional: Awake, alert   Eyes: PERRLA, sclerae anicteric, no conjunctival injection   HENT: NCAT, mucous membranes moist   Neck: Supple, no thyromegaly, no lymphadenopathy, trachea midline   Respiratory: Clear to auscultation bilaterally, nonlabored respirations    Cardiovascular: RRR, no murmurs, rubs, or gallops, palpable pedal pulses bilaterally   Gastrointestinal: Positive bowel sounds, soft, nontender, nondistended   Musculoskeletal: No bilateral ankle edema, no clubbing or cyanosis to extremities   Psychiatric: Appropriate affect, cooperative   Neurologic: Oriented x 3, strength symmetric in all extremities, Cranial Nerves grossly intact to confrontation, speech clear   Skin: No rashes   No change abdominal wall cellulitis  Result Review    Result Review:  I have personally reviewed the results from the time of this admission to 2023 08:08 EST and agree with these findings:  [x]  Laboratory no change  []  Microbiology  []  Radiology  []  EKG/Telemetry   []  Cardiology/Vascular   []  Pathology  []  Old records  []  Other:  Most notable findings include: No change    Assessment & Plan   Assessment / Plan     Brief Patient  Summary:  Marnie Parra is a 77 y.o. female who patient with history of bladder cancer    Active Hospital Problems:  Active Hospital Problems    Diagnosis    • **Sepsis (HCC)    • Cellulitis        Plan:   Continue current management EEG to be done today we will discharge when okay with neurology    DVT prophylaxis:  Mechanical DVT prophylaxis orders are present.    CODE STATUS:   Level Of Support Discussed With: Patient  Code Status (Patient has no pulse and is not breathing): CPR (Attempt to Resuscitate)  Medical Interventions (Patient has pulse or is breathing): Full Support    Disposition:  I expect patient to be discharged after cleared by neurology possible tomorrow.    Electronically signed by Nicolas Velasquez MD, 02/20/23, 8:08 AM EST.      Part of this note may be an electronic transcription/translation of spoken language to printed text using the Dragon Dictation System.

## 2023-02-21 NOTE — OUTREACH NOTE
Prep Survey    Flowsheet Row Responses   [facility] facility patient discharged from? [facility]   Is LACE score < 7 ? Yes   Eligibility Not Eligible   What are the reasons patient is not eligible? Other  [Low risk for readmission]   Does the patient have one of the following disease processes/diagnoses(primary or secondary)? Other   Prep survey completed? Yes          DANIEL RUIZ - Registered Nurse

## 2023-03-02 ENCOUNTER — HOSPITAL ENCOUNTER (OUTPATIENT)
Dept: MAMMOGRAPHY | Facility: HOSPITAL | Age: 78
Discharge: HOME | End: 2023-03-02
Admitting: INTERNAL MEDICINE
Payer: MEDICARE

## 2023-03-02 DIAGNOSIS — Z12.31 BREAST CANCER SCREENING BY MAMMOGRAM: ICD-10-CM

## 2023-03-02 PROCEDURE — 77067 SCR MAMMO BI INCL CAD: CPT

## 2023-03-02 PROCEDURE — 77063 BREAST TOMOSYNTHESIS BI: CPT

## 2023-03-24 ENCOUNTER — HOSPITAL ENCOUNTER (OUTPATIENT)
Dept: NEUROLOGY | Facility: HOSPITAL | Age: 78
Discharge: HOME | End: 2023-03-24
Admitting: PSYCHIATRY & NEUROLOGY
Payer: MEDICARE

## 2023-03-24 DIAGNOSIS — G45.9 TIA (TRANSIENT ISCHEMIC ATTACK): ICD-10-CM

## 2023-03-24 PROCEDURE — 95816 EEG AWAKE AND DROWSY: CPT

## 2023-06-20 NOTE — TELEPHONE ENCOUNTER
May call patient to schedule new patient establish care visit if he wishes   Pt looking to get gel inj please obtain auth

## 2023-08-21 ENCOUNTER — TELEPHONE (OUTPATIENT)
Dept: ORTHOPEDIC SURGERY | Facility: CLINIC | Age: 78
End: 2023-08-21

## 2023-08-21 NOTE — TELEPHONE ENCOUNTER
Caller: ROSEMARY   Relationship to Patient: SELF  Phone Number: [phone]  Reason for Call: LEFT KNEE SYNVISC INJECTION ORDERS

## 2023-09-05 ENCOUNTER — TELEPHONE (OUTPATIENT)
Dept: ORTHOPEDIC SURGERY | Facility: CLINIC | Age: 78
End: 2023-09-05
Payer: MEDICARE

## 2023-09-05 NOTE — TELEPHONE ENCOUNTER
APPT: 9-6 AT 10:45 AM LT KNEE SYNVISC ONE INJ WITH ESTEFANIA    LAST LT KNEE SYNVISC ONE INJ: 11-30-22    [facility]

## 2023-09-05 NOTE — TELEPHONE ENCOUNTER
----- Message from [facility] sent at 8/24/2023 10:01 AM EDT -----  No PA Required for Left Knee Synvisc One.  Okay to schedule when appropriate.

## 2023-09-06 ENCOUNTER — OFFICE VISIT (OUTPATIENT)
Dept: ORTHOPEDIC SURGERY | Facility: CLINIC | Age: 78
End: 2023-09-06
Payer: MEDICARE

## 2023-09-06 VITALS
SYSTOLIC BLOOD PRESSURE: 126 MMHG | BODY MASS INDEX: 20.92 KG/M2 | HEART RATE: 77 BPM | OXYGEN SATURATION: 94 % | HEIGHT: 68 IN | WEIGHT: 138.01 LBS | DIASTOLIC BLOOD PRESSURE: 76 MMHG

## 2023-09-06 DIAGNOSIS — M17.12 PRIMARY OSTEOARTHRITIS OF LEFT KNEE: Primary | ICD-10-CM

## 2023-09-06 NOTE — PROGRESS NOTES
"Chief Complaint  Pain and Follow-up of the Left Knee    Subjective      Marnie Parra presents to [facility] for follow-up of left knee pain and osteoarthritis.  Patient last had a Synvisc injection on 11/30/2022 and reports that she feels she may have put it off too long this time.  Patient is continuing to have left knee pain.  She is here today to receive a Synvisc injection.    Objective   Allergies   Allergen Reactions    Levofloxacin Other (See Comments)     TORE ROTATOR CUFF  Other reaction(s): Rotator cuff tear arthropathy      Propofol GI Intolerance    Gabapentin Other (See Comments) and Myalgia    Hydromorphone Hallucinations, Mental Status Change and Other (See Comments)     Dilaudid causes the patient to hallucinate.    Propranolol Unknown - Low Severity    Adhesive Tape Rash    Celecoxib Unknown - High Severity and Rash       Vital Signs:   /76   Pulse 77   Ht 172.7 cm (68\")   Wt 62.6 kg (138 lb 0.1 oz)   SpO2 94%   BMI 20.98 kg/m²       Physical Exam    Constitutional: Awake, alert. Well nourished appearance.    Integumentary: Warm, dry, intact. No obvious rashes.    HENT: Atraumatic, normocephalic.   Respiratory: Non labored respirations .   Cardiovascular: Intact peripheral pulses.    Psychiatric: Normal mood and affect. A&O X3    Ortho Exam  Left knee: Tender to palpation to the medial and lateral joint line.  Range of motion 0 to 100 degrees.  Stable to varus and valgus stress.  Sensation is consistent with baseline neuropathy.  She is having hypersensitivity to light touch on the lateral aspect of the thigh and knee.    Imaging Results (Most Recent)       None             Large Joint Arthrocentesis: L knee  Date/Time: 9/6/2023 10:59 AM  Consent given by: patient  Site marked: site marked  Timeout: Immediately prior to procedure a time out was called to verify the correct patient, procedure, equipment, support staff and site/side marked as required "   Supporting Documentation  Indications: pain   Procedure Details  Location: knee - L knee  Preparation: Patient was prepped and draped in the usual sterile fashion  Needle gauge: 21 G.  Approach: lateral  Medications administered: 48 mg hylan 48 MG/6ML  Patient tolerance: patient tolerated the procedure well with no immediate complications            Assessment and Plan   Problem List Items Addressed This Visit          Musculoskeletal and Injuries    Primary osteoarthritis of left knee - Primary    Relevant Orders    Large Joint Arthrocentesis: L knee       Follow Up   Return if symptoms worsen or fail to improve.    Tobacco Use: High Risk    Smoking Tobacco Use: Every Day    Smokeless Tobacco Use: Never    Passive Exposure: Not on file       Educated on risk of smoking. Discussed options for smoking cessation.    Patient Instructions   Patient received Synvisc injection today in office into the left knee and tolerated the procedure well without complication.  Counseled that these injections can be given every 6 months and 1 day with insurance approval. Pt can also receive steroid injections intermittently between these doses.  Steroid injections can be given every 3 months.    Follow up as needed for worsening symptoms. Call with questions, concerns, or worsening symptoms.   Patient was given instructions and counseling regarding her condition or for health maintenance advice. Please see specific information pulled into the AVS if appropriate.

## 2023-09-06 NOTE — PATIENT INSTRUCTIONS
Patient received Synvisc injection today in office into the left knee and tolerated the procedure well without complication.  Counseled that these injections can be given every 6 months and 1 day with insurance approval. Pt can also receive steroid injections intermittently between these doses.  Steroid injections can be given every 3 months.    Follow up as needed for worsening symptoms. Call with questions, concerns, or worsening symptoms.

## 2023-10-02 ENCOUNTER — APPOINTMENT (OUTPATIENT)
Dept: CT IMAGING | Facility: HOSPITAL | Age: 78
DRG: 871 | End: 2023-10-02
Payer: MEDICARE

## 2023-10-02 ENCOUNTER — APPOINTMENT (OUTPATIENT)
Dept: GENERAL RADIOLOGY | Facility: HOSPITAL | Age: 78
DRG: 871 | End: 2023-10-02
Payer: MEDICARE

## 2023-10-02 ENCOUNTER — TRANSCRIBE ORDERS (OUTPATIENT)
Dept: ADMINISTRATIVE | Facility: HOSPITAL | Age: 78
End: 2023-10-02
Payer: MEDICARE

## 2023-10-02 ENCOUNTER — HOSPITAL ENCOUNTER (OUTPATIENT)
Dept: CT IMAGING | Facility: HOSPITAL | Age: 78
Discharge: HOME | End: 2023-10-02
Admitting: INTERNAL MEDICINE
Payer: MEDICARE

## 2023-10-02 ENCOUNTER — HOSPITAL ENCOUNTER (INPATIENT)
Facility: HOSPITAL | Age: 78
LOS: 4 days | Discharge: HOME | DRG: 871 | End: 2023-10-06
Attending: EMERGENCY MEDICINE | Admitting: INTERNAL MEDICINE
Payer: MEDICARE

## 2023-10-02 ENCOUNTER — TRANSCRIBE ORDERS (OUTPATIENT)
Dept: CT IMAGING | Facility: HOSPITAL | Age: 78
End: 2023-10-02
Payer: MEDICARE

## 2023-10-02 DIAGNOSIS — R26.2 DIFFICULTY IN WALKING: ICD-10-CM

## 2023-10-02 DIAGNOSIS — R10.9 ACUTE ABDOMINAL PAIN: ICD-10-CM

## 2023-10-02 DIAGNOSIS — R10.9 ACUTE ABDOMINAL PAIN: Primary | ICD-10-CM

## 2023-10-02 DIAGNOSIS — N13.9 OBSTRUCTIVE UROPATHY: Primary | ICD-10-CM

## 2023-10-02 PROBLEM — N20.1 URETEROLITHIASIS: Status: ACTIVE | Noted: 2023-10-02

## 2023-10-02 PROBLEM — N20.0 KIDNEY STONES, CALCIUM OXALATE DIHYDRATE: Status: ACTIVE | Noted: 2023-10-02

## 2023-10-02 PROBLEM — I95.9 HYPOTENSION: Status: ACTIVE | Noted: 2023-10-02

## 2023-10-02 LAB
ALBUMIN SERPL-MCNC: 2.5 G/DL (ref 3.5–5.2)
ALBUMIN SERPL-MCNC: 3.9 G/DL (ref 3.5–5.2)
ALBUMIN/GLOB SERPL: 1.1 G/DL
ALBUMIN/GLOB SERPL: 1.3 G/DL
ALP SERPL-CCNC: 130 U/L (ref 39–117)
ALP SERPL-CCNC: 95 U/L (ref 39–117)
ALT SERPL W P-5'-P-CCNC: 20 U/L (ref 1–33)
ALT SERPL W P-5'-P-CCNC: 434 U/L (ref 1–33)
AMORPH URATE CRY URNS QL MICRO: ABNORMAL /HPF
ANION GAP SERPL CALCULATED.3IONS-SCNC: 10 MMOL/L (ref 5–15)
ANION GAP SERPL CALCULATED.3IONS-SCNC: 7.4 MMOL/L (ref 5–15)
ANISOCYTOSIS BLD QL: ABNORMAL
APTT PPP: 26.9 SECONDS (ref 24.2–34.2)
AST SERPL-CCNC: 17 U/L (ref 1–32)
AST SERPL-CCNC: 888 U/L (ref 1–32)
BACTERIA UR QL AUTO: ABNORMAL /HPF
BACTERIA UR QL AUTO: ABNORMAL /HPF
BASOPHILS # BLD AUTO: 0.05 10*3/MM3 (ref 0–0.2)
BASOPHILS NFR BLD AUTO: 0.4 % (ref 0–1.5)
BILIRUB SERPL-MCNC: 0.3 MG/DL (ref 0–1.2)
BILIRUB SERPL-MCNC: 1.1 MG/DL (ref 0–1.2)
BILIRUB UR QL STRIP: NEGATIVE
BILIRUB UR QL STRIP: NEGATIVE
BUN SERPL-MCNC: 16 MG/DL (ref 8–23)
BUN SERPL-MCNC: 18 MG/DL (ref 8–23)
BUN/CREAT SERPL: 23.7 (ref 7–25)
BUN/CREAT SERPL: 23.9 (ref 7–25)
CALCIUM SPEC-SCNC: 7.7 MG/DL (ref 8.6–10.5)
CALCIUM SPEC-SCNC: 9.2 MG/DL (ref 8.6–10.5)
CHLORIDE SERPL-SCNC: 104 MMOL/L (ref 98–107)
CHLORIDE SERPL-SCNC: 112 MMOL/L (ref 98–107)
CLARITY UR: ABNORMAL
CLARITY UR: ABNORMAL
CO2 SERPL-SCNC: 18 MMOL/L (ref 22–29)
CO2 SERPL-SCNC: 23.6 MMOL/L (ref 22–29)
COLOR UR: ABNORMAL
COLOR UR: YELLOW
CREAT SERPL-MCNC: 0.67 MG/DL (ref 0.57–1)
CREAT SERPL-MCNC: 0.76 MG/DL (ref 0.57–1)
D-LACTATE SERPL-SCNC: 0.9 MMOL/L (ref 0.5–2)
DEPRECATED RDW RBC AUTO: 44.5 FL (ref 37–54)
DEPRECATED RDW RBC AUTO: 45.8 FL (ref 37–54)
EGFRCR SERPLBLD CKD-EPI 2021: 80.8 ML/MIN/1.73
EGFRCR SERPLBLD CKD-EPI 2021: 90.2 ML/MIN/1.73
EOSINOPHIL # BLD AUTO: 0.07 10*3/MM3 (ref 0–0.4)
EOSINOPHIL NFR BLD AUTO: 0.5 % (ref 0.3–6.2)
ERYTHROCYTE [DISTWIDTH] IN BLOOD BY AUTOMATED COUNT: 13.2 % (ref 12.3–15.4)
ERYTHROCYTE [DISTWIDTH] IN BLOOD BY AUTOMATED COUNT: 13.2 % (ref 12.3–15.4)
GIANT PLATELETS: ABNORMAL
GLOBULIN UR ELPH-MCNC: 2.3 GM/DL
GLOBULIN UR ELPH-MCNC: 3 GM/DL
GLUCOSE SERPL-MCNC: 115 MG/DL (ref 65–99)
GLUCOSE SERPL-MCNC: 127 MG/DL (ref 65–99)
GLUCOSE UR STRIP-MCNC: NEGATIVE MG/DL
GLUCOSE UR STRIP-MCNC: NEGATIVE MG/DL
HCT VFR BLD AUTO: 36.5 % (ref 34–46.6)
HCT VFR BLD AUTO: 43.3 % (ref 34–46.6)
HGB BLD-MCNC: 11.6 G/DL (ref 12–15.9)
HGB BLD-MCNC: 14.1 G/DL (ref 12–15.9)
HGB UR QL STRIP.AUTO: ABNORMAL
HGB UR QL STRIP.AUTO: ABNORMAL
HOLD SPECIMEN: NORMAL
HOLD SPECIMEN: NORMAL
HYALINE CASTS UR QL AUTO: ABNORMAL /LPF
HYALINE CASTS UR QL AUTO: ABNORMAL /LPF
HYPOCHROMIA BLD QL: ABNORMAL
IMM GRANULOCYTES # BLD AUTO: 0.06 10*3/MM3 (ref 0–0.05)
IMM GRANULOCYTES NFR BLD AUTO: 0.4 % (ref 0–0.5)
INR PPP: 0.88 (ref 0.86–1.15)
KETONES UR QL STRIP: NEGATIVE
KETONES UR QL STRIP: NEGATIVE
LARGE PLATELETS: ABNORMAL
LEUKOCYTE ESTERASE UR QL STRIP.AUTO: ABNORMAL
LEUKOCYTE ESTERASE UR QL STRIP.AUTO: ABNORMAL
LIPASE SERPL-CCNC: 33 U/L (ref 13–60)
LYMPHOCYTES # BLD AUTO: 0.93 10*3/MM3 (ref 0.7–3.1)
LYMPHOCYTES # BLD MANUAL: 0.21 10*3/MM3 (ref 0.7–3.1)
LYMPHOCYTES NFR BLD AUTO: 6.7 % (ref 19.6–45.3)
LYMPHOCYTES NFR BLD MANUAL: 2 % (ref 5–12)
MACROCYTES BLD QL SMEAR: ABNORMAL
MCH RBC QN AUTO: 29.7 PG (ref 26.6–33)
MCH RBC QN AUTO: 30.1 PG (ref 26.6–33)
MCHC RBC AUTO-ENTMCNC: 31.8 G/DL (ref 31.5–35.7)
MCHC RBC AUTO-ENTMCNC: 32.6 G/DL (ref 31.5–35.7)
MCV RBC AUTO: 91.4 FL (ref 79–97)
MCV RBC AUTO: 94.6 FL (ref 79–97)
MICROCYTES BLD QL: ABNORMAL
MONOCYTES # BLD AUTO: 0.91 10*3/MM3 (ref 0.1–0.9)
MONOCYTES # BLD: 0.1 10*3/MM3 (ref 0.1–0.9)
MONOCYTES NFR BLD AUTO: 6.6 % (ref 5–12)
MUCOUS THREADS URNS QL MICRO: ABNORMAL /HPF
NEUTROPHILS # BLD AUTO: 4.91 10*3/MM3 (ref 1.7–7)
NEUTROPHILS NFR BLD AUTO: 11.76 10*3/MM3 (ref 1.7–7)
NEUTROPHILS NFR BLD AUTO: 85.4 % (ref 42.7–76)
NEUTROPHILS NFR BLD MANUAL: 88 % (ref 42.7–76)
NEUTS BAND NFR BLD MANUAL: 6 % (ref 0–5)
NEUTS VAC BLD QL SMEAR: ABNORMAL
NITRITE UR QL STRIP: NEGATIVE
NITRITE UR QL STRIP: POSITIVE
NRBC BLD AUTO-RTO: 0 /100 WBC (ref 0–0.2)
OVALOCYTES BLD QL SMEAR: ABNORMAL
PH UR STRIP.AUTO: 6.5 [PH] (ref 5–8)
PH UR STRIP.AUTO: >=9 [PH] (ref 5–8)
PLATELET # BLD AUTO: 197 10*3/MM3 (ref 140–450)
PLATELET # BLD AUTO: 328 10*3/MM3 (ref 140–450)
PMV BLD AUTO: 10.3 FL (ref 6–12)
PMV BLD AUTO: 9.6 FL (ref 6–12)
POTASSIUM SERPL-SCNC: 3.4 MMOL/L (ref 3.5–5.2)
POTASSIUM SERPL-SCNC: 4.1 MMOL/L (ref 3.5–5.2)
PROT SERPL-MCNC: 4.8 G/DL (ref 6–8.5)
PROT SERPL-MCNC: 6.9 G/DL (ref 6–8.5)
PROT UR QL STRIP: ABNORMAL
PROT UR QL STRIP: ABNORMAL
PROTHROMBIN TIME: 12.1 SECONDS (ref 11.8–14.9)
RBC # BLD AUTO: 3.86 10*6/MM3 (ref 3.77–5.28)
RBC # BLD AUTO: 4.74 10*6/MM3 (ref 3.77–5.28)
RBC # UR STRIP: ABNORMAL /HPF
RBC # UR STRIP: ABNORMAL /HPF
REF LAB TEST METHOD: ABNORMAL
REF LAB TEST METHOD: ABNORMAL
SMALL PLATELETS BLD QL SMEAR: ABNORMAL
SODIUM SERPL-SCNC: 135 MMOL/L (ref 136–145)
SODIUM SERPL-SCNC: 140 MMOL/L (ref 136–145)
SP GR UR STRIP: 1.01 (ref 1–1.03)
SP GR UR STRIP: 1.01 (ref 1–1.03)
SQUAMOUS #/AREA URNS HPF: ABNORMAL /HPF
SQUAMOUS #/AREA URNS HPF: ABNORMAL /HPF
TRI-PHOS CRY URNS QL MICRO: ABNORMAL /HPF
UROBILINOGEN UR QL STRIP: ABNORMAL
UROBILINOGEN UR QL STRIP: ABNORMAL
VARIANT LYMPHS NFR BLD MANUAL: 4 % (ref 19.6–45.3)
WBC # UR STRIP: ABNORMAL /HPF
WBC # UR STRIP: ABNORMAL /HPF
WBC CLUMPS # UR AUTO: ABNORMAL /HPF
WBC NRBC COR # BLD: 13.78 10*3/MM3 (ref 3.4–10.8)
WBC NRBC COR # BLD: 5.22 10*3/MM3 (ref 3.4–10.8)
WHOLE BLOOD HOLD COAG: NORMAL
WHOLE BLOOD HOLD SPECIMEN: NORMAL

## 2023-10-02 PROCEDURE — C1729 CATH, DRAINAGE: HCPCS

## 2023-10-02 PROCEDURE — 81001 URINALYSIS AUTO W/SCOPE: CPT | Performed by: EMERGENCY MEDICINE

## 2023-10-02 PROCEDURE — 81001 URINALYSIS AUTO W/SCOPE: CPT | Performed by: INTERNAL MEDICINE

## 2023-10-02 PROCEDURE — 87077 CULTURE AEROBIC IDENTIFY: CPT | Performed by: EMERGENCY MEDICINE

## 2023-10-02 PROCEDURE — 87040 BLOOD CULTURE FOR BACTERIA: CPT | Performed by: EMERGENCY MEDICINE

## 2023-10-02 PROCEDURE — 80053 COMPREHEN METABOLIC PANEL: CPT

## 2023-10-02 PROCEDURE — 25010000002 MIDAZOLAM HCL (PF) 10 MG/2ML SOLUTION: Performed by: RADIOLOGY

## 2023-10-02 PROCEDURE — 25810000003 SODIUM CHLORIDE 0.9 % SOLUTION: Performed by: INTERNAL MEDICINE

## 2023-10-02 PROCEDURE — 80053 COMPREHEN METABOLIC PANEL: CPT | Performed by: INTERNAL MEDICINE

## 2023-10-02 PROCEDURE — 99285 EMERGENCY DEPT VISIT HI MDM: CPT

## 2023-10-02 PROCEDURE — 74176 CT ABD & PELVIS W/O CONTRAST: CPT

## 2023-10-02 PROCEDURE — 85730 THROMBOPLASTIN TIME PARTIAL: CPT | Performed by: UROLOGY

## 2023-10-02 PROCEDURE — 25010000002 FENTANYL CITRATE (PF) 50 MCG/ML SOLUTION: Performed by: RADIOLOGY

## 2023-10-02 PROCEDURE — 36415 COLL VENOUS BLD VENIPUNCTURE: CPT

## 2023-10-02 PROCEDURE — 25010000002 FENTANYL CITRATE (PF) 50 MCG/ML SOLUTION: Performed by: EMERGENCY MEDICINE

## 2023-10-02 PROCEDURE — 25010000002 CEFTRIAXONE PER 250 MG: Performed by: EMERGENCY MEDICINE

## 2023-10-02 PROCEDURE — 25010000002 ONDANSETRON PER 1 MG: Performed by: EMERGENCY MEDICINE

## 2023-10-02 PROCEDURE — 83605 ASSAY OF LACTIC ACID: CPT

## 2023-10-02 PROCEDURE — 85610 PROTHROMBIN TIME: CPT | Performed by: UROLOGY

## 2023-10-02 PROCEDURE — 85007 BL SMEAR W/DIFF WBC COUNT: CPT | Performed by: INTERNAL MEDICINE

## 2023-10-02 PROCEDURE — 25010000002 MORPHINE PER 10 MG: Performed by: EMERGENCY MEDICINE

## 2023-10-02 PROCEDURE — 85025 COMPLETE CBC W/AUTO DIFF WBC: CPT | Performed by: INTERNAL MEDICINE

## 2023-10-02 PROCEDURE — 25810000003 SODIUM CHLORIDE 0.9 % SOLUTION: Performed by: EMERGENCY MEDICINE

## 2023-10-02 PROCEDURE — 83690 ASSAY OF LIPASE: CPT

## 2023-10-02 PROCEDURE — 0T9330Z DRAINAGE OF RIGHT KIDNEY PELVIS WITH DRAINAGE DEVICE, PERCUTANEOUS APPROACH: ICD-10-PCS | Performed by: UROLOGY

## 2023-10-02 PROCEDURE — 87086 URINE CULTURE/COLONY COUNT: CPT | Performed by: INTERNAL MEDICINE

## 2023-10-02 PROCEDURE — 85025 COMPLETE CBC W/AUTO DIFF WBC: CPT

## 2023-10-02 PROCEDURE — 87186 SC STD MICRODIL/AGAR DIL: CPT | Performed by: EMERGENCY MEDICINE

## 2023-10-02 PROCEDURE — 25010000002 ONDANSETRON PER 1 MG: Performed by: INTERNAL MEDICINE

## 2023-10-02 PROCEDURE — 25010000002 ONDANSETRON PER 1 MG: Performed by: RADIOLOGY

## 2023-10-02 PROCEDURE — 99222 1ST HOSP IP/OBS MODERATE 55: CPT | Performed by: UROLOGY

## 2023-10-02 PROCEDURE — 87086 URINE CULTURE/COLONY COUNT: CPT | Performed by: EMERGENCY MEDICINE

## 2023-10-02 RX ORDER — NOREPINEPHRINE BITARTRATE 0.03 MG/ML
.02-.3 INJECTION, SOLUTION INTRAVENOUS
Status: DISCONTINUED | OUTPATIENT
Start: 2023-10-02 | End: 2023-10-04

## 2023-10-02 RX ORDER — SODIUM CHLORIDE 0.9 % (FLUSH) 0.9 %
10 SYRINGE (ML) INJECTION EVERY 12 HOURS SCHEDULED
Status: DISCONTINUED | OUTPATIENT
Start: 2023-10-02 | End: 2023-10-06 | Stop reason: HOSPADM

## 2023-10-02 RX ORDER — POLYETHYLENE GLYCOL 3350 17 G/17G
17 POWDER, FOR SOLUTION ORAL DAILY PRN
Status: DISCONTINUED | OUTPATIENT
Start: 2023-10-02 | End: 2023-10-06 | Stop reason: HOSPADM

## 2023-10-02 RX ORDER — MIDAZOLAM HYDROCHLORIDE 10 MG/2ML
INJECTION, SOLUTION INTRAMUSCULAR; INTRAVENOUS AS NEEDED
Status: COMPLETED | OUTPATIENT
Start: 2023-10-02 | End: 2023-10-02

## 2023-10-02 RX ORDER — ONDANSETRON 2 MG/ML
INJECTION INTRAMUSCULAR; INTRAVENOUS AS NEEDED
Status: COMPLETED | OUTPATIENT
Start: 2023-10-02 | End: 2023-10-02

## 2023-10-02 RX ORDER — ONDANSETRON 2 MG/ML
4 INJECTION INTRAMUSCULAR; INTRAVENOUS EVERY 6 HOURS PRN
Status: DISCONTINUED | OUTPATIENT
Start: 2023-10-02 | End: 2023-10-06 | Stop reason: HOSPADM

## 2023-10-02 RX ORDER — ACETAMINOPHEN 325 MG/1
650 TABLET ORAL EVERY 4 HOURS PRN
Status: DISCONTINUED | OUTPATIENT
Start: 2023-10-02 | End: 2023-10-06 | Stop reason: HOSPADM

## 2023-10-02 RX ORDER — SODIUM CHLORIDE 0.9 % (FLUSH) 0.9 %
10 SYRINGE (ML) INJECTION AS NEEDED
Status: DISCONTINUED | OUTPATIENT
Start: 2023-10-02 | End: 2023-10-06 | Stop reason: HOSPADM

## 2023-10-02 RX ORDER — ALUMINA, MAGNESIA, AND SIMETHICONE 2400; 2400; 240 MG/30ML; MG/30ML; MG/30ML
15 SUSPENSION ORAL EVERY 6 HOURS PRN
Status: DISCONTINUED | OUTPATIENT
Start: 2023-10-02 | End: 2023-10-06 | Stop reason: HOSPADM

## 2023-10-02 RX ORDER — AMOXICILLIN 250 MG
2 CAPSULE ORAL 2 TIMES DAILY
Status: DISCONTINUED | OUTPATIENT
Start: 2023-10-02 | End: 2023-10-06 | Stop reason: HOSPADM

## 2023-10-02 RX ORDER — BISACODYL 5 MG/1
5 TABLET, DELAYED RELEASE ORAL DAILY PRN
Status: DISCONTINUED | OUTPATIENT
Start: 2023-10-02 | End: 2023-10-06 | Stop reason: HOSPADM

## 2023-10-02 RX ORDER — FENTANYL CITRATE 50 UG/ML
INJECTION, SOLUTION INTRAMUSCULAR; INTRAVENOUS AS NEEDED
Status: COMPLETED | OUTPATIENT
Start: 2023-10-02 | End: 2023-10-02

## 2023-10-02 RX ORDER — DEXTROSE AND SODIUM CHLORIDE 5; .45 G/100ML; G/100ML
100 INJECTION, SOLUTION INTRAVENOUS CONTINUOUS
Status: DISCONTINUED | OUTPATIENT
Start: 2023-10-02 | End: 2023-10-03

## 2023-10-02 RX ORDER — SODIUM CHLORIDE 9 MG/ML
40 INJECTION, SOLUTION INTRAVENOUS AS NEEDED
Status: DISCONTINUED | OUTPATIENT
Start: 2023-10-02 | End: 2023-10-06 | Stop reason: HOSPADM

## 2023-10-02 RX ORDER — SODIUM CHLORIDE, SODIUM LACTATE, POTASSIUM CHLORIDE, CALCIUM CHLORIDE 600; 310; 30; 20 MG/100ML; MG/100ML; MG/100ML; MG/100ML
100 INJECTION, SOLUTION INTRAVENOUS CONTINUOUS
Status: DISCONTINUED | OUTPATIENT
Start: 2023-10-03 | End: 2023-10-05

## 2023-10-02 RX ORDER — ONDANSETRON 2 MG/ML
INJECTION INTRAMUSCULAR; INTRAVENOUS
Status: DISPENSED
Start: 2023-10-02 | End: 2023-10-03

## 2023-10-02 RX ORDER — MIDAZOLAM HYDROCHLORIDE 2 MG/2ML
INJECTION, SOLUTION INTRAMUSCULAR; INTRAVENOUS
Status: DISPENSED
Start: 2023-10-02 | End: 2023-10-03

## 2023-10-02 RX ORDER — ONDANSETRON 2 MG/ML
4 INJECTION INTRAMUSCULAR; INTRAVENOUS ONCE
Status: COMPLETED | OUTPATIENT
Start: 2023-10-02 | End: 2023-10-02

## 2023-10-02 RX ORDER — BISACODYL 10 MG
10 SUPPOSITORY, RECTAL RECTAL DAILY PRN
Status: DISCONTINUED | OUTPATIENT
Start: 2023-10-02 | End: 2023-10-06 | Stop reason: HOSPADM

## 2023-10-02 RX ORDER — FENTANYL CITRATE 50 UG/ML
INJECTION, SOLUTION INTRAMUSCULAR; INTRAVENOUS
Status: DISPENSED
Start: 2023-10-02 | End: 2023-10-03

## 2023-10-02 RX ORDER — LIDOCAINE HYDROCHLORIDE 20 MG/ML
20 INJECTION, SOLUTION INFILTRATION; PERINEURAL ONCE
Status: COMPLETED | OUTPATIENT
Start: 2023-10-02 | End: 2023-10-02

## 2023-10-02 RX ORDER — CEFTRIAXONE SODIUM 1 G/50ML
1000 INJECTION, SOLUTION INTRAVENOUS ONCE
Status: COMPLETED | OUTPATIENT
Start: 2023-10-02 | End: 2023-10-02

## 2023-10-02 RX ORDER — SODIUM CHLORIDE 9 MG/ML
125 INJECTION, SOLUTION INTRAVENOUS ONCE
Status: COMPLETED | OUTPATIENT
Start: 2023-10-02 | End: 2023-10-02

## 2023-10-02 RX ORDER — FENTANYL CITRATE 50 UG/ML
50 INJECTION, SOLUTION INTRAMUSCULAR; INTRAVENOUS ONCE
Status: COMPLETED | OUTPATIENT
Start: 2023-10-02 | End: 2023-10-02

## 2023-10-02 RX ADMIN — LIDOCAINE HYDROCHLORIDE 20 ML: 20 INJECTION, SOLUTION INFILTRATION; PERINEURAL at 17:00

## 2023-10-02 RX ADMIN — SODIUM CHLORIDE 125 ML/HR: 9 INJECTION, SOLUTION INTRAVENOUS at 21:16

## 2023-10-02 RX ADMIN — ONDANSETRON 4 MG: 2 INJECTION INTRAMUSCULAR; INTRAVENOUS at 22:22

## 2023-10-02 RX ADMIN — ONDANSETRON 4 MG: 2 INJECTION INTRAMUSCULAR; INTRAVENOUS at 14:59

## 2023-10-02 RX ADMIN — FENTANYL CITRATE 50 MCG: 50 INJECTION, SOLUTION INTRAMUSCULAR; INTRAVENOUS at 17:07

## 2023-10-02 RX ADMIN — CEFTRIAXONE SODIUM 1000 MG: 1 INJECTION, SOLUTION INTRAVENOUS at 16:45

## 2023-10-02 RX ADMIN — MIDAZOLAM HYDROCHLORIDE 1 MG: 5 INJECTION, SOLUTION INTRAMUSCULAR; INTRAVENOUS at 16:53

## 2023-10-02 RX ADMIN — Medication 10 ML: at 14:59

## 2023-10-02 RX ADMIN — ONDANSETRON 4 MG: 2 INJECTION INTRAMUSCULAR; INTRAVENOUS at 17:10

## 2023-10-02 RX ADMIN — MORPHINE SULFATE 4 MG: 4 INJECTION, SOLUTION INTRAMUSCULAR; INTRAVENOUS at 14:54

## 2023-10-02 RX ADMIN — FENTANYL CITRATE 50 MCG: 50 INJECTION, SOLUTION INTRAMUSCULAR; INTRAVENOUS at 16:23

## 2023-10-02 RX ADMIN — SODIUM CHLORIDE 1000 ML: 9 INJECTION, SOLUTION INTRAVENOUS at 21:15

## 2023-10-02 RX ADMIN — NOREPINEPHRINE BITARTRATE 0.02 MCG/KG/MIN: 1 INJECTION, SOLUTION, CONCENTRATE INTRAVENOUS at 22:12

## 2023-10-02 NOTE — CONSULTS
[facility]   UROLOGY Consult Note    Patient Name: Marnie Parra  : 1945  MRN: [id number]  Primary Care Physician:  Nicolas Velasquez MD  Referring Physician: No ref. provider found  Date of admission: 10/2/2023    Subjective   Subjective     Reason for Consult/ Chief Complaint: Right flank pain    HPI:  Marnie Parra is a 78 y.o. female presented to ER with complaint of right-sided flank pain.  History significant for bladder cancer, status post radical cystectomy with [place] pouch by Dr. Ruby.  Also with history of nephrolithiasis.  Patient complained of right flank pain to PCP, Dr. Cole to obtain CT scan which indicated obstructive uropathy on the right secondary to a 1 cm distal ureteral stone with severe associated hydronephrosis.  She was then instructed to present to the ER.    IR facilitated nephrostomy tube placement.  At time of consultation, patient has returned from IR with nephrostomy tube in place.  Continues to complain of significant right-sided pain.  Also discomfort because she needs to catheterize her [place] pouch.    Further work-up in ER reveals WBC 13.78; creatinine 0.67; UA consistent with patient of [place] pouch, culture pending.  Urine culture also pending from nephrostomy tube placement.  Blood cultures pending.    Urology consulted for evaluation.      Review of Systems   All systems were reviewed and negative except for: ER H&P in the above    Personal History     Past Medical History:   Diagnosis Date    Arthritis     Cancer     GERD (gastroesophageal reflux disease)     Hyperlipidemia     Hypertension     Kidney stone     Stroke        Past Surgical History:   Procedure Laterality Date    ABDOMINAL SURGERY      APPENDECTOMY      COLONOSCOPY      CYSTECTOMY      bladder removal, with pouch made from stomach    HERNIA REPAIR      HYSTERECTOMY      KIDNEY STONE SURGERY      TUBAL ABDOMINAL LIGATION         Family History: family history is not on file. Otherwise  pertinent FHx was reviewed and not pertinent to current issue.    Social History:  reports that she has been smoking cigarettes. She started smoking about 57 years ago. She has been smoking an average of 1 pack per day. She has never used smokeless tobacco. She reports that she does not drink alcohol and does not use drugs.    Home Medications:  HYDROcodone-acetaminophen, (Ibuprofen 3 %, Gabapentin 10 %, Baclofen 2 %, lidocaine 4 %, Ketamine HCl 10 %), amLODIPine, aspirin, magnesium oxide, metoprolol succinate XL, pantoprazole, potassium citrate, and sennosides-docusate    Allergies:  Allergies   Allergen Reactions    Levofloxacin Other (See Comments)     TORE ROTATOR CUFF  Other reaction(s): Rotator cuff tear arthropathy      Propofol GI Intolerance    Gabapentin Other (See Comments) and Myalgia    Hydromorphone Hallucinations, Mental Status Change and Other (See Comments)     Dilaudid causes the patient to hallucinate.    Propranolol Unknown - Low Severity    Adhesive Tape Rash    Celecoxib Unknown - High Severity and Rash       Objective    Objective     Vitals:   Temp:  [98.6 °F (37 °C)] 98.6 °F (37 °C)  Heart Rate:  [63-99] 71  Resp:  [10-21] 16  BP: ()/(36-77) 82/36    Physical Exam:   Appears uncomfortable, thin   lungs: Clear, unlabored  Awake alert and oriented  Mood normal; affect normal  Right nephrostomy tube in place, clear  Pain [place] pouch stoma    Result Review    Result Review:  I have personally reviewed the results from the time of this admission to 10/2/2023 18:28 EDT and agree with these findings:  [x]  Laboratory  [x]  Microbiology  [x]  Radiology  []  EKG/Telemetry   []  Cardiology/Vascular   []  Pathology  []  Old records  []  Other:      Assessment & Plan   Assessment / Plan     Brief Patient Summary:  Marnie Parra is a 78 y.o. female who presented with right obstructive uropathy secondary to a 1 cm distal ureteral stone, history of radical cystectomy with [place] pouch status  post right nephrostomy tube by interventional radiology    Active Hospital Problems:  Active Hospital Problems    Diagnosis     **Ureterolithiasis        Plan:   Chart reviewed    Had discussion with [facility] u[facility]. Dr. Burns regarding patient's status.  Would not warrant tertiary transfer at this time.    Patient to maintain right nephrostomy tube  Okay for discharge once medically ready    Will warrant outpatient follow-up with [facility] u[facility], Dr. Ruby for discussion of definitive stone management.    Do not anticipate acute urologic intervention  Discussed with primary service      Electronically signed by Kay Greene MD, 10/02/23, 6:28 PM EDT.

## 2023-10-02 NOTE — Clinical Note
Level of Care: Telemetry [5]   Diagnosis: Kidney stones, calcium oxalate dihydrate [225987]   Admitting Physician: JACKIE COREA [[id number]]   Attending Physician: JACKIE COREA [[id number]]

## 2023-10-02 NOTE — ED NOTES
Spoke to Dr. Velasquez regarding changing patient's room to PCU, since patient's vitals dropped due to increase in pain medication while completing urostomy tube placement. Bed board made aware. Dr. Velasquez to input new orders.

## 2023-10-02 NOTE — ED PROVIDER NOTES
Time: 5:19 PM EDT  Date of encounter:  10/2/2023  Independent Historian/Clinical History and Information was obtained by:   Patient and Family    History is limited by: N/A    Chief Complaint: Right flank pain      History of Present Illness:  Patient is a 78 y.o. year old female who presents to the emergency department for evaluation of right flank pain.  Patient has a history of kidney stones as well as previous cystectomy due to bladder cancer.  She had an outpatient CT scan of the abdomen pelvis today that revealed a 1 cm distal right ureteral stone.  On CT report the stone does cause severe hydronephrosis with obstructive uropathy.    HPI    Patient Care Team  Primary Care Provider: Nicolas Velasquez MD    Past Medical History:     Allergies   Allergen Reactions    Levofloxacin Other (See Comments)     TORE ROTATOR CUFF  Other reaction(s): Rotator cuff tear arthropathy      Propofol GI Intolerance    Gabapentin Other (See Comments) and Myalgia    Hydromorphone Hallucinations, Mental Status Change and Other (See Comments)     Dilaudid causes the patient to hallucinate.    Propranolol Unknown - Low Severity    Adhesive Tape Rash    Celecoxib Unknown - High Severity and Rash     Past Medical History:   Diagnosis Date    Arthritis     Cancer     GERD (gastroesophageal reflux disease)     Hyperlipidemia     Hypertension     Kidney stone     Stroke      Past Surgical History:   Procedure Laterality Date    ABDOMINAL SURGERY      APPENDECTOMY      COLONOSCOPY      CYSTECTOMY      bladder removal, with pouch made from stomach    HERNIA REPAIR      HYSTERECTOMY      KIDNEY STONE SURGERY      TUBAL ABDOMINAL LIGATION       History reviewed. No pertinent family history.    Home Medications:  Prior to Admission medications    Medication Sig Start Date End Date Taking? Authorizing Provider   amLODIPine (NORVASC) 2.5 MG tablet  7/5/21   Provider, MD Pavel   aspirin 81 MG EC tablet Take 1 tablet by mouth Daily.     Pavel Sanon MD   HYDROcodone-acetaminophen (NORCO) 5-325 MG per tablet Take 1 tablet by mouth Every 6 (Six) Hours As Needed. 2/14/23   Pavel Sanon MD   Ibuprofen 3 %, Gabapentin 10 %, Baclofen 2 %, lidocaine 4 %, Ketamine HCl 10 % Apply 1-2 g topically to the appropriate area as directed 3 (Three) to 4 (Four) times daily. 9/6/23 9/5/24  Dennis Álvarez MD   MAGnesium-Oxide 400 (241.3 Mg) MG tablet tablet  7/29/21   Pavel Sanon MD   metoprolol succinate XL (TOPROL-XL) 25 MG 24 hr tablet Take 0.5 tablets by mouth Daily.    Pavel Sanon MD   pantoprazole (PROTONIX) 40 MG EC tablet  9/29/21   Pavel Sanon MD   potassium citrate (UROCIT-K) 10 MEQ (1080 MG) CR tablet  9/18/22   Pavel Sanon MD   Senna-Plus 8.6-50 MG per tablet Take 2 tablets by mouth At Night As Needed. 2/14/23   Pavel Sanon MD        Social History:   Social History     Tobacco Use    Smoking status: Every Day     Packs/day: 1.00     Types: Cigarettes     Start date: 1/5/1966    Smokeless tobacco: Never   Vaping Use    Vaping Use: Never used   Substance Use Topics    Alcohol use: Never    Drug use: Never         Review of Systems:  Review of Systems   Constitutional:  Negative for chills and fever.   HENT:  Negative for congestion, rhinorrhea and sore throat.    Eyes:  Negative for pain and visual disturbance.   Respiratory:  Negative for apnea, cough, chest tightness and shortness of breath.    Cardiovascular:  Negative for chest pain and palpitations.   Gastrointestinal:  Negative for abdominal pain, diarrhea, nausea and vomiting.   Genitourinary:  Positive for flank pain. Negative for difficulty urinating and dysuria.   Musculoskeletal:  Negative for joint swelling and myalgias.   Skin:  Negative for color change.   Neurological:  Negative for seizures and headaches.   Psychiatric/Behavioral: Negative.     All other systems reviewed and are negative.     Physical Exam:  BP (!) 82/36   " Pulse 71 Comment: sr  Temp 98.6 °F (37 °C) (Oral)   Resp 16   Ht 172.7 cm (68\")   Wt 59.9 kg (132 lb 0.9 oz)   SpO2 96% Comment: 2l  BMI 20.08 kg/m²     Physical Exam  Vitals and nursing note reviewed.   Constitutional:       General: She is not in acute distress.     Appearance: Normal appearance. She is not toxic-appearing.   HENT:      Head: Normocephalic and atraumatic.      Jaw: There is normal jaw occlusion.   Eyes:      General: Lids are normal.      Extraocular Movements: Extraocular movements intact.      Conjunctiva/sclera: Conjunctivae normal.      Pupils: Pupils are equal, round, and reactive to light.   Cardiovascular:      Rate and Rhythm: Normal rate and regular rhythm.      Pulses: Normal pulses.      Heart sounds: Normal heart sounds.   Pulmonary:      Effort: Pulmonary effort is normal. No respiratory distress.      Breath sounds: Normal breath sounds. No wheezing or rhonchi.   Abdominal:      General: Abdomen is flat.      Palpations: Abdomen is soft.      Tenderness: There is no abdominal tenderness. There is right CVA tenderness. There is no guarding or rebound.   Musculoskeletal:         General: Normal range of motion.      Cervical back: Normal range of motion and neck supple.      Right lower leg: No edema.      Left lower leg: No edema.   Skin:     General: Skin is warm and dry.   Neurological:      Mental Status: She is alert and oriented to person, place, and time. Mental status is at baseline.   Psychiatric:         Mood and Affect: Mood normal.                Procedures:  Procedures      Medical Decision Making:      Comorbidities that affect care:    Kidney stones, previous cystectomy    External Notes reviewed:    None      The following orders were placed and all results were independently analyzed by me:  Orders Placed This Encounter   Procedures    Blood Culture - Blood,    Blood Culture - Blood,    Urine Culture - Urine,    Urine Culture - Urine,    CT Guided Nephrostomy " Tube Placement    Smilax Draw    Comprehensive Metabolic Panel    Lipase    Urinalysis With Microscopic If Indicated (No Culture) - Urine, Clean Catch    Lactic Acid, Plasma    CBC Auto Differential    Urinalysis, Microscopic Only - Urine, Clean Catch    aPTT    Protime-INR    Comprehensive Metabolic Panel    Urinalysis With Culture If Indicated - Urine, Clean Catch    Urinalysis, Microscopic Only - Urine, Clean Catch    NPO Diet NPO Type: Strict NPO    Undress & Gown    Urology (on-call MD unless specified)    IP General Consult (Use specialty-specific consult if known)    Insert Peripheral IV    Initiate Observation Status    CBC & Differential    Green Top (Gel)    Lavender Top    Gold Top - SST    Light Blue Top    CBC & Differential       Medications Given in the Emergency Department:  Medications   sodium chloride 0.9 % flush 10 mL (10 mL Intravenous Given 10/2/23 1459)   Midazolam HCl (PF) (VERSED) 2 MG/2ML injection  - ADS Override Pull (has no administration in time range)   HYDROmorphone (DILAUDID) injection 0.5 mg (has no administration in time range)   dextrose 5 % and sodium chloride 0.45 % infusion (has no administration in time range)   fentaNYL citrate (PF) (SUBLIMAZE) 50 mcg/mL injection  - ADS Override Pull (has no administration in time range)   ondansetron (ZOFRAN) 2 mg/mL injection  - ADS Override Pull (has no administration in time range)   morphine injection 4 mg (4 mg Intravenous Given 10/2/23 1454)   ondansetron (ZOFRAN) injection 4 mg (4 mg Intravenous Given 10/2/23 1459)   cefTRIAXone (ROCEPHIN) IVPB 1,000 mg (1,000 mg Intravenous New Bag 10/2/23 1645)   fentaNYL citrate (PF) (SUBLIMAZE) injection 50 mcg (50 mcg Intravenous Given 10/2/23 1623)   lidocaine (XYLOCAINE) 2% injection 20 mL (20 mL Infiltration Given 10/2/23 1700)   Midazolam HCl (PF) (VERSED) injection (1 mg Intravenous Given 10/2/23 1653)   fentaNYL citrate (PF) (SUBLIMAZE) injection (50 mcg Intravenous Given 10/2/23 1707)    ondansetron (ZOFRAN) injection (4 mg Intravenous Given 10/2/23 1710)        ED Course:         Labs:    Lab Results (last 24 hours)       Procedure Component Value Units Date/Time    CBC & Differential [[id number]]  (Abnormal) Collected: 10/02/23 1312    Specimen: Blood from Arm, Right Updated: 10/02/23 1327    Narrative:      The following orders were created for panel order CBC & Differential.  Procedure                               Abnormality         Status                     ---------                               -----------         ------                     CBC Auto Differential[[id number]]        Abnormal            Final result                 Please view results for these tests on the individual orders.    Comprehensive Metabolic Panel [[id number]]  (Abnormal) Collected: 10/02/23 1312    Specimen: Blood from Arm, Right Updated: 10/02/23 1343     Glucose 115 mg/dL      BUN 16 mg/dL      Creatinine 0.67 mg/dL      Sodium 135 mmol/L      Potassium 4.1 mmol/L      Chloride 104 mmol/L      CO2 23.6 mmol/L      Calcium 9.2 mg/dL      Total Protein 6.9 g/dL      Albumin 3.9 g/dL      ALT (SGPT) 20 U/L      AST (SGOT) 17 U/L      Alkaline Phosphatase 95 U/L      Total Bilirubin 0.3 mg/dL      Globulin 3.0 gm/dL      A/G Ratio 1.3 g/dL      BUN/Creatinine Ratio 23.9     Anion Gap 7.4 mmol/L      eGFR 90.2 mL/min/1.73     Narrative:      GFR Normal >60  Chronic Kidney Disease <60  Kidney Failure <15    The GFR formula is only valid for adults with stable renal function between ages 18 and 70.    Lipase [[id number]]  (Normal) Collected: 10/02/23 1312    Specimen: Blood from Arm, Right Updated: 10/02/23 1343     Lipase 33 U/L     Lactic Acid, Plasma [[id number]]  (Normal) Collected: 10/02/23 1312    Specimen: Blood from Arm, Right Updated: 10/02/23 1339     Lactate 0.9 mmol/L     CBC Auto Differential [[id number]]  (Abnormal) Collected: 10/02/23 1312    Specimen: Blood from Arm, Right Updated: 10/02/23 1327     WBC  13.78 10*3/mm3      RBC 4.74 10*6/mm3      Hemoglobin 14.1 g/dL      Hematocrit 43.3 %      MCV 91.4 fL      MCH 29.7 pg      MCHC 32.6 g/dL      RDW 13.2 %      RDW-SD 44.5 fl      MPV 9.6 fL      Platelets 328 10*3/mm3      Neutrophil % 85.4 %      Lymphocyte % 6.7 %      Monocyte % 6.6 %      Eosinophil % 0.5 %      Basophil % 0.4 %      Immature Grans % 0.4 %      Neutrophils, Absolute 11.76 10*3/mm3      Lymphocytes, Absolute 0.93 10*3/mm3      Monocytes, Absolute 0.91 10*3/mm3      Eosinophils, Absolute 0.07 10*3/mm3      Basophils, Absolute 0.05 10*3/mm3      Immature Grans, Absolute 0.06 10*3/mm3      nRBC 0.0 /100 WBC     aPTT [[id number]]  (Normal) Collected: 10/02/23 1312    Specimen: Blood from Arm, Right Updated: 10/02/23 1614     PTT 26.9 seconds     Protime-INR [[id number]]  (Normal) Collected: 10/02/23 1312    Specimen: Blood from Arm, Right Updated: 10/02/23 1601     Protime 12.1 Seconds      INR 0.88    Narrative:      Suggested Therapeutic Ranges For Oral Anticoagulant Therapy:  Level of Therapy                      INR Target Range  Standard Dose                            2.0-3.0  High Dose                                2.5-3.5  Patients not receiving anticoagulant  Therapy Normal Range                     0.86-1.15    Urinalysis With Microscopic If Indicated (No Culture) - Urine, Clean Catch [[id number]]  (Abnormal) Collected: 10/02/23 1404    Specimen: Urine, Clean Catch Updated: 10/02/23 1415     Color, UA Dark Yellow     Appearance, UA Turbid     pH, UA >=9.0     Specific Gravity, UA 1.013     Glucose, UA Negative     Ketones, UA Negative     Bilirubin, UA Negative     Blood, UA Large (3+)     Protein,  mg/dL (2+)     Leuk Esterase, UA Large (3+)     Nitrite, UA Negative     Urobilinogen, UA 1.0 E.U./dL    Urinalysis, Microscopic Only - Urine, Clean Catch [[id number]]  (Abnormal) Collected: 10/02/23 1404    Specimen: Urine, Clean Catch Updated: 10/02/23 1426     RBC, UA 21-30 /HPF       WBC, UA Too Numerous to Count /HPF      Bacteria, UA 3+ /HPF      Squamous Epithelial Cells, UA None Seen /HPF      Hyaline Casts, UA None Seen /LPF      Triple Phosphate Crystals, UA Small/1+ /HPF      Amorphous Crystals, UA Small/1+ /HPF      Mucus, UA Trace /HPF      Methodology Manual Light Microscopy    Urine Culture - Urine, Urine, Clean Catch [[id number]] Collected: 10/02/23 1404    Specimen: Urine, Clean Catch Updated: 10/02/23 1615    Blood Culture - Blood, Arm, Right [[id number]] Collected: 10/02/23 1624    Specimen: Blood from Arm, Right Updated: 10/02/23 1635    Blood Culture - Blood, Arm, Left [[id number]] Collected: 10/02/23 1624    Specimen: Blood from Arm, Left Updated: 10/02/23 1636    Urinalysis With Culture If Indicated - Nephrostomy Right [[id number]] Collected: 10/02/23 1721    Specimen: Urine from Nephrostomy Right Updated: 10/02/23 1735    Urinalysis, Microscopic Only - Nephrostomy Right [[id number]] Collected: 10/02/23 1721    Specimen: Urine from Nephrostomy Right Updated: 10/02/23 1740    Urine Culture - Urine, Nephrostomy Right [[id number]] Collected: 10/02/23 1721    Specimen: Urine from Nephrostomy Right Updated: 10/02/23 1740             Imaging:    CT Abdomen Pelvis Stone Protocol    Result Date: 10/2/2023  PROCEDURE: CT ABDOMEN PELVIS STONE PROTOCOL  COMPARISON: [facility], CT, CT ABDOMEN PELVIS W CONTRAST, 2/17/2023, 19:06.  INDICATIONS: RIGHT SIDE ABDOMEN PAIN SINCE 07:30 TODAY  TECHNIQUE: CT images were created without intravenous contrast.   PROTOCOL:   Standard imaging protocol performed    RADIATION:   DLP:356.2 mGy*cm   Automated exposure control was utilized to minimize radiation dose.  FINDINGS:  Lower chest:  Tree-in-bud and ground-glass nodules in the lung bases  Organs:  Status post cystectomy with cecal reservoir in the right lower quadrant.  1 cm calculus in the distal right ureter approximately 2 cm proximal to the cecal anastomosis, resulting in severe  hydroureteronephrosis.  Additional small right calyceal stone noted.  No left-sided urolithiasis or hydronephrosis.  Liver, spleen, pancreas have an unremarkable noncontrast appearance.  Stable low-attenuation adrenal nodules compatible with adenomas  GI tract:  No acute abnormality.  Diverticula of the sigmoid colon with no associated inflammation  Pelvis:  No abnormal fluid collection.  Status post cystectomy and hysterectomy  Peritoneum/retroperitoneum:  No ascites or pneumoperitoneum.  No retroperitoneal adenopathy.  Normal caliber aorta.  Retroaortic left renal vein  Bones/soft tissues:  Status post mesh abdominal wall hernia repair.  No acute or suspicious bony abnormality.  Stable sclerotic lesion in L4 compatible with a bone island         1. 1 cm distal right ureteral calculus resulting severe obstructive uropathy, status post cystectomy with cecal reservoir  2. Bronchiolitis changes in the lung bases  These results were called to the ordering clinician's office at 11:03 a.m.     RICARDO MAZA MD       Electronically Signed and Approved By: RICARDO MAZA MD on 10/02/2023 at 11:04                Differential Diagnosis and Discussion:    Abdominal Pain: Based on the patient's signs and symptoms, I considered abdominal aortic aneurysm, small bowel obstruction, pancreatitis, acute cholecystitis, acute appendecitis, peptic ulcer disease, gastritis, colitis, endocrine disorders, irritable bowel syndrome and other differential diagnosis an etiology of the patient's abdominal pain.    All labs were reviewed and interpreted by me.  CT scan radiology impression was interpreted by me.    MDM  Number of Diagnoses or Management Options  Obstructive uropathy  Diagnosis management comments: In summary this is a 78-year-old female patient presents to the emergency department for evaluation of flank pain.  She had an outpatient CT scan of the abdomen pelvis today that showed a large distal right ureterolithiasis with  obstructive uropathy.  On review of the CT she has a 1 cm distal ureteral stone.  Urology has been consulted and is worked with IR to arrange intervention.  CBC independently reviewed by me and shows no critical abnormalities.  CMP independently reviewed by me and shows no critical abnormalities.  Urinalysis positive however patient does have conduit which may be the cause of the positivity of the urinalysis.  Patient was however given antibiotics.  Patient case has been discussed with the PCP team who will admit to the hospital for further evaluation and continuation of treatment.             Patient Care Considerations:    CT ABDOMEN AND PELVIS: I considered ordering a CT scan of the abdomen and pelvis however patient already had a CT abdomen pelvis today      Consultants/Shared Management Plan:    Consultant: I have discussed the case with Dr. Greene who agrees to consult on the patient.  PCP: I have discussed the case with Dr. Corea who agrees to accept the patient for admission.    Social Determinants of Health:    Patient is independent, reliable, and has access to care.       Disposition and Care Coordination:    Admit:   Through independent evaluation of the patient's history, physical, and imperical data, the patient meets criteria for observation/admission to the hospital.        Final diagnoses:   Obstructive uropathy        ED Disposition       ED Disposition   Decision to Admit    Condition   --    Comment   Level of Care: Med/Surg [1]   Diagnosis: Ureterolithiasis [006965]   Admitting Physician: JACKIE COREA [[id number]]   Attending Physician: JACKIE COREA [[id number]]                 This medical record created using voice recognition software.             Rodrigue Putnam MD  10/02/23 1747

## 2023-10-02 NOTE — H&P
[facility]   HISTORY AND PHYSICAL    Patient Name: Marnie Parra  : 1945  MRN: [id number]  Primary Care Physician:  Nicolas Velasquez MD  Date of admission: 10/2/2023    Subjective   Subjective     Chief Complaint:patient is complaining of very severe pain in the right side flank radiating to the neck she has had similar pain when she had kidney stones patient with history of for cystectomy and has a ileal pouch it has got some stones CT scan was done which shows severe stenosis with the ureterohydronephrosis I discussed the case with Dr. Carreon as already discussed with radiology to remove the stones with a stent,she has a history of bladder cancer for which she has had radical cystectomy with ileal pouch and by Dr. Ruby at the [facility]    HPI: patient with right-sided renal colic with history of bladder cancer    Marnie Parra is a 78 y.o. female history of bladder cancer with renal colic     Review of Systems   All systems were reviewed and negative except for: has been reviewed    Personal History     Past Medical History:   Diagnosis Date    Arthritis     Cancer     GERD (gastroesophageal reflux disease)     Hyperlipidemia     Hypertension     Kidney stone     Stroke        Past Surgical History:   Procedure Laterality Date    ABDOMINAL SURGERY      APPENDECTOMY      COLONOSCOPY      CYSTECTOMY      bladder removal, with pouch made from stomach    HERNIA REPAIR      HYSTERECTOMY      KIDNEY STONE SURGERY      TUBAL ABDOMINAL LIGATION         Family History: family history is not on file. Otherwise pertinent FHx was reviewed and not pertinent to current issue.    Social History:  reports that she has been smoking cigarettes. She started smoking about 57 years ago. She has been smoking an average of 1 pack per day. She has never used smokeless tobacco. She reports that she does not drink alcohol and does not use drugs.    Home Medications:  HYDROcodone-acetaminophen,  (Ibuprofen 3 %, Gabapentin 10 %, Baclofen 2 %, lidocaine 4 %, Ketamine HCl 10 %), amLODIPine, aspirin, magnesium oxide, metoprolol succinate XL, pantoprazole, potassium citrate, and sennosides-docusate      Allergies:  Allergies   Allergen Reactions    Levofloxacin Other (See Comments)     TORE ROTATOR CUFF  Other reaction(s): Rotator cuff tear arthropathy      Propofol GI Intolerance    Gabapentin Other (See Comments) and Myalgia    Hydromorphone Hallucinations, Mental Status Change and Other (See Comments)     Dilaudid causes the patient to hallucinate.    Propranolol Unknown - Low Severity    Adhesive Tape Rash    Celecoxib Unknown - High Severity and Rash       Objective   Objective     Vitals:   Temp:  [98.6 °F (37 °C)] 98.6 °F (37 °C)  Heart Rate:  [96-99] 96  Resp:  [18-21] 21  BP: (148-150)/(68-77) 150/68  Physical Exam    Constitutional: that and oriented but has acute pain the right flank to the right leg   Eyes: pupils equal reactive to light and accommodation    HENT: normal   Neck: normal   Respiratory: no rales or rhonchi   Cardiovascular: S1 and S2 normal no S3 or S4   Gastrointestinal: normal   Musculoskeletal: normal   Neurologic: no localizing signs  Skin: normal    Result Review    Result Review:  I have personally reviewed the results from the time of this admission to 10/2/2023 16:54 EDT and agree with these findings:  [x]  Laboratory  []  Microbiology  [x]  Radiology  []  EKG/Telemetry   []  Cardiology/Vascular   []  Pathology  []  Old records  []  Other:  Most notable findings include: has been reviewed    Assessment & Plan   Assessment / Plan     Brief Patient Summary:  Marnie Parra is a 78 y.o. female who she and with right-sided renal colic with ureteral stone    Active Hospital Problems:  Active Hospital Problems    Diagnosis     **Ureterolithiasis        Plan:   Urology consultation pain management IV fluid     DVT prophylaxis:  No DVT prophylaxis order currently exists.    CODE  STATUS:         Admission Status:  I believe this patient meets observation status.    Electronically signed by Nicolas Velasquez MD, 10/02/23, 4:54 PM EDT.      Part of this note may be an electronic transcription/translation of spoken language to printed text using the Dragon Dictation System.

## 2023-10-03 ENCOUNTER — APPOINTMENT (OUTPATIENT)
Dept: GENERAL RADIOLOGY | Facility: HOSPITAL | Age: 78
DRG: 871 | End: 2023-10-03
Payer: MEDICARE

## 2023-10-03 LAB
BACTERIA SPEC AEROBE CULT: NORMAL
D-LACTATE SERPL-SCNC: 1.6 MMOL/L (ref 0.5–2)
HOLD SPECIMEN: NORMAL
INR PPP: 1.18 (ref 0.86–1.15)
PROTHROMBIN TIME: 15.1 SECONDS (ref 11.8–14.9)
WHOLE BLOOD HOLD SPECIMEN: NORMAL

## 2023-10-03 PROCEDURE — 99231 SBSQ HOSP IP/OBS SF/LOW 25: CPT | Performed by: UROLOGY

## 2023-10-03 PROCEDURE — 25810000003 LACTATED RINGERS SOLUTION: Performed by: NURSE PRACTITIONER

## 2023-10-03 PROCEDURE — 85610 PROTHROMBIN TIME: CPT | Performed by: INTERNAL MEDICINE

## 2023-10-03 PROCEDURE — 74019 RADEX ABDOMEN 2 VIEWS: CPT

## 2023-10-03 PROCEDURE — 25010000002 CALCIUM GLUCONATE-NACL 1-0.675 GM/50ML-% SOLUTION: Performed by: NURSE PRACTITIONER

## 2023-10-03 PROCEDURE — 83605 ASSAY OF LACTIC ACID: CPT | Performed by: NURSE PRACTITIONER

## 2023-10-03 PROCEDURE — 25810000003 LACTATED RINGERS PER 1000 ML: Performed by: INTERNAL MEDICINE

## 2023-10-03 RX ORDER — MULTIPLE VITAMINS W/ MINERALS TAB 9MG-400MCG
1 TAB ORAL DAILY
COMMUNITY

## 2023-10-03 RX ORDER — CALCIUM GLUCONATE 20 MG/ML
1000 INJECTION, SOLUTION INTRAVENOUS ONCE
Status: COMPLETED | OUTPATIENT
Start: 2023-10-03 | End: 2023-10-03

## 2023-10-03 RX ORDER — POTASSIUM CHLORIDE 750 MG/1
40 CAPSULE, EXTENDED RELEASE ORAL ONCE
Status: COMPLETED | OUTPATIENT
Start: 2023-10-03 | End: 2023-10-03

## 2023-10-03 RX ORDER — DOCUSATE SODIUM 100 MG/1
100 CAPSULE, LIQUID FILLED ORAL DAILY
COMMUNITY

## 2023-10-03 RX ORDER — POTASSIUM CHLORIDE 7.45 MG/ML
10 INJECTION INTRAVENOUS
Status: DISCONTINUED | OUTPATIENT
Start: 2023-10-03 | End: 2023-10-03

## 2023-10-03 RX ORDER — ACETAMINOPHEN 500 MG
1000 TABLET ORAL EVERY 4 HOURS PRN
COMMUNITY

## 2023-10-03 RX ORDER — PANTOPRAZOLE SODIUM 40 MG/1
40 TABLET, DELAYED RELEASE ORAL
Status: DISCONTINUED | OUTPATIENT
Start: 2023-10-03 | End: 2023-10-06 | Stop reason: HOSPADM

## 2023-10-03 RX ORDER — TRAMADOL HYDROCHLORIDE 50 MG/1
50 TABLET ORAL EVERY 6 HOURS PRN
Status: DISCONTINUED | OUTPATIENT
Start: 2023-10-03 | End: 2023-10-06 | Stop reason: HOSPADM

## 2023-10-03 RX ADMIN — SODIUM CHLORIDE, POTASSIUM CHLORIDE, SODIUM LACTATE AND CALCIUM CHLORIDE 2000 ML: 600; 310; 30; 20 INJECTION, SOLUTION INTRAVENOUS at 10:24

## 2023-10-03 RX ADMIN — TRAMADOL HYDROCHLORIDE 50 MG: 50 TABLET ORAL at 19:30

## 2023-10-03 RX ADMIN — TRAMADOL HYDROCHLORIDE 50 MG: 50 TABLET ORAL at 12:31

## 2023-10-03 RX ADMIN — SODIUM CHLORIDE, POTASSIUM CHLORIDE, SODIUM LACTATE AND CALCIUM CHLORIDE 100 ML/HR: 600; 310; 30; 20 INJECTION, SOLUTION INTRAVENOUS at 22:29

## 2023-10-03 RX ADMIN — Medication 10 ML: at 08:15

## 2023-10-03 RX ADMIN — CALCIUM GLUCONATE 1000 MG: 20 INJECTION, SOLUTION INTRAVENOUS at 08:17

## 2023-10-03 RX ADMIN — PANTOPRAZOLE SODIUM 40 MG: 40 TABLET, DELAYED RELEASE ORAL at 10:25

## 2023-10-03 RX ADMIN — Medication 10 ML: at 20:48

## 2023-10-03 RX ADMIN — POTASSIUM CHLORIDE 40 MEQ: 10 CAPSULE, COATED, EXTENDED RELEASE ORAL at 10:25

## 2023-10-03 RX ADMIN — SENNOSIDES AND DOCUSATE SODIUM 2 TABLET: 50; 8.6 TABLET ORAL at 20:47

## 2023-10-03 RX ADMIN — SODIUM CHLORIDE, POTASSIUM CHLORIDE, SODIUM LACTATE AND CALCIUM CHLORIDE 100 ML/HR: 600; 310; 30; 20 INJECTION, SOLUTION INTRAVENOUS at 20:49

## 2023-10-03 RX ADMIN — SENNOSIDES AND DOCUSATE SODIUM 2 TABLET: 50; 8.6 TABLET ORAL at 08:17

## 2023-10-03 RX ADMIN — TRAMADOL HYDROCHLORIDE 50 MG: 50 TABLET ORAL at 05:45

## 2023-10-03 RX ADMIN — ACETAMINOPHEN 650 MG: 325 TABLET ORAL at 08:47

## 2023-10-03 RX ADMIN — SODIUM CHLORIDE, POTASSIUM CHLORIDE, SODIUM LACTATE AND CALCIUM CHLORIDE 100 ML/HR: 600; 310; 30; 20 INJECTION, SOLUTION INTRAVENOUS at 12:35

## 2023-10-03 NOTE — PROGRESS NOTES
[facility]   Urology Progress Note    Patient Name: Marnie Parra  : 1945  MRN: [id number]  Primary Care Physician:  Nicolas Velasquez MD  Date of admission: 10/2/2023    Subjective   Subjective       More comfortable, nurse states she has been able to rest; good output from nephrostomy tube    Objective   Objective     Vitals:   Temp:  [98.6 °F (37 °C)-98.7 °F (37.1 °C)] 98.7 °F (37.1 °C)  Heart Rate:  [] 97  Resp:  [10-21] 16  BP: ()/(34-77) 97/54  Physical Exam     Alert and oriented x3  No acute distress  Unlabored respirations  Nontender/nondistended  Right nephrostomy tube in place, clear       Result Review    Result Review:  I have personally reviewed the results from the time of this admission to 10/3/2023 04:18 EDT and agree with these findings:  []  Laboratory  []  Microbiology  []  Radiology  []  EKG/Telemetry   []  Cardiology/Vascular   []  Pathology  []  Old records  []  Other:      Assessment & Plan   Assessment / Plan     Brief Patient Summary:  Marnie Parra is a 78 y.o. female who  presented with right obstructive uropathy secondary to a 1 cm distal ureteral stone, history of radical cystectomy with [place] pouch status post right nephrostomy tube by interventional radiology     Active Hospital Problems:  Active Hospital Problems    Diagnosis     **Ureterolithiasis     Kidney stones, calcium oxalate dihydrate     Hypotension      Plan:   Clinically improved from urology standpoint after he nephrostomy tube placement    Maintain right nephrostomy tube  Discharge home with right nephrostomy tube    Outpatient follow-up, [facility] urology; case discussed with on-call, Dr. Burns; established patient of Dr. Flori Madrid for discharge from urology standpoint once appropriate per primary service  Please call with questions      Electronically signed by Kay Greene MD, 10/03/23, 4:18 AM EDT.

## 2023-10-03 NOTE — PLAN OF CARE
Goal Outcome Evaluation:        Plan of care reviewed with: patient and family    Progress: Ongoing, progressing    Outcome: Patient alert and oriented x4. On RA. NSR. Levophed off at 1605. 475mL out of nephrostomy tube. 2L LR bolus given. 100mL/hr LR infusing

## 2023-10-03 NOTE — ED NOTES
Was told by ED coordinator to bring Pt upstairs now after requesting medication for unstable blood pressure.

## 2023-10-03 NOTE — CONSULTS
Pulmonary / Critical Care Consult Note      Patient Name: Marnie Parra  : 1945  MRN: [id number]  Primary Care Physician:  Nicolas Velasquez MD  Referring Physician: Nicolas Velasquez MD  Date of admission: 10/2/2023    Subjective   Subjective     Reason for Consult/ Chief Complaint: Flank pain    HPI:  Marnie Parra is a 78 y.o. female with history of hypertension, hyperlipidemia, previous kidney stones, history of radical cystectomy with [place] pouch, developed right-sided flank pain and had CT scan of the abdomen done and was instructed to come to the emergency room.  CT scan showed 1 cm distal right urethral calculus resulting severe obstructive uropathy.  In the ED laboratory findings showed WBC 13.7, creatinine 0.67, sodium 135, CO2 23, urinalysis did show 3+ bacteria with TNTC WBC.  Interventional radiology was contacted and the patient underwent right nephrostomy tube.  Postprocedure, patient had decreased blood pressure and was noted to be in A-fib, tachycardic was admitted to the intensive care unit following for critical care management.  This morning on exam patient is on levo 0.08 mcg/kg/min.  Family is at bedside, she is on room air, no acute distress.    Review of Systems  Constitutional symptoms:  Denied complaints   Ear, nose, throat: Denied complaints  Cardiovascular:  Denied complaints  Respiratory: Denied complaints  Gastrointestinal: Denied complaints  Musculoskeletal: Denied complaints  Genitourinary: Denied complaints  Allergy / Immunology: Denied complaints  Hematologic: Denied complaints  Neurologic: Denied complaints  Skin: Denied complaints  Endocrine: Denied complaints  Psychiatric: Denied complaints      Personal History     Past Medical History:   Diagnosis Date    Arthritis     Cancer     GERD (gastroesophageal reflux disease)     Hyperlipidemia     Hypertension     Kidney stone     Stroke        Past Surgical History:   Procedure Laterality Date    ABDOMINAL SURGERY       APPENDECTOMY      COLONOSCOPY      CYSTECTOMY      bladder removal, with pouch made from stomach    HERNIA REPAIR      HYSTERECTOMY      KIDNEY STONE SURGERY      TUBAL ABDOMINAL LIGATION         Family History: family history is not on file. Otherwise pertinent FHx was reviewed and not pertinent to current issue.    Social History:  reports that she has been smoking cigarettes. She started smoking about 57 years ago. She has been smoking an average of 1 pack per day. She has never used smokeless tobacco. She reports that she does not drink alcohol and does not use drugs.    Home Medications:  NON FORMULARY, acetaminophen, amLODIPine, aspirin, docusate sodium, magnesium oxide, metoprolol succinate XL, multivitamin with minerals, and pantoprazole    Allergies:  Allergies   Allergen Reactions    Levofloxacin Other (See Comments)     TORE ROTATOR CUFF  Other reaction(s): Rotator cuff tear arthropathy      Propofol GI Intolerance    Gabapentin Other (See Comments) and Myalgia    Hydromorphone Hallucinations, Mental Status Change and Other (See Comments)     Dilaudid causes the patient to hallucinate.    Propranolol Unknown - Low Severity    Adhesive Tape Rash    Celecoxib Unknown - High Severity and Rash       Objective    Objective     Vitals:   Temp:  [98.1 °F (36.7 °C)-98.7 °F (37.1 °C)] 98.1 °F (36.7 °C)  Heart Rate:  [] 121  Resp:  [10-21] 16  BP: ()/(34-77) 103/57    Physical Exam:  Vital Signs Reviewed   General: Elderly WDWN, Alert, NAD.    HEENT:  PERRL, EOMI.  OP, nares clear  Neck:  Supple, no JVD, no thyromegaly  Chest:  good aeration, clear to auscultation bilaterally, tympanic to percussion bilaterally, no work of breathing noted  CV: RRR, no MGR, pulses 2+, equal.  Abd:  Soft, NT, ND, + BS, no HSM  EXT:  no clubbing, no cyanosis, no edema  Neuro:  A&Ox3, CN grossly intact, no focal deficits.  Skin: No rashes or lesions noted      Result Review    Result Review:  I have personally reviewed the  results from the time of this admission to 10/3/2023 09:42 EDT and agree with these findings:  []  Laboratory  [x]  Microbiology  []  Radiology  [x]  EKG/Telemetry   []  Cardiology/Vascular   []  Pathology  []  Old records  []  Other:  Most notable findings include:       Lab 10/02/23  2246 10/02/23  1312   WBC 5.22 13.78*   HEMOGLOBIN 11.6* 14.1   HEMATOCRIT 36.5 43.3   PLATELETS 197 328   SODIUM 140 135*   POTASSIUM 3.4* 4.1   CHLORIDE 112* 104   CO2 18.0* 23.6   BUN 18 16   CREATININE 0.76 0.67   GLUCOSE 127* 115*   CALCIUM 7.7* 9.2   TOTAL PROTEIN 4.8* 6.9   ALBUMIN 2.5* 3.9   GLOBULIN 2.3 3.0     XR Abdomen Flat & Upright    Result Date: 10/3/2023    1. Right-sided nephrostomy tube is in place and appears in appropriate position. 2. Nonspecific, nonobstructive bowel gas pattern     Robin Cabrera MD       Electronically Signed and Approved By: Robin Cabrera MD on 10/03/2023 at 13:47             CT Abdomen Pelvis Stone Protocol    Result Date: 10/2/2023     1. 1 cm distal right ureteral calculus resulting severe obstructive uropathy, status post cystectomy with cecal reservoir  2. Bronchiolitis changes in the lung bases  These results were called to the ordering clinician's office at 11:03 a.m.     RICARDO MAZA MD       Electronically Signed and Approved By: RICARDO MAZA MD on 10/02/2023 at 11:04             CT Guided Nephrostomy Tube Placement    Result Date: 10/2/2023    1. Successful CT-guided placement of 8 Haitian nephrostomy tube into the right renal collecting system with no immediate complication. 2. A sample of cloudy yellow urine was sent to the lab for culture.  A dose of Rocephin was given prior to the procedure per nursing notes.     DAWSON JAY MD       Electronically Signed and Approved By: DAWSON JAY MD on 10/02/2023 at 17:52                Assessment & Plan   Assessment / Plan     Active Hospital Problems:  Active Hospital Problems    Diagnosis     **Ureterolithiasis     Kidney  stones, calcium oxalate dihydrate     Hypotension      Impression:  Septic shock, present on admission  Obstructive uropathy  Urinary tract infection  History of radical cystectomy  History of nephrolithiasis  Hypertension  Hypocalcemia  Hypomagnesia  Hypokalemia  Transaminitis     Plan:  -Continue to monitor in ICU  -We will give additional 2 L IV fluids  -Continue Levophed to maintain MAP at 65  -Continue LR at 100  -Trend renal function, replace calcium, magnesium and potassium today  -Add Rocephin 2 g daily  -Blood and urine cultures in process  -Urology following, right nephrostomy tube in place, per their service  -Lactic acid cleared  -Hold home antihypertensives  -Resume home Protonix  -Okay for patient to have diet    DVT prophylaxis:  Mechanical DVT prophylaxis orders are present.     Code Status and Medical Interventions:   Ordered at: 10/02/23 6184     Level Of Support Discussed With:    Patient     Code Status (Patient has no pulse and is not breathing):    CPR (Attempt to Resuscitate)     Medical Interventions (Patient has pulse or is breathing):    Full Support        Patient is critically ill with septic shock. We have personally reviewed all pertinent labs, imaging, microbiology and documentation. We have discussed care with the primary service as well as at multidisciplinary critical care rounds with the bedside nurse, respiratory therapist, pharmacist and all other ancillary services.35 minutes of critical care time was spent managing this patient, excluding procedures. Of this time, I spent 20 minutes in accordance with split shared billing.    I, JUDIE Elena, spent 15 minutes critical care time in accordance to split shared billing.    Electronically signed by Jon Messina MD, 10/03/23, 3:44 PM EDT.

## 2023-10-03 NOTE — CONSULTS
"Nutrition Services    Patient Name: Marnie Parra  YOB: 1945  MRN: [id number]  Admission date: 10/2/2023      CLINICAL NUTRITION ASSESSMENT      Reason for Assessment  BMI     H&P:    Past Medical History:   Diagnosis Date    Arthritis     Cancer     GERD (gastroesophageal reflux disease)     Hyperlipidemia     Hypertension     Kidney stone     Stroke         Current Problems:   Active Hospital Problems    Diagnosis     **Ureterolithiasis     Kidney stones, calcium oxalate dihydrate     Hypotension         Nutrition/Diet History         Narrative     Nutrition assessment related to BMI/age.  Patient reports recent unintentional weight loss.  Patient states she has lost about 15 lbs over the past 4 months since her  passed away.  Was losing weight intentionally prior to that to reduce pressure on a hernia.      Patient states she did not eat for a couple of days prior to surgery.  Appetite is slowly beginning to improve.    Patient uses protein drinks/supplements at home.  Would like to add oral nutrition supplements while admitted.       Anthropometrics        Current Height, Weight Height: 172.7 cm (68\")  Weight: 59.9 kg (132 lb 0.9 oz)   Current BMI Body mass index is 20.08 kg/m².       Weight Hx  Wt Readings from Last 30 Encounters:   10/02/23 1305 59.9 kg (132 lb 0.9 oz)   09/06/23 1058 62.6 kg (138 lb 0.1 oz)   02/15/23 1709 62.6 kg (138 lb)   11/30/22 1430 60.8 kg (134 lb)   10/03/22 1737 60.8 kg (134 lb)   05/13/22 1454 66.7 kg (147 lb)   10/04/21 1312 66.7 kg (147 lb)   03/22/21 0000 68 kg (150 lb)   03/10/21 0000 68.2 kg (150 lb 4 oz)   07/03/18 0000 76.2 kg (168 lb)            Wt Change Observation Stable x 1 year  -10.2% x 2 years     Estimated/Assessed Needs       Energy Requirements 30 kcal/kg    EST Needs (kcal/day) 1797 kcal        Protein Requirements 1.0 g/kg    EST Daily Needs (g/day) 60 g       Fluid Requirements 30 ml/kg     Estimated Needs (mL/day) 1797 ml  "     Labs/Medications         Pertinent Labs Reviewed.   Results from last 7 days   Lab Units 10/02/23  2246 10/02/23  1312   SODIUM mmol/L 140 135*   POTASSIUM mmol/L 3.4* 4.1   CHLORIDE mmol/L 112* 104   CO2 mmol/L 18.0* 23.6   BUN mg/dL 18 16   CREATININE mg/dL 0.76 0.67   CALCIUM mg/dL 7.7* 9.2   BILIRUBIN mg/dL 1.1 0.3   ALK PHOS U/L 130* 95   ALT (SGPT) U/L 434* 20   AST (SGOT) U/L 888* 17   GLUCOSE mg/dL 127* 115*     Results from last 7 days   Lab Units 10/02/23  2246   HEMOGLOBIN g/dL 11.6*   HEMATOCRIT % 36.5     No results found for: COVID19  No results found for: HGBA1C      Pertinent Medications Reviewed.     Current Nutrition Orders & Evaluation of Intake       Oral Nutrition     Current PO Diet Diet: Regular/House Diet; Texture: Regular Texture (IDDSI 7); Fluid Consistency: Thin (IDDSI 0)   Supplement No active supplement orders       Malnutrition Severity Assessment                Nutrition Diagnosis         Nutrition Dx Problem 1 Inadequate energy Intake related to decreased ability to consume sufficient energy as evidenced by report of minimal PO intake.       Nutrition Intervention         Boost Plus TID   +1080 kcal, 42 g pro per day      Medical Nutrition Therapy/Nutrition Education          Learner     Readiness Patient and Family  Acceptance     Method     Response Explanation  Verbalizes understanding     Monitor/Evaluation        Monitor Per protocol, PO intake, Supplement intake, Pertinent labs, Weight       Nutrition Discharge Plan         To be determined       Electronically signed by:  Mohini Irvin RD  10/03/23 15:45 EDT

## 2023-10-03 NOTE — SIGNIFICANT NOTE
" Wound Eval / Progress Noted    MONY Lin     Patient Name: Marnie Parra  : 1945  MRN: [id number]  Today's Date: 10/3/2023                 Admit Date: 10/2/2023    Visit Dx:    ICD-10-CM ICD-9-CM   1. Obstructive uropathy  N13.9 599.60         Ureterolithiasis    Kidney stones, calcium oxalate dihydrate    Hypotension        Past Medical History:   Diagnosis Date    Arthritis     Cancer     GERD (gastroesophageal reflux disease)     Hyperlipidemia     Hypertension     Kidney stone     Stroke         Past Surgical History:   Procedure Laterality Date    ABDOMINAL SURGERY      APPENDECTOMY      COLONOSCOPY      CYSTECTOMY      bladder removal, with pouch made from stomach    HERNIA REPAIR      HYSTERECTOMY      KIDNEY STONE SURGERY      TUBAL ABDOMINAL LIGATION           Physical Assessment:       10/03/23 1215   Urostomy [place] Pouch RLQ   No Placement date: If unknown, DO NOT use \"Add Comment\" note or Placement time: If unknown, DO NOT use \"Add Comment\" note found.   Present on Admission? Select all that apply: Unknown placement date/time  Hand Hygiene Completed: Yes  Urostomy Type: In...   Stomal Appliance 2 piece;Clean;Dry;Intact   Stoma Appearance red;moist   Peristomal Assessment YADIEL     Wound Check / Follow-up:  Patient seen today for a wound consult. Family present at bedside. Patient with an 2 piece urostomy pouching system in place; [place] pouch. Patient reports that she wears a pouch due to urine leakage and an increased amount of mucous; irrigated QID. Patient with personal supplies with her and maintains pouching system herself. No needs voiced at this time.     Impression: [place] pouch    Short term goals:  regain skin integrity, skin protection, support    Tashia Oneil RN    10/3/2023    13:56 EDT   "

## 2023-10-03 NOTE — PAYOR COMM NOTE
"Rodney Parra (78 y.o. Female)       Date of Birth   1945    Social Security Number       Address   [address] [place] [address]    Home Phone   [phone]    MRN   [id number]       [facility]    Marital Status                               Admission Date   10/2/23    Admission Type   Emergency    Admitting Provider   Nicolas Velasquez MD    Attending Provider   Rodrigue Putnam MD    Department, Room/Bed   [facility] EMERGENCY ROOM,        Discharge Date       Discharge Disposition       Discharge Destination                                 Attending Provider: Rodrigue Putnam MD    Allergies: Levofloxacin, Propofol, Gabapentin, Hydromorphone, Propranolol, Adhesive Tape, Celecoxib    Isolation: None   Infection: None   Code Status: Prior    Ht: 172.7 cm (68\")   Wt: 59.9 kg (132 lb 0.9 oz)    Admission Cmt: None   Principal Problem: Ureterolithiasis [N20.1]                   Active Insurance as of 10/2/2023       Primary Coverage       Payor Plan Insurance Group Employer/Plan Group    [facility] MEDICARE REPLACEMENT [facility] MEDICARE REPLACEMENT [id number]       Payor Plan Address Payor Plan Phone Number Payor Plan Fax Number Effective Dates    [address] [address]   2021 - None Entered    [facility] [id number]         Subscriber Name Subscriber Birth Date Member ID       RODNEY PARRA 1945 [id number]                     Emergency Contacts        (Rel.) Home Phone Work Phone Mobile Phone    TAMMY PARRA (Spouse) [phone] -- --                 History & Physical        Nicolas Velasquez MD at 10/02/23 [address] [place] [address]   HISTORY AND PHYSICAL    Patient Name: Rodney Parra  : 1945  MRN: [id number]  Primary Care Physician:  Nicolas Velasquez MD  Date of admission: 10/2/2023    Subjective   Subjective     Chief Complaint:patient is complaining of very severe pain in the right side flank radiating to the neck " she has had similar pain when she had kidney stones patient with history of for cystectomy and has a ileal pouch it has got some stones CT scan was done which shows severe stenosis with the ureterohydronephrosis I discussed the case with Dr. Carreon as already discussed with radiology to remove the stones with a stent,she has a history of bladder cancer for which she has had radical cystectomy with ileal pouch and by Dr. Ruby at the [facility]    HPI: patient with right-sided renal colic with history of bladder cancer    aMrnie Parra is a 78 y.o. female history of bladder cancer with renal colic     Review of Systems   All systems were reviewed and negative except for: has been reviewed    Personal History     Past Medical History:   Diagnosis Date    Arthritis     Cancer     GERD (gastroesophageal reflux disease)     Hyperlipidemia     Hypertension     Kidney stone     Stroke        Past Surgical History:   Procedure Laterality Date    ABDOMINAL SURGERY      APPENDECTOMY      COLONOSCOPY      CYSTECTOMY      bladder removal, with pouch made from stomach    HERNIA REPAIR      HYSTERECTOMY      KIDNEY STONE SURGERY      TUBAL ABDOMINAL LIGATION         Family History: family history is not on file. Otherwise pertinent FHx was reviewed and not pertinent to current issue.    Social History:  reports that she has been smoking cigarettes. She started smoking about 57 years ago. She has been smoking an average of 1 pack per day. She has never used smokeless tobacco. She reports that she does not drink alcohol and does not use drugs.    Home Medications:  HYDROcodone-acetaminophen, (Ibuprofen 3 %, Gabapentin 10 %, Baclofen 2 %, lidocaine 4 %, Ketamine HCl 10 %), amLODIPine, aspirin, magnesium oxide, metoprolol succinate XL, pantoprazole, potassium citrate, and sennosides-docusate      Allergies:  Allergies   Allergen Reactions    Levofloxacin Other (See Comments)     TORE ROTATOR CUFF  Other  reaction(s): Rotator cuff tear arthropathy      Propofol GI Intolerance    Gabapentin Other (See Comments) and Myalgia    Hydromorphone Hallucinations, Mental Status Change and Other (See Comments)     Dilaudid causes the patient to hallucinate.    Propranolol Unknown - Low Severity    Adhesive Tape Rash    Celecoxib Unknown - High Severity and Rash       Objective   Objective     Vitals:   Temp:  [98.6 °F (37 °C)] 98.6 °F (37 °C)  Heart Rate:  [96-99] 96  Resp:  [18-21] 21  BP: (148-150)/(68-77) 150/68  Physical Exam    Constitutional: that and oriented but has acute pain the right flank to the right leg   Eyes: pupils equal reactive to light and accommodation    HENT: normal   Neck: normal   Respiratory: no rales or rhonchi   Cardiovascular: S1 and S2 normal no S3 or S4   Gastrointestinal: normal   Musculoskeletal: normal   Neurologic: no localizing signs  Skin: normal    Result Review    Result Review:  I have personally reviewed the results from the time of this admission to 10/2/2023 16:54 EDT and agree with these findings:  [x]  Laboratory  []  Microbiology  [x]  Radiology  []  EKG/Telemetry   []  Cardiology/Vascular   []  Pathology  []  Old records  []  Other:  Most notable findings include: has been reviewed    Assessment & Plan   Assessment / Plan     Brief Patient Summary:  Marnie Parra is a 78 y.o. female who she and with right-sided renal colic with ureteral stone    Active Hospital Problems:  Active Hospital Problems    Diagnosis     **Ureterolithiasis        Plan:   Urology consultation pain management IV fluid     DVT prophylaxis:  No DVT prophylaxis order currently exists.    CODE STATUS:         Admission Status:  I believe this patient meets observation status.    Electronically signed by Nicolas Velasquez MD, 10/02/23, 4:54 PM EDT.      Part of this note may be an electronic transcription/translation of spoken language to printed text using the Dragon Dictation System.       Electronically  signed by Nicolas Velasquez MD at 10/02/23 1657          Emergency Department Notes        Mare Feliciano, RN at 10/02/23 0617          Spoke to Dr. Velasquez regarding changing patient's room to PCU, since patient's vitals dropped due to increase in pain medication while completing urostomy tube placement. Bed board made aware. Dr. Velasquez to input new orders.     Electronically signed by Mare Feliciano RN at 10/02/23 1810       Mare Feliciano RN at 10/02/23 1737          On Hold for 4 mins for report.     Electronically signed by Mare Feliciano RN at 10/02/23 0637       Rodrigue Putnam MD at 10/02/23 6892          Time: 5:19 PM EDT  Date of encounter:  10/2/2023  Independent Historian/Clinical History and Information was obtained by:   Patient and Family    History is limited by: N/A    Chief Complaint: Right flank pain      History of Present Illness:  Patient is a 78 y.o. year old female who presents to the emergency department for evaluation of right flank pain.  Patient has a history of kidney stones as well as previous cystectomy due to bladder cancer.  She had an outpatient CT scan of the abdomen pelvis today that revealed a 1 cm distal right ureteral stone.  On CT report the stone does cause severe hydronephrosis with obstructive uropathy.    HPI    Patient Care Team  Primary Care Provider: Nicolas Velasquez MD    Past Medical History:     Allergies   Allergen Reactions    Levofloxacin Other (See Comments)     TORE ROTATOR CUFF  Other reaction(s): Rotator cuff tear arthropathy      Propofol GI Intolerance    Gabapentin Other (See Comments) and Myalgia    Hydromorphone Hallucinations, Mental Status Change and Other (See Comments)     Dilaudid causes the patient to hallucinate.    Propranolol Unknown - Low Severity    Adhesive Tape Rash    Celecoxib Unknown - High Severity and Rash     Past Medical History:   Diagnosis Date    Arthritis     Cancer     GERD (gastroesophageal reflux disease)      Hyperlipidemia     Hypertension     Kidney stone     Stroke      Past Surgical History:   Procedure Laterality Date    ABDOMINAL SURGERY      APPENDECTOMY      COLONOSCOPY      CYSTECTOMY      bladder removal, with pouch made from stomach    HERNIA REPAIR      HYSTERECTOMY      KIDNEY STONE SURGERY      TUBAL ABDOMINAL LIGATION       History reviewed. No pertinent family history.    Home Medications:  Prior to Admission medications    Medication Sig Start Date End Date Taking? Authorizing Provider   amLODIPine (NORVASC) 2.5 MG tablet  7/5/21   Pavel Sanon MD   aspirin 81 MG EC tablet Take 1 tablet by mouth Daily.    Pavel Sanon MD   HYDROcodone-acetaminophen (NORCO) 5-325 MG per tablet Take 1 tablet by mouth Every 6 (Six) Hours As Needed. 2/14/23   Pavel Sanon MD   Ibuprofen 3 %, Gabapentin 10 %, Baclofen 2 %, lidocaine 4 %, Ketamine HCl 10 % Apply 1-2 g topically to the appropriate area as directed 3 (Three) to 4 (Four) times daily. 9/6/23 9/5/24  Dennis Álvarez MD   MAGnesium-Oxide 400 (241.3 Mg) MG tablet tablet  7/29/21   Pavel Sanon MD   metoprolol succinate XL (TOPROL-XL) 25 MG 24 hr tablet Take 0.5 tablets by mouth Daily.    Pavel Sanon MD   pantoprazole (PROTONIX) 40 MG EC tablet  9/29/21   Pavel Sanon MD   potassium citrate (UROCIT-K) 10 MEQ (1080 MG) CR tablet  9/18/22   Pavel Sanon MD   Senna-Plus 8.6-50 MG per tablet Take 2 tablets by mouth At Night As Needed. 2/14/23   Pavel Sanon MD        Social History:   Social History     Tobacco Use    Smoking status: Every Day     Packs/day: 1.00     Types: Cigarettes     Start date: 1/5/1966    Smokeless tobacco: Never   Vaping Use    Vaping Use: Never used   Substance Use Topics    Alcohol use: Never    Drug use: Never         Review of Systems:  Review of Systems   Constitutional:  Negative for chills and fever.   HENT:  Negative for congestion, rhinorrhea and sore throat.   "  Eyes:  Negative for pain and visual disturbance.   Respiratory:  Negative for apnea, cough, chest tightness and shortness of breath.    Cardiovascular:  Negative for chest pain and palpitations.   Gastrointestinal:  Negative for abdominal pain, diarrhea, nausea and vomiting.   Genitourinary:  Positive for flank pain. Negative for difficulty urinating and dysuria.   Musculoskeletal:  Negative for joint swelling and myalgias.   Skin:  Negative for color change.   Neurological:  Negative for seizures and headaches.   Psychiatric/Behavioral: Negative.     All other systems reviewed and are negative.     Physical Exam:  BP (!) 82/36   Pulse 71 Comment: sr  Temp 98.6 °F (37 °C) (Oral)   Resp 16   Ht 172.7 cm (68\")   Wt 59.9 kg (132 lb 0.9 oz)   SpO2 96% Comment: 2l  BMI 20.08 kg/m²     Physical Exam  Vitals and nursing note reviewed.   Constitutional:       General: She is not in acute distress.     Appearance: Normal appearance. She is not toxic-appearing.   HENT:      Head: Normocephalic and atraumatic.      Jaw: There is normal jaw occlusion.   Eyes:      General: Lids are normal.      Extraocular Movements: Extraocular movements intact.      Conjunctiva/sclera: Conjunctivae normal.      Pupils: Pupils are equal, round, and reactive to light.   Cardiovascular:      Rate and Rhythm: Normal rate and regular rhythm.      Pulses: Normal pulses.      Heart sounds: Normal heart sounds.   Pulmonary:      Effort: Pulmonary effort is normal. No respiratory distress.      Breath sounds: Normal breath sounds. No wheezing or rhonchi.   Abdominal:      General: Abdomen is flat.      Palpations: Abdomen is soft.      Tenderness: There is no abdominal tenderness. There is right CVA tenderness. There is no guarding or rebound.   Musculoskeletal:         General: Normal range of motion.      Cervical back: Normal range of motion and neck supple.      Right lower leg: No edema.      Left lower leg: No edema.   Skin:     " General: Skin is warm and dry.   Neurological:      Mental Status: She is alert and oriented to person, place, and time. Mental status is at baseline.   Psychiatric:         Mood and Affect: Mood normal.                Procedures:  Procedures      Medical Decision Making:      Comorbidities that affect care:    Kidney stones, previous cystectomy    External Notes reviewed:    None      The following orders were placed and all results were independently analyzed by me:  Orders Placed This Encounter   Procedures    Blood Culture - Blood,    Blood Culture - Blood,    Urine Culture - Urine,    Urine Culture - Urine,    CT Guided Nephrostomy Tube Placement    [place] Draw    Comprehensive Metabolic Panel    Lipase    Urinalysis With Microscopic If Indicated (No Culture) - Urine, Clean Catch    Lactic Acid, Plasma    CBC Auto Differential    Urinalysis, Microscopic Only - Urine, Clean Catch    aPTT    Protime-INR    Comprehensive Metabolic Panel    Urinalysis With Culture If Indicated - Urine, Clean Catch    Urinalysis, Microscopic Only - Urine, Clean Catch    NPO Diet NPO Type: Strict NPO    Undress & Gown    Urology (on-call MD unless specified)    IP General Consult (Use specialty-specific consult if known)    Insert Peripheral IV    Initiate Observation Status    CBC & Differential    Green Top (Gel)    Lavender Top    Gold Top - SST    Light Blue Top    CBC & Differential       Medications Given in the Emergency Department:  Medications   sodium chloride 0.9 % flush 10 mL (10 mL Intravenous Given 10/2/23 9830)   Midazolam HCl (PF) (VERSED) 2 MG/2ML injection  - ADS Override Pull (has no administration in time range)   HYDROmorphone (DILAUDID) injection 0.5 mg (has no administration in time range)   dextrose 5 % and sodium chloride 0.45 % infusion (has no administration in time range)   fentaNYL citrate (PF) (SUBLIMAZE) 50 mcg/mL injection  - ADS Override Pull (has no administration in time range)   ondansetron  (ZOFRAN) 2 mg/mL injection  - ADS Override Pull (has no administration in time range)   morphine injection 4 mg (4 mg Intravenous Given 10/2/23 1454)   ondansetron (ZOFRAN) injection 4 mg (4 mg Intravenous Given 10/2/23 1459)   cefTRIAXone (ROCEPHIN) IVPB 1,000 mg (1,000 mg Intravenous New Bag 10/2/23 1645)   fentaNYL citrate (PF) (SUBLIMAZE) injection 50 mcg (50 mcg Intravenous Given 10/2/23 1623)   lidocaine (XYLOCAINE) 2% injection 20 mL (20 mL Infiltration Given 10/2/23 1700)   Midazolam HCl (PF) (VERSED) injection (1 mg Intravenous Given 10/2/23 1653)   fentaNYL citrate (PF) (SUBLIMAZE) injection (50 mcg Intravenous Given 10/2/23 1707)   ondansetron (ZOFRAN) injection (4 mg Intravenous Given 10/2/23 1710)        ED Course:         Labs:    Lab Results (last 24 hours)       Procedure Component Value Units Date/Time    CBC & Differential [[id number]]  (Abnormal) Collected: 10/02/23 1312    Specimen: Blood from Arm, Right Updated: 10/02/23 1327    Narrative:      The following orders were created for panel order CBC & Differential.  Procedure                               Abnormality         Status                     ---------                               -----------         ------                     CBC Auto Differential[[id number]]        Abnormal            Final result                 Please view results for these tests on the individual orders.    Comprehensive Metabolic Panel [[id number]]  (Abnormal) Collected: 10/02/23 1312    Specimen: Blood from Arm, Right Updated: 10/02/23 1343     Glucose 115 mg/dL      BUN 16 mg/dL      Creatinine 0.67 mg/dL      Sodium 135 mmol/L      Potassium 4.1 mmol/L      Chloride 104 mmol/L      CO2 23.6 mmol/L      Calcium 9.2 mg/dL      Total Protein 6.9 g/dL      Albumin 3.9 g/dL      ALT (SGPT) 20 U/L      AST (SGOT) 17 U/L      Alkaline Phosphatase 95 U/L      Total Bilirubin 0.3 mg/dL      Globulin 3.0 gm/dL      A/G Ratio 1.3 g/dL      BUN/Creatinine Ratio 23.9     Anion  Gap 7.4 mmol/L      eGFR 90.2 mL/min/1.73     Narrative:      GFR Normal >60  Chronic Kidney Disease <60  Kidney Failure <15    The GFR formula is only valid for adults with stable renal function between ages 18 and 70.    Lipase [[id number]]  (Normal) Collected: 10/02/23 1312    Specimen: Blood from Arm, Right Updated: 10/02/23 1343     Lipase 33 U/L     Lactic Acid, Plasma [[id number]]  (Normal) Collected: 10/02/23 1312    Specimen: Blood from Arm, Right Updated: 10/02/23 1339     Lactate 0.9 mmol/L     CBC Auto Differential [[id number]]  (Abnormal) Collected: 10/02/23 1312    Specimen: Blood from Arm, Right Updated: 10/02/23 1327     WBC 13.78 10*3/mm3      RBC 4.74 10*6/mm3      Hemoglobin 14.1 g/dL      Hematocrit 43.3 %      MCV 91.4 fL      MCH 29.7 pg      MCHC 32.6 g/dL      RDW 13.2 %      RDW-SD 44.5 fl      MPV 9.6 fL      Platelets 328 10*3/mm3      Neutrophil % 85.4 %      Lymphocyte % 6.7 %      Monocyte % 6.6 %      Eosinophil % 0.5 %      Basophil % 0.4 %      Immature Grans % 0.4 %      Neutrophils, Absolute 11.76 10*3/mm3      Lymphocytes, Absolute 0.93 10*3/mm3      Monocytes, Absolute 0.91 10*3/mm3      Eosinophils, Absolute 0.07 10*3/mm3      Basophils, Absolute 0.05 10*3/mm3      Immature Grans, Absolute 0.06 10*3/mm3      nRBC 0.0 /100 WBC     aPTT [[id number]]  (Normal) Collected: 10/02/23 1312    Specimen: Blood from Arm, Right Updated: 10/02/23 1614     PTT 26.9 seconds     Protime-INR [[id number]]  (Normal) Collected: 10/02/23 1312    Specimen: Blood from Arm, Right Updated: 10/02/23 1601     Protime 12.1 Seconds      INR 0.88    Narrative:      Suggested Therapeutic Ranges For Oral Anticoagulant Therapy:  Level of Therapy                      INR Target Range  Standard Dose                            2.0-3.0  High Dose                                2.5-3.5  Patients not receiving anticoagulant  Therapy Normal Range                     0.86-1.15    Urinalysis With Microscopic If Indicated (No  Culture) - Urine, Clean Catch [[id number]]  (Abnormal) Collected: 10/02/23 1404    Specimen: Urine, Clean Catch Updated: 10/02/23 1415     Color, UA Dark Yellow     Appearance, UA Turbid     pH, UA >=9.0     Specific Gravity, UA 1.013     Glucose, UA Negative     Ketones, UA Negative     Bilirubin, UA Negative     Blood, UA Large (3+)     Protein,  mg/dL (2+)     Leuk Esterase, UA Large (3+)     Nitrite, UA Negative     Urobilinogen, UA 1.0 E.U./dL    Urinalysis, Microscopic Only - Urine, Clean Catch [[id number]]  (Abnormal) Collected: 10/02/23 1404    Specimen: Urine, Clean Catch Updated: 10/02/23 1426     RBC, UA 21-30 /HPF      WBC, UA Too Numerous to Count /HPF      Bacteria, UA 3+ /HPF      Squamous Epithelial Cells, UA None Seen /HPF      Hyaline Casts, UA None Seen /LPF      Triple Phosphate Crystals, UA Small/1+ /HPF      Amorphous Crystals, UA Small/1+ /HPF      Mucus, UA Trace /HPF      Methodology Manual Light Microscopy    Urine Culture - Urine, Urine, Clean Catch [[id number]] Collected: 10/02/23 1404    Specimen: Urine, Clean Catch Updated: 10/02/23 1615    Blood Culture - Blood, Arm, Right [[id number]] Collected: 10/02/23 1624    Specimen: Blood from Arm, Right Updated: 10/02/23 1635    Blood Culture - Blood, Arm, Left [[id number]] Collected: 10/02/23 1624    Specimen: Blood from Arm, Left Updated: 10/02/23 1636    Urinalysis With Culture If Indicated - Nephrostomy Right [[id number]] Collected: 10/02/23 1721    Specimen: Urine from Nephrostomy Right Updated: 10/02/23 1735    Urinalysis, Microscopic Only - Nephrostomy Right [[id number]] Collected: 10/02/23 1721    Specimen: Urine from Nephrostomy Right Updated: 10/02/23 1740    Urine Culture - Urine, Nephrostomy Right [[id number]] Collected: 10/02/23 1721    Specimen: Urine from Nephrostomy Right Updated: 10/02/23 1740             Imaging:    CT Abdomen Pelvis Stone Protocol    Result Date: 10/2/2023  PROCEDURE: CT ABDOMEN PELVIS STONE PROTOCOL   COMPARISON: [facility], CT, CT ABDOMEN PELVIS W CONTRAST, 2/17/2023, 19:06.  INDICATIONS: RIGHT SIDE ABDOMEN PAIN SINCE 07:30 TODAY  TECHNIQUE: CT images were created without intravenous contrast.   PROTOCOL:   Standard imaging protocol performed    RADIATION:   DLP:356.2 mGy*cm   Automated exposure control was utilized to minimize radiation dose.  FINDINGS:  Lower chest:  Tree-in-bud and ground-glass nodules in the lung bases  Organs:  Status post cystectomy with cecal reservoir in the right lower quadrant.  1 cm calculus in the distal right ureter approximately 2 cm proximal to the cecal anastomosis, resulting in severe hydroureteronephrosis.  Additional small right calyceal stone noted.  No left-sided urolithiasis or hydronephrosis.  Liver, spleen, pancreas have an unremarkable noncontrast appearance.  Stable low-attenuation adrenal nodules compatible with adenomas  GI tract:  No acute abnormality.  Diverticula of the sigmoid colon with no associated inflammation  Pelvis:  No abnormal fluid collection.  Status post cystectomy and hysterectomy  Peritoneum/retroperitoneum:  No ascites or pneumoperitoneum.  No retroperitoneal adenopathy.  Normal caliber aorta.  Retroaortic left renal vein  Bones/soft tissues:  Status post mesh abdominal wall hernia repair.  No acute or suspicious bony abnormality.  Stable sclerotic lesion in L4 compatible with a bone island         1. 1 cm distal right ureteral calculus resulting severe obstructive uropathy, status post cystectomy with cecal reservoir  2. Bronchiolitis changes in the lung bases  These results were called to the ordering clinician's office at 11:03 a.m.     RICARDO MAZA MD       Electronically Signed and Approved By: RICARDO MAZA MD on 10/02/2023 at 11:04                Differential Diagnosis and Discussion:    Abdominal Pain: Based on the patient's signs and symptoms, I considered abdominal aortic aneurysm, small bowel obstruction, pancreatitis,  acute cholecystitis, acute appendecitis, peptic ulcer disease, gastritis, colitis, endocrine disorders, irritable bowel syndrome and other differential diagnosis an etiology of the patient's abdominal pain.    All labs were reviewed and interpreted by me.  CT scan radiology impression was interpreted by me.    MDM  Number of Diagnoses or Management Options  Obstructive uropathy  Diagnosis management comments: In summary this is a 78-year-old female patient presents to the emergency department for evaluation of flank pain.  She had an outpatient CT scan of the abdomen pelvis today that showed a large distal right ureterolithiasis with obstructive uropathy.  On review of the CT she has a 1 cm distal ureteral stone.  Urology has been consulted and is worked with IR to arrange intervention.  CBC independently reviewed by me and shows no critical abnormalities.  CMP independently reviewed by me and shows no critical abnormalities.  Urinalysis positive however patient does have conduit which may be the cause of the positivity of the urinalysis.  Patient was however given antibiotics.  Patient case has been discussed with the PCP team who will admit to the hospital for further evaluation and continuation of treatment.             Patient Care Considerations:    CT ABDOMEN AND PELVIS: I considered ordering a CT scan of the abdomen and pelvis however patient already had a CT abdomen pelvis today      Consultants/Shared Management Plan:    Consultant: I have discussed the case with Dr. Greene who agrees to consult on the patient.  PCP: I have discussed the case with Dr. Velasquez who agrees to accept the patient for admission.    Social Determinants of Health:    Patient is independent, reliable, and has access to care.       Disposition and Care Coordination:    Admit:   Through independent evaluation of the patient's history, physical, and imperical data, the patient meets criteria for observation/admission to the  [facility].        Final diagnoses:   Obstructive uropathy        ED Disposition       ED Disposition   Decision to Admit    Condition   --    Comment   Level of Care: Med/Surg [1]   Diagnosis: Ureterolithiasis [152356]   Admitting Physician: JACKIE COREA [[id number]]   Attending Physician: JACKIE COREA [[id number]]                 This medical record created using voice recognition software.             Rodrigue Putnam MD  10/02/23 1741      Electronically signed by Rodrigue Putnam MD at 10/02/23 1741       Mare Feliciano, RN at 10/02/23 1353          Completed a CT this am, was phoned and told to come straight to the ED.     Electronically signed by Mare Feliciano RN at 10/02/23 4061       Vital Signs (last day)       Date/Time Temp Temp src Pulse Resp BP Patient Position SpO2    10/02/23 2031 -- -- 96 -- 87/41 -- --    10/02/23 2001 -- -- 94 -- 65/38 -- --    10/02/23 1942 -- -- 93 -- -- -- --    10/02/23 1931 -- -- 91 -- 84/35 -- --    10/02/23 1912 -- -- 101 -- -- -- --    10/02/23 1901 -- -- 93 -- 101/71 -- --    10/02/23 1842 -- -- 92 -- 112/38 -- --    10/02/23 1831 -- -- 90 -- -- -- --    10/02/23 1830 -- -- 89 -- 108/47 -- --    10/02/23 1812 -- -- 91 -- -- -- --    10/02/23 1801 -- -- 86 -- 120/56 -- --    10/02/23 1742 -- -- 73 -- -- -- --    10/02/23 17:34:23 -- -- 71 16 82/36 -- 96    10/02/23 1731 -- -- 67 -- 68/37 -- --    10/02/23 17:26:33 -- -- 67 13 96/46 -- 100    10/02/23 17:23:01 -- -- 66 13 95/45 -- 99    10/02/23 17:16:12 -- -- 67 10 98/48 -- 99    10/02/23 1712 -- -- 66 -- 112/54 -- --    10/02/23 17:11:37 -- -- 63 12 112/54 -- 99    10/02/23 17:06:14 -- -- 91 16 123/76 -- 100    10/02/23 1701 -- -- 87 -- 137/67 -- --    10/02/23 16:52:57 -- -- 96 21 150/68 -- 99    10/02/23 1642 -- -- 85 -- -- -- --    10/02/23 1631 -- -- 81 -- -- -- --    10/02/23 1612 -- -- 74 -- -- -- --    10/02/23 1542 -- -- 80 -- -- -- --    10/02/23 1305 98.6 (37) Oral 99 18 148/77 Sitting 97           Facility-Administered Medications as of 10/2/2023   Medication Dose Route Frequency Provider Last Rate Last Admin    [COMPLETED] cefTRIAXone (ROCEPHIN) IVPB 1,000 mg  1,000 mg Intravenous Once Rodrigue Putnam MD   Stopped at 10/02/23 1757    dextrose 5 % and sodium chloride 0.45 % infusion  100 mL/hr Intravenous Continuous Nicolas Velasquez MD        fentaNYL citrate (PF) (SUBLIMAZE) 50 mcg/mL injection  - ADS Override Pull             [COMPLETED] fentaNYL citrate (PF) (SUBLIMAZE) injection 50 mcg  50 mcg Intravenous Once Rodrigue Putnam MD   50 mcg at 10/02/23 1623    [COMPLETED] fentaNYL citrate (PF) (SUBLIMAZE) injection   Intravenous PRN Nathan Rayo MD   50 mcg at 10/02/23 1707    HYDROmorphone (DILAUDID) injection 0.5 mg  0.5 mg Intravenous Q2H PRN Nicolas Velasquez MD        [COMPLETED] lidocaine (XYLOCAINE) 2% injection 20 mL  20 mL Infiltration Once Rodrigue Putnam MD   20 mL at 10/02/23 1700    Midazolam HCl (PF) (VERSED) 2 MG/2ML injection  - ADS Override Pull             [COMPLETED] Midazolam HCl (PF) (VERSED) injection   Intravenous PRN Nathan Rayo MD   1 mg at 10/02/23 1653    [COMPLETED] morphine injection 4 mg  4 mg Intravenous Once Rodrigue Putnam MD   4 mg at 10/02/23 1454    ondansetron (ZOFRAN) 2 mg/mL injection  - ADS Override Pull             [COMPLETED] ondansetron (ZOFRAN) injection 4 mg  4 mg Intravenous Once Rodrigue Putnam MD   4 mg at 10/02/23 1459    [COMPLETED] ondansetron (ZOFRAN) injection   Intravenous PRN Nathan Rayo MD   4 mg at 10/02/23 1710    sodium chloride 0.9 % bolus 1,000 mL  1,000 mL Intravenous Once Fawad Alfaro MD        sodium chloride 0.9 % flush 10 mL  10 mL Intravenous PRN Rodrigue Putnam MD   10 mL at 10/02/23 1459    sodium chloride 0.9 % infusion  125 mL/hr Intravenous Once Nicolas Velasquez MD         Orders (active)        Start     Ordered    10/03/23 0600  CBC & Differential  Morning Draw         10/02/23  1653    10/03/23 0600  Comprehensive Metabolic Panel  Morning Draw         10/02/23 1653    10/02/23 2030  sodium chloride 0.9 % bolus 1,000 mL  Once         10/02/23 2003    10/02/23 2030  sodium chloride 0.9 % infusion  Once         10/02/23 2008    10/02/23 2010  XR Abdomen Flat & Upright  1 Time Imaging         10/02/23 2009    10/02/23 2006  Inpatient Intensivist Consult  Once        Specialty:  Intensive Care  Provider:  (Not yet assigned)    10/02/23 2006    10/02/23 2005  Inpatient Admission  Once         10/02/23 2005    10/02/23 1833  Cardiac Monitoring  Continuous        Comments: Follow Standing Orders As Outlined in Process Instructions (Open Order Report to View Full Instructions)    10/02/23 1833    10/02/23 1741  Urine Culture - Urine, Nephrostomy Right  Once         10/02/23 1740    10/02/23 1715  dextrose 5 % and sodium chloride 0.45 % infusion  Continuous         10/02/23 1658    10/02/23 1708  ondansetron (ZOFRAN) 2 mg/mL injection  - ADS Override Pull        Note to Pharmacy: Created by [employer] override    10/02/23 1708    10/02/23 1705  fentaNYL citrate (PF) (SUBLIMAZE) 50 mcg/mL injection  - ADS Override Pull        Note to Pharmacy: Created by cabinet override    10/02/23 1705    10/02/23 1653  HYDROmorphone (DILAUDID) injection 0.5 mg  Every 2 Hours PRN         10/02/23 1653    10/02/23 1642  Midazolam HCl (PF) (VERSED) 2 MG/2ML injection  - ADS Override Pull        Note to Pharmacy: Created by [employer] override    10/02/23 1642    10/02/23 1616  Urine Culture - Urine, Urine, Clean Catch  Once         10/02/23 1615    10/02/23 1612  Blood Culture - Blood, Arm, Right  Once        See Hyperspace for full Linked Orders Report.    10/02/23 1611    10/02/23 1612  Blood Culture - Blood, Arm, Left  Once        See Hyperspace for full Linked Orders Report.    10/02/23 1611    10/02/23 1601  IP General Consult (Use specialty-specific consult if known)  Once        Provider:  Nicolas Velasquez MD     10/02/23 1600    10/02/23 1309  NPO Diet NPO Type: Strict NPO  Diet Effective Now         10/02/23 1308    10/02/23 1309  Insert Peripheral IV  Once         10/02/23 1308    10/02/23 1308  sodium chloride 0.9 % flush 10 mL  As Needed         10/02/23 1308    Signed and Held  Obtain Informed Consent  Once         Signed and Held    Signed and Held  No Lab Testing Needed  Once         Signed and Held    Signed and Held  Vital Signs  Every 4 Hours       Signed and Held    Signed and Held  Activity - Ad Ni  Until Discontinued         Signed and Held    Signed and Held  Intake & Output  Every Shift       Signed and Held    Signed and Held  Weigh Patient  Once         Signed and Held    Signed and Held  Oral Care  2 Times Daily       Signed and Held    Signed and Held  CBC (No Diff)  Morning Draw         Signed and Held    Signed and Held  Comprehensive Metabolic Panel  Morning Draw         Signed and Held    Signed and Held  Protime-INR  Morning Draw         Signed and Held    Signed and Held  CBC (No Diff)  STAT         Signed and Held    Signed and Held  Comprehensive Metabolic Panel  STAT         Signed and Held    Signed and Held  Insert Peripheral IV  Once         Signed and Held    Signed and Held  Saline Lock & Maintain IV Access  Continuous         Signed and Held    Signed and Held  sodium chloride 0.9 % flush 10 mL  Every 12 Hours Scheduled         Signed and Held    Signed and Held  sodium chloride 0.9 % flush 10 mL  As Needed         Signed and Held    Signed and Held  sodium chloride 0.9 % infusion 40 mL  As Needed         Signed and Held    Signed and Held  acetaminophen (TYLENOL) tablet 650 mg  Every 4 Hours PRN         Signed and Held    Signed and Held  aluminum-magnesium hydroxide-simethicone (MAALOX MAX) 400-400-40 MG/5ML suspension 15 mL  Every 6 Hours PRN         Signed and Held    Signed and Held  sennosides-docusate (PERICOLACE) 8.6-50 MG per tablet 2 tablet  2 Times Daily        See Hyperspace  for full Linked Orders Report.    Signed and Held    Signed and Held  polyethylene glycol (MIRALAX) packet 17 g  Daily PRN        See Hyperspace for full Linked Orders Report.    Signed and Held    Signed and Held  bisacodyl (DULCOLAX) EC tablet 5 mg  Daily PRN        See Hyperspace for full Linked Orders Report.    Signed and Held    Signed and Held  bisacodyl (DULCOLAX) suppository 10 mg  Daily PRN        See Hyperspace for full Linked Orders Report.    Signed and Held    Signed and Held  ondansetron (ZOFRAN) injection 4 mg  Every 6 Hours PRN         Signed and Held    Signed and Held  Place Sequential Compression Device  Once         Signed and Held    Signed and Held  Maintain Sequential Compression Device  Continuous         Signed and Held    Signed and Held  Code Status and Medical Interventions:  Continuous         Signed and Held    Signed and Held  NPO Diet NPO Type: Strict NPO  Diet Effective Now         Signed and Held    Signed and Held  Inpatient Urology Consult  Once        Specialty:  Urology  Provider:  Kay Greene MD    Signed and Held    Signed and Held  Vital Signs (Specify Frequency)  Per Order Details         Signed and Held    Signed and Held  Assess Puncture Site & Watch for Signs of Pain & Bleeding  Per Order Details        Comments: Decreased Blood Pressure, Increased Heart Rate, Lightheadedness, Increased Swelling Around Puncture Site    Signed and Held                     Consult Notes (last 24 hours)        Kay Greene MD at 10/02/23 1828        Consult Orders    1. Urology (on-call MD unless specified) [[id number]] ordered by Rodrigue Putnam MD at 10/02/23 1429                   [facility]   UROLOGY Consult Note    Patient Name: Marnie Parra  : 1945  MRN: [id number]  Primary Care Physician:  Nicolas Velasquez MD  Referring Physician: No ref. provider found  Date of admission: 10/2/2023    Subjective   Subjective     Reason for Consult/ Chief Complaint: Right  flank pain    HPI:  Marnie Parra is a 78 y.o. female presented to ER with complaint of right-sided flank pain.  History significant for bladder cancer, status post radical cystectomy with [place] pouch by Dr. Ruby.  Also with history of nephrolithiasis.  Patient complained of right flank pain to PCP, Dr. Cole to obtain CT scan which indicated obstructive uropathy on the right secondary to a 1 cm distal ureteral stone with severe associated hydronephrosis.  She was then instructed to present to the ER.    IR facilitated nephrostomy tube placement.  At time of consultation, patient has returned from IR with nephrostomy tube in place.  Continues to complain of significant right-sided pain.  Also discomfort because she needs to catheterize her [place] pouch.    Further work-up in ER reveals WBC 13.78; creatinine 0.67; UA consistent with patient of [place] pouch, culture pending.  Urine culture also pending from nephrostomy tube placement.  Blood cultures pending.    Urology consulted for evaluation.      Review of Systems   All systems were reviewed and negative except for: ER H&P in the above    Personal History     Past Medical History:   Diagnosis Date    Arthritis     Cancer     GERD (gastroesophageal reflux disease)     Hyperlipidemia     Hypertension     Kidney stone     Stroke        Past Surgical History:   Procedure Laterality Date    ABDOMINAL SURGERY      APPENDECTOMY      COLONOSCOPY      CYSTECTOMY      bladder removal, with pouch made from stomach    HERNIA REPAIR      HYSTERECTOMY      KIDNEY STONE SURGERY      TUBAL ABDOMINAL LIGATION         Family History: family history is not on file. Otherwise pertinent FHx was reviewed and not pertinent to current issue.    Social History:  reports that she has been smoking cigarettes. She started smoking about 57 years ago. She has been smoking an average of 1 pack per day. She has never used smokeless tobacco. She reports that she does not drink alcohol  and does not use drugs.    Home Medications:  HYDROcodone-acetaminophen, (Ibuprofen 3 %, Gabapentin 10 %, Baclofen 2 %, lidocaine 4 %, Ketamine HCl 10 %), amLODIPine, aspirin, magnesium oxide, metoprolol succinate XL, pantoprazole, potassium citrate, and sennosides-docusate    Allergies:  Allergies   Allergen Reactions    Levofloxacin Other (See Comments)     TORE ROTATOR CUFF  Other reaction(s): Rotator cuff tear arthropathy      Propofol GI Intolerance    Gabapentin Other (See Comments) and Myalgia    Hydromorphone Hallucinations, Mental Status Change and Other (See Comments)     Dilaudid causes the patient to hallucinate.    Propranolol Unknown - Low Severity    Adhesive Tape Rash    Celecoxib Unknown - High Severity and Rash       Objective    Objective     Vitals:   Temp:  [98.6 °F (37 °C)] 98.6 °F (37 °C)  Heart Rate:  [63-99] 71  Resp:  [10-21] 16  BP: ()/(36-77) 82/36    Physical Exam:   Appears uncomfortable, thin   lungs: Clear, unlabored  Awake alert and oriented  Mood normal; affect normal  Right nephrostomy tube in place, clear  Pain [place] pouch stoma    Result Review    Result Review:  I have personally reviewed the results from the time of this admission to 10/2/2023 18:28 EDT and agree with these findings:  [x]  Laboratory  [x]  Microbiology  [x]  Radiology  []  EKG/Telemetry   []  Cardiology/Vascular   []  Pathology  []  Old records  []  Other:      Assessment & Plan   Assessment / Plan     Brief Patient Summary:  Marnie Parra is a 78 y.o. female who presented with right obstructive uropathy secondary to a 1 cm distal ureteral stone, history of radical cystectomy with [place] pouch status post right nephrostomy tube by interventional radiology    Active Hospital Problems:  Active Hospital Problems    Diagnosis     **Ureterolithiasis        Plan:   Chart reviewed    Had discussion with [facility] urology. Dr. Burns regarding patient's status.  Would not warrant tertiary  transfer at this time.    Patient to maintain right nephrostomy tube  Okay for discharge once medically ready    Will warrant outpatient follow-up with [facility] urology, Dr. Ruby for discussion of definitive stone management.    Do not anticipate acute urologic intervention  Discussed with primary service      Electronically signed by Kay Greene MD, 10/02/23, 6:28 PM EDT.      Electronically signed by Kay Greene MD at 10/02/23 [id number]

## 2023-10-04 LAB
ALBUMIN SERPL-MCNC: 2.5 G/DL (ref 3.5–5.2)
ALBUMIN/GLOB SERPL: 1 G/DL
ALP SERPL-CCNC: 118 U/L (ref 39–117)
ALT SERPL W P-5'-P-CCNC: 298 U/L (ref 1–33)
ANION GAP SERPL CALCULATED.3IONS-SCNC: 5.9 MMOL/L (ref 5–15)
AST SERPL-CCNC: 152 U/L (ref 1–32)
BILIRUB SERPL-MCNC: 0.2 MG/DL (ref 0–1.2)
BUN SERPL-MCNC: 22 MG/DL (ref 8–23)
BUN/CREAT SERPL: 31.4 (ref 7–25)
CALCIUM SPEC-SCNC: 8.5 MG/DL (ref 8.6–10.5)
CHLORIDE SERPL-SCNC: 111 MMOL/L (ref 98–107)
CO2 SERPL-SCNC: 22.1 MMOL/L (ref 22–29)
CREAT SERPL-MCNC: 0.7 MG/DL (ref 0.57–1)
DEPRECATED RDW RBC AUTO: 46.6 FL (ref 37–54)
EGFRCR SERPLBLD CKD-EPI 2021: 89.2 ML/MIN/1.73
ERYTHROCYTE [DISTWIDTH] IN BLOOD BY AUTOMATED COUNT: 13.7 % (ref 12.3–15.4)
GLOBULIN UR ELPH-MCNC: 2.4 GM/DL
GLUCOSE SERPL-MCNC: 84 MG/DL (ref 65–99)
HCT VFR BLD AUTO: 32.8 % (ref 34–46.6)
HGB BLD-MCNC: 10.6 G/DL (ref 12–15.9)
MAGNESIUM SERPL-MCNC: 1.9 MG/DL (ref 1.6–2.4)
MCH RBC QN AUTO: 30 PG (ref 26.6–33)
MCHC RBC AUTO-ENTMCNC: 32.3 G/DL (ref 31.5–35.7)
MCV RBC AUTO: 92.9 FL (ref 79–97)
PHOSPHATE SERPL-MCNC: 2.5 MG/DL (ref 2.5–4.5)
PLATELET # BLD AUTO: 182 10*3/MM3 (ref 140–450)
PMV BLD AUTO: 10.4 FL (ref 6–12)
POTASSIUM SERPL-SCNC: 4.3 MMOL/L (ref 3.5–5.2)
PROT SERPL-MCNC: 4.9 G/DL (ref 6–8.5)
RBC # BLD AUTO: 3.53 10*6/MM3 (ref 3.77–5.28)
SODIUM SERPL-SCNC: 139 MMOL/L (ref 136–145)
WBC NRBC COR # BLD: 30.26 10*3/MM3 (ref 3.4–10.8)

## 2023-10-04 PROCEDURE — 25810000003 LACTATED RINGERS PER 1000 ML: Performed by: INTERNAL MEDICINE

## 2023-10-04 PROCEDURE — 25010000002 MAGNESIUM SULFATE IN D5W 1G/100ML (PREMIX) 1-5 GM/100ML-% SOLUTION: Performed by: PHYSICIAN ASSISTANT

## 2023-10-04 PROCEDURE — 80053 COMPREHEN METABOLIC PANEL: CPT | Performed by: INTERNAL MEDICINE

## 2023-10-04 PROCEDURE — 25010000002 CEFTRIAXONE PER 250 MG: Performed by: NURSE PRACTITIONER

## 2023-10-04 PROCEDURE — 85027 COMPLETE CBC AUTOMATED: CPT | Performed by: INTERNAL MEDICINE

## 2023-10-04 PROCEDURE — 83735 ASSAY OF MAGNESIUM: CPT | Performed by: INTERNAL MEDICINE

## 2023-10-04 PROCEDURE — 84100 ASSAY OF PHOSPHORUS: CPT | Performed by: PHYSICIAN ASSISTANT

## 2023-10-04 RX ORDER — HYDROCODONE BITARTRATE AND ACETAMINOPHEN 5; 325 MG/1; MG/1
1 TABLET ORAL EVERY 4 HOURS PRN
Status: DISCONTINUED | OUTPATIENT
Start: 2023-10-04 | End: 2023-10-06 | Stop reason: HOSPADM

## 2023-10-04 RX ORDER — MIDODRINE HYDROCHLORIDE 5 MG/1
5 TABLET ORAL
Status: DISCONTINUED | OUTPATIENT
Start: 2023-10-04 | End: 2023-10-06 | Stop reason: HOSPADM

## 2023-10-04 RX ORDER — MAGNESIUM SULFATE 1 G/100ML
1 INJECTION INTRAVENOUS ONCE
Status: COMPLETED | OUTPATIENT
Start: 2023-10-04 | End: 2023-10-04

## 2023-10-04 RX ADMIN — Medication 10 ML: at 08:07

## 2023-10-04 RX ADMIN — TRAMADOL HYDROCHLORIDE 50 MG: 50 TABLET ORAL at 09:48

## 2023-10-04 RX ADMIN — HYDROCODONE BITARTRATE AND ACETAMINOPHEN 1 TABLET: 5; 325 TABLET ORAL at 21:53

## 2023-10-04 RX ADMIN — TRAMADOL HYDROCHLORIDE 50 MG: 50 TABLET ORAL at 03:41

## 2023-10-04 RX ADMIN — SENNOSIDES AND DOCUSATE SODIUM 2 TABLET: 50; 8.6 TABLET ORAL at 08:07

## 2023-10-04 RX ADMIN — MIDODRINE HYDROCHLORIDE 5 MG: 5 TABLET ORAL at 11:26

## 2023-10-04 RX ADMIN — HYDROCODONE BITARTRATE AND ACETAMINOPHEN 1 TABLET: 5; 325 TABLET ORAL at 13:31

## 2023-10-04 RX ADMIN — SODIUM CHLORIDE, POTASSIUM CHLORIDE, SODIUM LACTATE AND CALCIUM CHLORIDE 100 ML/HR: 600; 310; 30; 20 INJECTION, SOLUTION INTRAVENOUS at 08:13

## 2023-10-04 RX ADMIN — TRAMADOL HYDROCHLORIDE 50 MG: 50 TABLET ORAL at 16:12

## 2023-10-04 RX ADMIN — CEFTRIAXONE SODIUM 2000 MG: 2 INJECTION, POWDER, FOR SOLUTION INTRAMUSCULAR; INTRAVENOUS at 01:23

## 2023-10-04 RX ADMIN — SENNOSIDES AND DOCUSATE SODIUM 2 TABLET: 50; 8.6 TABLET ORAL at 21:53

## 2023-10-04 RX ADMIN — PANTOPRAZOLE SODIUM 40 MG: 40 TABLET, DELAYED RELEASE ORAL at 06:57

## 2023-10-04 RX ADMIN — MAGNESIUM SULFATE 1 G: 1 INJECTION INTRAVENOUS at 11:27

## 2023-10-04 RX ADMIN — Medication 10 ML: at 21:53

## 2023-10-04 RX ADMIN — MIDODRINE HYDROCHLORIDE 5 MG: 5 TABLET ORAL at 16:46

## 2023-10-04 RX ADMIN — SODIUM CHLORIDE, POTASSIUM CHLORIDE, SODIUM LACTATE AND CALCIUM CHLORIDE 100 ML/HR: 600; 310; 30; 20 INJECTION, SOLUTION INTRAVENOUS at 16:47

## 2023-10-04 NOTE — PROGRESS NOTES
Pulmonary / Critical Care Progress Note      Patient Name: Marnie Parra  : 1945  MRN: [id number]  Attending:  Nicolas Velasquez MD   Date of admission: 10/2/2023    Subjective   Subjective   Patient critically ill with flank pain    Over past 24 hours:  Received 2 L IV fluid bolus, followed by maintenance fluids at 100  Continued on Levophed to maintain MAP 65 or greater  On Rocephin 2 g IV daily  Blood culture and urine cultures in process  Urology on board, right nephrostomy tube in place    Today  In bed with HOB elevated  Family at bedside  Continues with pain, is getting as needed pain medication.  Tramadol seems to help  Continues to require Levophed due to soft SBP and MAP  Nephrostomy tube output 600 over the past 24 hours  Urostomy [place] pouch with 125 over the past 24 hours    Review of Systems  Constitutional symptoms: Weakness, fatigue, pain requiring as needed pain medication; otherwise denied complaints   Ear, nose, throat: Denied complaints  Cardiovascular:  Denied complaints  Respiratory: Denied complaints  Gastrointestinal: Denied complaints  Musculoskeletal: Denied complaints  Neurologic: Denied complaints  Skin: Denied complaints        Objective   Objective     Vitals:   Vital signs for last 24 hours:  Temp:  [97.9 °F (36.6 °C)-99 °F (37.2 °C)] 99 °F (37.2 °C)  Heart Rate:  [] 85  Resp:  [14-20] 18  BP: ()/(36-74) 108/56    Intake/Output last 3 shifts:  I/O last 3 completed shifts:  In: 4340 [P.O.:320; I.V.:3970; IV Piggyback:50]  Out: 800 [Urine:800]  Intake/Output this shift:  I/O this shift:  In: 313 [I.V.:313]  Out: 225 [Urine:225]    Vent settings for last 24 hours:       Hemodynamic parameters for last 24 hours:       Physical Exam   Vital Signs Reviewed   General:  WDWN, Alert, NAD.    HEENT:  PERRL, EOMI.  OP, nares clear  Chest:  good aeration, clear to auscultation bilaterally, equal rise and fall of chest, no work of breathing noted at rest  CV: RRR, no MGR,  pulses 2+, equal.  Abd:  Soft, NT, ND, + BS, no HSM  EXT:  no clubbing, no cyanosis, no edema  Neuro:  A&Ox3, CN grossly intact, no focal deficits.  Skin: No rashes or lesions noted        Result Review    Result Review:  I have personally reviewed the results from the time of this admission to 10/4/2023 06:51 EDT and agree with these findings:  []  Laboratory  []  Microbiology  []  Radiology  []  EKG/Telemetry   []  Cardiology/Vascular   []  Pathology  []  Old records  []  Other:  Most notable findings include:       Lab 10/04/23  0255 10/02/23  2246 10/02/23  1312   WBC 30.26* 5.22 13.78*   HEMOGLOBIN 10.6* 11.6* 14.1   HEMATOCRIT 32.8* 36.5 43.3   PLATELETS 182 197 328   SODIUM 139 140 135*   POTASSIUM 4.3 3.4* 4.1   CHLORIDE 111* 112* 104   CO2 22.1 18.0* 23.6   BUN 22 18 16   CREATININE 0.70 0.76 0.67   GLUCOSE 84 127* 115*   CALCIUM 8.5* 7.7* 9.2   PHOSPHORUS 2.5  --   --    TOTAL PROTEIN 4.9* 4.8* 6.9   ALBUMIN 2.5* 2.5* 3.9   GLOBULIN 2.4 2.3 3.0     urine culture with gram-negative bacilli  Blood cultures x2 NGTD    Assessment & Plan   Assessment / Plan     Active Hospital Problems:  Active Hospital Problems    Diagnosis     **Ureterolithiasis     Kidney stones, calcium oxalate dihydrate     Hypotension          Impression:  Septic shock, present on admission  Obstructive uropathy  UTI  History of radical cystectomy  Nephrolithiasis  Hypertension  Hypomagnesemia, 1.9  Hypocalcemia, improved  Hypokalemia, improved  Transaminitis     Plan:  Continue Levophed to maintain MAP 65 or greater.    Add midodrine 5 mg 3 times daily  Continue LR IVF at 100  Urology on board.  Right nephrostomy tube in place, urostomy [place] pouch in place.  Appreciate their assistance  Continue Rocephin to complete therapy.  Urine culture with 100,000 gram-negative bacilli (previous cultures with E. coli and Klebsiella o.)  Trend renal function electrolytes.  Replace as needed  Regular diet  Up in chair       DVT  prophylaxis:  Mechanical DVT prophylaxis orders are present.    CODE STATUS:   Level Of Support Discussed With: Patient  Code Status (Patient has no pulse and is not breathing): CPR (Attempt to Resuscitate)  Medical Interventions (Patient has pulse or is breathing): Full Support      Patient is critically ill with septic shock, present on admission, obstructive uropathy, UTI, history of radical cystectomy, nephrolithiasis, hypertension, electrolyte disturbances, transaminitis. We have personally reviewed all pertinent labs, imaging, microbiology and documentation. We have discussed care with the primary service as well as at multidisciplinary critical care rounds with the bedside nurse, respiratory therapist, pharmacist and all other ancillary services.33 minutes of critical care time was spent managing this patient, excluding procedures. Of this time, I spent 21 minutes in accordance with split shared billing.    I, ASHWIN Valladares spent 12 minutes in accordance with split shared billing.    Electronically signed by Jon Messina MD, 10/04/23, 5:24 PM EDT.

## 2023-10-04 NOTE — ACP (ADVANCE CARE PLANNING)
Patient desiring completion of ACP. Son designated as decision maker. Patient choosing to be DNR/DNI. EMS DNR also completed. Copies given to patient as well as original. Copy of each placed in medical records.

## 2023-10-04 NOTE — PROGRESS NOTES
[facility]     Progress Note    Patient Name: Marnie Parra  : 1945  MRN: [id number]  Primary Care Physician:  Nicolas Velasquez MD  Date of admission: 10/2/2023    Subjective   Subjective     Chief Complaint: Patient has had leukocytosis repeat test is being done to make sure there is not a lab error , she did require Levophed last night because of hypotension.  I discussed her case with her urologist Dr. Ruby at the [facility] soon as patient is stabilized she will go back to get the stones removed, patient otherwise stable does not appear to be in acute distress we will continue current management she is already on antibiotics urine cultures are still pending    HPI: Urine cultures are pending she has had leukocytosis and repeat CBC being done    Review of Systems   All systems were reviewed and negative except for: Reviewed    Objective   Objective     Vitals:   Temp:  [97.9 °F (36.6 °C)-99.2 °F (37.3 °C)] 99.2 °F (37.3 °C)  Heart Rate:  [] 85  Resp:  [14-20] 18  BP: ()/(36-74) 108/56    Physical Exam    Constitutional: Awake, alert   Eyes: PERRLA, sclerae anicteric, no conjunctival injection   HENT: NCAT, mucous membranes moist   Neck: Supple, no thyromegaly, no lymphadenopathy, trachea midline   Respiratory: Clear to auscultation bilaterally, nonlabored respirations    Cardiovascular: RRR, no murmurs, rubs, or gallops, palpable pedal pulses bilaterally   Gastrointestinal: Positive bowel sounds, soft, nontender, nondistended   Musculoskeletal: No bilateral ankle edema, no clubbing or cyanosis to extremities   Psychiatric: Appropriate affect, cooperative   Neurologic: Oriented x 3, strength symmetric in all extremities, Cranial Nerves grossly intact to confrontation, speech clear   Skin: No rashes   Has been reviewed  Result Review    Result Review:  I have personally reviewed the results from the time of this admission to 10/4/2023 08:05 EDT and agree with these  findings:  [x]  Laboratory leukocytosis  []  Microbiology  []  Radiology  []  EKG/Telemetry   []  Cardiology/Vascular   []  Pathology  []  Old records  []  Other:  Most notable findings include: Leukocytosis    Assessment & Plan   Assessment / Plan     Brief Patient Summary:  Marnie Parra is a 78 y.o. female who patient with stable and doing better but has had episodes of hypotension    Active Hospital Problems:  Active Hospital Problems    Diagnosis     **Ureterolithiasis     Kidney stones, calcium oxalate dihydrate     Hypotension        Plan:   Continue antibiotics    DVT prophylaxis:  Mechanical DVT prophylaxis orders are present.    CODE STATUS:   Level Of Support Discussed With: Patient  Code Status (Patient has no pulse and is not breathing): CPR (Attempt to Resuscitate)  Medical Interventions (Patient has pulse or is breathing): Full Support    Disposition:  I expect patient to be discharged after the patient has been stabilized.    Electronically signed by Nicolas Velasquez MD, 10/04/23, 8:05 AM EDT.      Part of this note may be an electronic transcription/translation of spoken language to printed text using the Dragon Dictation System.

## 2023-10-04 NOTE — PLAN OF CARE
Goal Outcome Evaluation:        Plan of care reviewed with: Patient and family    Progress: Ongoing, progressing    Outcome: Patient is alert and oriented x4. She got up to the chair with minimal assist twice. Levophed was turned off at 1215. Pain better managed with addition of PRN meds, see MAR. Nephrostomy and urostomy draining well.

## 2023-10-04 NOTE — PLAN OF CARE
Goal Outcome Evaluation:  Plan of Care Reviewed With: patient        Progress: no change  Outcome Evaluation: Patient did well with pain control. Tramadol was effective for her. She had a crying spell with her grief of losing of her . Levo had to be turned back on d/t soft BP and MAP. She had a good amount out of urostomy and some from nephrostomy. VSS other than BP. WBC went from 5.22 to 30.26.    Suri Sheets RN

## 2023-10-05 LAB
ALBUMIN SERPL-MCNC: 2.1 G/DL (ref 3.5–5.2)
ALBUMIN/GLOB SERPL: 0.8 G/DL
ALP SERPL-CCNC: 120 U/L (ref 39–117)
ALT SERPL W P-5'-P-CCNC: 180 U/L (ref 1–33)
ANION GAP SERPL CALCULATED.3IONS-SCNC: 6.3 MMOL/L (ref 5–15)
AST SERPL-CCNC: 54 U/L (ref 1–32)
BILIRUB SERPL-MCNC: 0.2 MG/DL (ref 0–1.2)
BUN SERPL-MCNC: 18 MG/DL (ref 8–23)
BUN/CREAT SERPL: 36.7 (ref 7–25)
CALCIUM SPEC-SCNC: 8.4 MG/DL (ref 8.6–10.5)
CHLORIDE SERPL-SCNC: 112 MMOL/L (ref 98–107)
CO2 SERPL-SCNC: 22.7 MMOL/L (ref 22–29)
CREAT SERPL-MCNC: 0.49 MG/DL (ref 0.57–1)
DEPRECATED RDW RBC AUTO: 45.9 FL (ref 37–54)
EGFRCR SERPLBLD CKD-EPI 2021: 97.2 ML/MIN/1.73
ERYTHROCYTE [DISTWIDTH] IN BLOOD BY AUTOMATED COUNT: 13.7 % (ref 12.3–15.4)
GLOBULIN UR ELPH-MCNC: 2.6 GM/DL
GLUCOSE SERPL-MCNC: 77 MG/DL (ref 65–99)
HCT VFR BLD AUTO: 35.4 % (ref 34–46.6)
HGB BLD-MCNC: 11.6 G/DL (ref 12–15.9)
MAGNESIUM SERPL-MCNC: 1.9 MG/DL (ref 1.6–2.4)
MCH RBC QN AUTO: 29.7 PG (ref 26.6–33)
MCHC RBC AUTO-ENTMCNC: 32.8 G/DL (ref 31.5–35.7)
MCV RBC AUTO: 90.5 FL (ref 79–97)
PHOSPHATE SERPL-MCNC: 2.4 MG/DL (ref 2.5–4.5)
PLATELET # BLD AUTO: 174 10*3/MM3 (ref 140–450)
PMV BLD AUTO: 11.1 FL (ref 6–12)
POTASSIUM SERPL-SCNC: 3.8 MMOL/L (ref 3.5–5.2)
PROCALCITONIN SERPL-MCNC: 3.96 NG/ML (ref 0–0.25)
PROT SERPL-MCNC: 4.7 G/DL (ref 6–8.5)
RBC # BLD AUTO: 3.91 10*6/MM3 (ref 3.77–5.28)
SODIUM SERPL-SCNC: 141 MMOL/L (ref 136–145)
WBC NRBC COR # BLD: 17.85 10*3/MM3 (ref 3.4–10.8)

## 2023-10-05 PROCEDURE — 25810000003 SODIUM CHLORIDE 0.9 % SOLUTION 500 ML FLEX CONT: Performed by: INTERNAL MEDICINE

## 2023-10-05 PROCEDURE — 25810000003 SODIUM CHLORIDE 0.9 % SOLUTION: Performed by: PHYSICIAN ASSISTANT

## 2023-10-05 PROCEDURE — 85027 COMPLETE CBC AUTOMATED: CPT | Performed by: PHYSICIAN ASSISTANT

## 2023-10-05 PROCEDURE — 84145 PROCALCITONIN (PCT): CPT | Performed by: PHYSICIAN ASSISTANT

## 2023-10-05 PROCEDURE — 83735 ASSAY OF MAGNESIUM: CPT | Performed by: PHYSICIAN ASSISTANT

## 2023-10-05 PROCEDURE — 84100 ASSAY OF PHOSPHORUS: CPT | Performed by: PHYSICIAN ASSISTANT

## 2023-10-05 PROCEDURE — 80053 COMPREHEN METABOLIC PANEL: CPT | Performed by: PHYSICIAN ASSISTANT

## 2023-10-05 PROCEDURE — 25010000002 MAGNESIUM SULFATE IN D5W 1G/100ML (PREMIX) 1-5 GM/100ML-% SOLUTION: Performed by: PHYSICIAN ASSISTANT

## 2023-10-05 PROCEDURE — 97161 PT EVAL LOW COMPLEX 20 MIN: CPT

## 2023-10-05 PROCEDURE — 25810000003 LACTATED RINGERS PER 1000 ML: Performed by: INTERNAL MEDICINE

## 2023-10-05 PROCEDURE — 25010000002 CEFTRIAXONE PER 250 MG: Performed by: NURSE PRACTITIONER

## 2023-10-05 RX ORDER — MAGNESIUM SULFATE 1 G/100ML
1 INJECTION INTRAVENOUS ONCE
Status: COMPLETED | OUTPATIENT
Start: 2023-10-05 | End: 2023-10-06

## 2023-10-05 RX ORDER — FLUORIDE TOOTHPASTE
15 TOOTHPASTE DENTAL
Status: DISCONTINUED | OUTPATIENT
Start: 2023-10-05 | End: 2023-10-06 | Stop reason: HOSPADM

## 2023-10-05 RX ORDER — FENTANYL/ROPIVACAINE/NS/PF 2-625MCG/1
15 PLASTIC BAG, INJECTION (ML) EPIDURAL
Status: COMPLETED | OUTPATIENT
Start: 2023-10-05 | End: 2023-10-06

## 2023-10-05 RX ADMIN — HYDROCODONE BITARTRATE AND ACETAMINOPHEN 1 TABLET: 5; 325 TABLET ORAL at 17:57

## 2023-10-05 RX ADMIN — ACETAMINOPHEN 650 MG: 325 TABLET ORAL at 21:13

## 2023-10-05 RX ADMIN — POTASSIUM PHOSPHATE, MONOBASIC POTASSIUM PHOSPHATE, DIBASIC 15 MMOL: 224; 236 INJECTION, SOLUTION, CONCENTRATE INTRAVENOUS at 05:27

## 2023-10-05 RX ADMIN — MIDODRINE HYDROCHLORIDE 5 MG: 5 TABLET ORAL at 12:34

## 2023-10-05 RX ADMIN — Medication 15 ML: at 22:12

## 2023-10-05 RX ADMIN — CEFTRIAXONE SODIUM 2000 MG: 2 INJECTION, POWDER, FOR SOLUTION INTRAMUSCULAR; INTRAVENOUS at 23:38

## 2023-10-05 RX ADMIN — MAGNESIUM SULFATE 1 G: 1 INJECTION INTRAVENOUS at 05:27

## 2023-10-05 RX ADMIN — SODIUM CHLORIDE, POTASSIUM CHLORIDE, SODIUM LACTATE AND CALCIUM CHLORIDE 100 ML/HR: 600; 310; 30; 20 INJECTION, SOLUTION INTRAVENOUS at 04:10

## 2023-10-05 RX ADMIN — HYDROCODONE BITARTRATE AND ACETAMINOPHEN 1 TABLET: 5; 325 TABLET ORAL at 05:27

## 2023-10-05 RX ADMIN — HYDROCODONE BITARTRATE AND ACETAMINOPHEN 1 TABLET: 5; 325 TABLET ORAL at 22:11

## 2023-10-05 RX ADMIN — CEFTRIAXONE SODIUM 2000 MG: 2 INJECTION, POWDER, FOR SOLUTION INTRAMUSCULAR; INTRAVENOUS at 01:03

## 2023-10-05 RX ADMIN — SODIUM CHLORIDE 40 ML: 9 INJECTION, SOLUTION INTRAVENOUS at 10:24

## 2023-10-05 RX ADMIN — HYDROCODONE BITARTRATE AND ACETAMINOPHEN 1 TABLET: 5; 325 TABLET ORAL at 13:57

## 2023-10-05 RX ADMIN — SENNOSIDES AND DOCUSATE SODIUM 2 TABLET: 50; 8.6 TABLET ORAL at 08:33

## 2023-10-05 RX ADMIN — HYDROCODONE BITARTRATE AND ACETAMINOPHEN 1 TABLET: 5; 325 TABLET ORAL at 09:34

## 2023-10-05 RX ADMIN — PANTOPRAZOLE SODIUM 40 MG: 40 TABLET, DELAYED RELEASE ORAL at 05:27

## 2023-10-05 RX ADMIN — MIDODRINE HYDROCHLORIDE 5 MG: 5 TABLET ORAL at 08:27

## 2023-10-05 RX ADMIN — Medication 10 ML: at 08:27

## 2023-10-05 RX ADMIN — Medication 10 ML: at 21:13

## 2023-10-05 RX ADMIN — MIDODRINE HYDROCHLORIDE 5 MG: 5 TABLET ORAL at 17:57

## 2023-10-05 RX ADMIN — POTASSIUM PHOSPHATE, MONOBASIC POTASSIUM PHOSPHATE, DIBASIC 15 MMOL: 224; 236 INJECTION, SOLUTION, CONCENTRATE INTRAVENOUS at 10:24

## 2023-10-05 NOTE — PROGRESS NOTES
Pulmonary / Critical Care Progress Note      Patient Name: Marnie Parra  : 1945  MRN: [id number]  Attending:  Nicolas Velasquez MD   Date of admission: 10/2/2023    Subjective   Subjective   Patient critically ill with septic shock    Over past 24 hours:  Weaned off of pressors overnight  Output adequate out of nephrostomy tube  Overall improving  Growing gram-negative rods in the urine  Weak and fatigued  No nausea, fevers or chills  Does feel little bit better      Objective   Objective     Vitals:   Vital signs for last 24 hours:  Temp:  [97.6 °F (36.4 °C)-99.2 °F (37.3 °C)] 97.8 °F (36.6 °C)  Heart Rate:  [69-89] 75  Resp:  [10-19] 18  BP: ()/(46-91) 131/63    Intake/Output last 3 shifts:  I/O last 3 completed shifts:  In: 6943 [P.O.:520; I.V.:6423]  Out: 1625 [Urine:1625]  Intake/Output this shift:  I/O this shift:  In: 1123 [I.V.:1123]  Out: 950 [Urine:950]    Vent settings for last 24 hours:       Hemodynamic parameters for last 24 hours:       Physical Exam   Vital Signs Reviewed   General:  WDWN, Alert, NAD.    HEENT:  PERRL, EOMI.  OP, nares clear  Chest:  good aeration, clear to auscultation bilaterally, equal rise and fall of chest, no work of breathing noted at rest  CV: RRR, no MGR, pulses 2+, equal.  Abd:  Soft, NT, ND, + BS, no HSM  EXT:  no clubbing, no cyanosis, no edema  Neuro:  A&Ox3, CN grossly intact, no focal deficits.  Skin: No rashes or lesions noted        Result Review    Result Review:  I have personally reviewed the results from the time of this admission to 10/5/2023 05:43 EDT and agree with these findings:  [x]  Laboratory  [x]  Microbiology  [x]  Radiology  [x]  EKG/Telemetry   [x]  Cardiology/Vascular   []  Pathology  []  Old records  []  Other:  Most notable findings include:       Lab 10/05/23  0314 10/04/23  0255 10/02/23  2246 10/02/23  1312   WBC 17.85* 30.26* 5.22 13.78*   HEMOGLOBIN 11.6* 10.6* 11.6* 14.1   HEMATOCRIT 35.4 32.8* 36.5 43.3   PLATELETS 174 182  197 328   SODIUM 141 139 140 135*   POTASSIUM 3.8 4.3 3.4* 4.1   CHLORIDE 112* 111* 112* 104   CO2 22.7 22.1 18.0* 23.6   BUN 18 22 18 16   CREATININE 0.49* 0.70 0.76 0.67   GLUCOSE 77 84 127* 115*   CALCIUM 8.4* 8.5* 7.7* 9.2   PHOSPHORUS 2.4* 2.5  --   --    TOTAL PROTEIN 4.7* 4.9* 4.8* 6.9   ALBUMIN 2.1* 2.5* 2.5* 3.9   GLOBULIN 2.6 2.4 2.3 3.0       urine culture with gram-negative bacilli  Blood cultures x2 NGTD    Assessment & Plan   Assessment / Plan     Active Hospital Problems:  Active Hospital Problems    Diagnosis     **Ureterolithiasis     Kidney stones, calcium oxalate dihydrate     Hypotension          Impression:  Septic shock, present on admission  Obstructive uropathy  UTI secondary to gram-negative rods  History of radical cystectomy  Nephrolithiasis  Hypertension  Hypomagnesemia  Hypophosphatemia  Hypocalcemia  Hypokalemia  Transaminitis     Plan:  Continue midodrine  Now weaned off of pressors  Discontinue IV fluids.  May need to intermittently give Lasix for signs of volume overload  Appreciate urology assistance.  Right nephrostomy tube in place, urostomy [place] pouch in place.  Appreciate their assistance  Continue Rocephin to complete therapy.  Urine culture with 100,000 gram-negative bacilli (previous cultures with E. coli and Klebsiella o.)  Trend renal function electrolytes.  Replace magnesium, potassium and phosphorus IV  Regular diet  Up in chair       DVT prophylaxis:  Mechanical DVT prophylaxis orders are present.    CODE STATUS:   Medical Intervention Limits: NO intubation (DNI)  Level Of Support Discussed With: Patient  Code Status (Patient has no pulse and is not breathing): No CPR (Do Not Attempt to Resuscitate)  Medical Interventions (Patient has pulse or is breathing): Limited Support      Patient is critically ill with septic shock, present on admission, obstructive uropathy, UTI, history of radical cystectomy, nephrolithiasis, hypertension, electrolyte disturbances,  transaminitis. We have personally reviewed all pertinent labs, imaging, microbiology and documentation. We have discussed care with the primary service as well as at multidisciplinary critical care rounds with the bedside nurse, respiratory therapist, pharmacist and all other ancillary services.30 minutes of critical care time was spent managing this patient, excluding procedures.    Electronically signed by Jon Messina MD, 10/05/23, 2:50 PM EDT.

## 2023-10-05 NOTE — PLAN OF CARE
Goal Outcome Evaluation:  Plan of Care Reviewed With: (P) patient, daughter           Outcome Evaluation: (P) Patient presents with balance, endurance, and LE strength that have limited her functional mobility. She struggles with ambulation making it unsafe for her to return home at this time. Skilled PT services are required to address these deficits.      Anticipated Discharge Disposition (PT): (P) inpatient rehabilitation facility

## 2023-10-05 NOTE — PROGRESS NOTES
[facility]     Progress Note    Patient Name: Marnie Parra  : 1945  MRN: [id number]  Primary Care Physician:  Nicolas Velasquez MD  Date of admission: 10/2/2023    Subjective   Subjective     Chief Complaint: Patient was admitted because of pyelonephritis she had ureteral stone with stenosis of the ureters and hydroureter she is now feeling comfortable yesterday she developed this leukocytosis and her urine is growing greater than 100,000 bacilli the final pathology report report is still pending we will continue on current IV antibiotics until we get the culture and sensitivity results patient had become hypotensive after pain medication and had to be put on Levophed blood pressure now have stabilized she is comfortable doing feeling better she has remained afebrile vital signs stable she is being transferred to the regular floor we will watch her for 1 more day and hopefully we will get the culture results today and if we are able to switch her over to oral antibiotics she will be then discharged    HPI: With acute pyelonephritis with kidney stones and hydronephrosis is a history of bladder cancer in complete remission    Review of Systems   All systems were reviewed and negative except for: Reviewed    Objective   Objective     Vitals:   Temp:  [97.6 °F (36.4 °C)-98.9 °F (37.2 °C)] 97.9 °F (36.6 °C)  Heart Rate:  [69-89] 78  Resp:  [10-19] 18  BP: ()/(46-91) 134/68    Physical Exam    Constitutional: Awake, alert   Eyes: PERRLA, sclerae anicteric, no conjunctival injection   HENT: NCAT, mucous membranes moist   Neck: Supple, no thyromegaly, no lymphadenopathy, trachea midline   Respiratory: Clear to auscultation bilaterally, nonlabored respirations    Cardiovascular: RRR, no murmurs, rubs, or gallops, palpable pedal pulses bilaterally   Gastrointestinal: Positive bowel sounds, soft, nontender, nondistended   Musculoskeletal: No bilateral ankle edema, no clubbing or cyanosis to  extremities   Psychiatric: Appropriate affect, cooperative   Neurologic: Oriented x 3, strength symmetric in all extremities, Cranial Nerves grossly intact to confrontation, speech clear   Skin: No rashes   No change  Result Review    Result Review:  I have personally reviewed the results from the time of this admission to 10/5/2023 08:03 EDT and agree with these findings:  [x]  Laboratory leukocytosis  []  Microbiology  []  Radiology  []  EKG/Telemetry   []  Cardiology/Vascular   []  Pathology  []  Old records  []  Other:  Most notable findings include: Leukocytosis and probable acute pyelonephritis    Assessment & Plan   Assessment / Plan     Brief Patient Summary:  Marnie Parra is a 78 y.o. female who patient with kidney stone pyelonephritis with history of bladder cancer stable hypotension has resolved being transferred to the regular floor    Active Hospital Problems:  Active Hospital Problems    Diagnosis     **Ureterolithiasis     Kidney stones, calcium oxalate dihydrate     Hypotension        Plan:   Continue antibiotics we will change to oral when the sensitivity results are available    DVT prophylaxis:  Mechanical DVT prophylaxis orders are present.    CODE STATUS:   Medical Intervention Limits: NO intubation (DNI)  Level Of Support Discussed With: Patient  Code Status (Patient has no pulse and is not breathing): No CPR (Do Not Attempt to Resuscitate)  Medical Interventions (Patient has pulse or is breathing): Limited Support    Disposition:  I expect patient to be discharged after the patient has been stabilized.    Electronically signed by Nicolas Velasquez MD, 10/05/23, 8:03 AM EDT.      Part of this note may be an electronic transcription/translation of spoken language to printed text using the Dragon Dictation System.

## 2023-10-05 NOTE — THERAPY EVALUATION
Acute Care - Physical Therapy Initial Evaluation  [facility]     Patient Name: Marnie Parra  : 1945  MRN: [id number]  Today's Date: 10/5/2023   Admit date: 10/2/2023     Referring Physician: Nicolas Velasquez MD     Surgery Date:* No surgery found *           Visit Dx:     ICD-10-CM ICD-9-CM   1. Obstructive uropathy  N13.9 599.60   2. Difficulty in walking  R26.2 719.7     Patient Active Problem List   Diagnosis    Primary osteoarthritis of left knee    Sepsis    Cellulitis    Ureterolithiasis    Kidney stones, calcium oxalate dihydrate    Hypotension     Past Medical History:   Diagnosis Date    Arthritis     Cancer     GERD (gastroesophageal reflux disease)     Hyperlipidemia     Hypertension     Kidney stone     Stroke      Past Surgical History:   Procedure Laterality Date    ABDOMINAL SURGERY      APPENDECTOMY      COLONOSCOPY      CYSTECTOMY      bladder removal, with pouch made from stomach    HERNIA REPAIR      HYSTERECTOMY      KIDNEY STONE SURGERY      TUBAL ABDOMINAL LIGATION       PT Assessment (last 12 hours)       PT Evaluation and Treatment       Row Name 10/05/23 1151          Physical Therapy Time and Intention    Subjective Information complains of;weakness (P)   -RH     Document Type evaluation (P)   -RH     Mode of Treatment individual therapy;physical therapy (P)   -RH     Patient Effort good (P)   -RH     Symptoms Noted During/After Treatment fatigue;increased pain (P)   -RH       Row Name 10/05/23 1151          General Information    Patient Profile Reviewed yes (P)   -RH     Patient Observations alert;cooperative;agree to therapy (P)   -RH     Prior Level of Function independent:;all household mobility;community mobility;ADL's (P)   -RH     Equipment Currently Used at Home cane, quad;walker, rolling (P)   -RH     Existing Precautions/Restrictions fall (P)   -RH       Row Name 10/05/23 1152          Living Environment    Current Living Arrangements home (P)   -RH     Home  Accessibility other (see comments) (P)   no stairs  -RH     People in Home child(shane), adult (P)   Daughter  -RH     Primary Care Provided by self (P)   -RH       Row Name 10/05/23 1151          Home Use of Assistive/Adaptive Equipment    Equipment Currently Used at Home cane, quad;walker, rolling (P)   -RH       Row Name 10/05/23 1151          Range of Motion (ROM)    Range of Motion bilateral lower extremities;ROM is WFL (P)   -       Row Name 10/05/23 1151          Strength (Manual Muscle Testing)    Strength (Manual Muscle Testing) bilateral lower extremities (P)   Hips: 4-/5, Quads: 4+/5, Hamstrings: 5/5, Ankles: 4-/5  -RH       Row Name 10/05/23 1151          Bed Mobility    Bed Mobility bed mobility (all) activities (P)   -RH     All Activities, [place] (Bed Mobility) standby assist (P)   -RH       Row Name 10/05/23 1151          Transfers    Transfers sit-stand transfer;stand-sit transfer (P)   -       Row Name 10/05/23 1151          Sit-Stand Transfer    Sit-Stand [place] (Transfers) contact guard (P)   -RH     Assistive Device (Sit-Stand Transfers) walker, front-wheeled (P)   -       Row Name 10/05/23 1151          Stand-Sit Transfer    Stand-Sit [place] (Transfers) contact guard (P)   -RH     Assistive Device (Stand-Sit Transfers) walker, front-wheeled (P)   -RH       Row Name 10/05/23 1151          Gait/Stairs (Locomotion)    Gait/Stairs Locomotion gait/ambulation assistive device (P)   -RH     [place] Level (Gait) contact guard (P)   -RH     Assistive Device (Gait) walker, front-wheeled (P)   -RH     Patient was able to Ambulate yes (P)   -RH     Distance in Feet (Gait) 20 (P)   -RH     Pattern (Gait) step-to (P)   -RH     Deviations/Abnormal Patterns (Gait) base of support, narrow;gait speed decreased;stride length decreased (P)   -RH       Row Name 10/05/23 1151          Safety Issues, Functional Mobility    Impairments Affecting Function (Mobility) balance;endurance/activity  tolerance;strength (P)   -       Row Name 10/05/23 1151          Balance    Balance Assessment standing dynamic balance (P)   -     Dynamic Standing Balance contact guard (P)   -     Position/Device Used, Standing Balance supported;walker, front-wheeled (P)   -       Row Name 10/05/23 1151          Plan of Care Review    Plan of Care Reviewed With patient;daughter (P)   -     Outcome Evaluation Patient presents with balance, endurance, and LE strength that have limited her functional mobility. She struggles with ambulation making it unsafe for her to return home at this time. Skilled PT services are required to address these deficits. (P)   -       Row Name 10/05/23 1151          Therapy Assessment/Plan (PT)    Rehab Potential (PT) good, to achieve stated therapy goals (P)   -     Criteria for Skilled Interventions Met (PT) skilled treatment is necessary (P)   -     Therapy Frequency (PT) daily (P)   -     Predicted Duration of Therapy Intervention (PT) 10 days (P)   -     Problem List (PT) problems related to;balance;mobility;strength (P)   -     Activity Limitations Related to Problem List (PT) unable to ambulate safely;unable to transfer safely;BADLs not performed adequately or safely;IADLs not performed adequately or safely;community activities not performed adequately or safely (P)   -       Row Name 10/05/23 1151          Therapy Plan Review/Discharge Plan (PT)    Therapy Plan Review (PT) evaluation/treatment results reviewed;care plan/treatment goals reviewed;participants included;patient;daughter;participants voiced agreement with care plan (P)   -       Row Name 10/05/23 1151          Physical Therapy Goals    Transfer Goal Selection (PT) transfer, PT goal 1 (P)   -     Gait Training Goal Selection (PT) gait training, PT goal 1 (P)   -     Strength Goal Selection (PT) strength, PT goal 1 (P)   -       Row Name 10/05/23 1151          Transfer Goal 1 (PT)    Activity/Assistive  Device (Transfer Goal 1, PT) transfers, all (P)   -RH     [place] Level/Cues Needed (Transfer Goal 1, PT) independent (P)   -RH     Time Frame (Transfer Goal 1, PT) long term goal (LTG);10 days (P)   -       Row Name 10/05/23 1151          Gait Training Goal 1 (PT)    Activity/Assistive Device (Gait Training Goal 1, PT) gait (walking locomotion);assistive device use;walker, rolling (P)   -RH     [place] Level (Gait Training Goal 1, PT) modified independence (P)   -RH     Distance (Gait Training Goal 1, PT) 100ft (P)   -RH     Time Frame (Gait Training Goal 1, PT) long term goal (LTG);10 days (P)   -       Row Name 10/05/23 1151          Strength Goal 1 (PT)    Strength Goal 1 (PT) 5/5 bilateral LE strength (P)   -RH     Time Frame (Strength Goal 1, PT) long term goal (LTG);10 days (P)   -RH               User Key  (r) = Recorded By, (t) = Taken By, (c) = Cosigned By      Initials Name Provider Type     Shankar Riddle, PT Student PT Student                    Physical Therapy Education       Title: PT OT SLP Therapies (Done)       Topic: Physical Therapy (Done)       Point: Mobility training (Done)       Learning Progress Summary             Patient Acceptance, E,TB, VU by  at 10/5/2023 1157                         Point: Home exercise program (Done)       Learning Progress Summary             Patient Acceptance, E,TB, VU by  at 10/5/2023 1157                         Point: Body mechanics (Done)       Learning Progress Summary             Patient Acceptance, E,TB, VU by  at 10/5/2023 1157                                         User Key       Initials Effective Dates Name Provider Type Discipline     08/16/23 -  Shankar Riddle, PT Student PT Student PT                  PT Recommendation and Plan  Anticipated Discharge Disposition (PT): (P) inpatient rehabilitation facility  Planned Therapy Interventions (PT): (P) balance training, bed mobility training, gait training, home  exercise program, stair training, strengthening, stretching, transfer training  Therapy Frequency (PT): (P) daily  Plan of Care Reviewed With: (P) patient, daughter  Outcome Evaluation: (P) Patient presents with balance, endurance, and LE strength that have limited her functional mobility. She struggles with ambulation making it unsafe for her to return home at this time. Skilled PT services are required to address these deficits.   Outcome Measures       Row Name 10/05/23 1100             How much help from another person do you currently need...    Turning from your back to your side while in flat bed without using bedrails? 4 (P)   -RH      Moving from lying on back to sitting on the side of a flat bed without bedrails? 4 (P)   -RH      Moving to and from a bed to a chair (including a wheelchair)? 4 (P)   -RH      Standing up from a chair using your arms (e.g., wheelchair, bedside chair)? 3 (P)   -RH      Climbing 3-5 steps with a railing? 3 (P)   -RH      To walk in hospital room? 3 (P)   -RH      AM-PAC 6 Clicks Score (PT) 21 (P)   -RH                User Key  (r) = Recorded By, (t) = Taken By, (c) = Cosigned By      Initials Name Provider Type     Shankar Riddle PT Student PT Student                     Time Calculation:    PT Charges       Row Name 10/05/23 1151             Time Calculation    PT Received On 10/05/23 (P)   -RH      PT Goal Re-Cert Due Date 10/14/23 (P)   -RH         Untimed Charges    PT Eval/Re-eval Minutes 25 (P)   -RH         Total Minutes    Untimed Charges Total Minutes 25 (P)   -RH       Total Minutes 25 (P)   -RH                User Key  (r) = Recorded By, (t) = Taken By, (c) = Cosigned By      Initials Name Provider Type     Shankar Riddle, PT Student PT Student                      PT G-Codes  AM-PAC 6 Clicks Score (PT): (P) 21    Shankar Riddle PT Student  10/5/2023

## 2023-10-06 VITALS
HEART RATE: 72 BPM | SYSTOLIC BLOOD PRESSURE: 151 MMHG | BODY MASS INDEX: 20.01 KG/M2 | HEIGHT: 68 IN | TEMPERATURE: 97.7 F | DIASTOLIC BLOOD PRESSURE: 58 MMHG | WEIGHT: 132.06 LBS | OXYGEN SATURATION: 91 % | RESPIRATION RATE: 18 BRPM

## 2023-10-06 LAB
ANION GAP SERPL CALCULATED.3IONS-SCNC: 2.6 MMOL/L (ref 5–15)
BACTERIA SPEC AEROBE CULT: ABNORMAL
BACTERIA SPEC AEROBE CULT: ABNORMAL
BASOPHILS # BLD AUTO: 0.04 10*3/MM3 (ref 0–0.2)
BASOPHILS NFR BLD AUTO: 0.4 % (ref 0–1.5)
BUN SERPL-MCNC: 10 MG/DL (ref 8–23)
BUN/CREAT SERPL: 18.5 (ref 7–25)
CALCIUM SPEC-SCNC: 8.2 MG/DL (ref 8.6–10.5)
CHLORIDE SERPL-SCNC: 112 MMOL/L (ref 98–107)
CO2 SERPL-SCNC: 28.4 MMOL/L (ref 22–29)
CREAT SERPL-MCNC: 0.54 MG/DL (ref 0.57–1)
DEPRECATED RDW RBC AUTO: 45.6 FL (ref 37–54)
EGFRCR SERPLBLD CKD-EPI 2021: 95 ML/MIN/1.73
EOSINOPHIL # BLD AUTO: 0.14 10*3/MM3 (ref 0–0.4)
EOSINOPHIL NFR BLD AUTO: 1.3 % (ref 0.3–6.2)
ERYTHROCYTE [DISTWIDTH] IN BLOOD BY AUTOMATED COUNT: 13.5 % (ref 12.3–15.4)
GLUCOSE SERPL-MCNC: 83 MG/DL (ref 65–99)
HCT VFR BLD AUTO: 36.3 % (ref 34–46.6)
HGB BLD-MCNC: 11.9 G/DL (ref 12–15.9)
IMM GRANULOCYTES # BLD AUTO: 0.07 10*3/MM3 (ref 0–0.05)
IMM GRANULOCYTES NFR BLD AUTO: 0.7 % (ref 0–0.5)
LYMPHOCYTES # BLD AUTO: 0.62 10*3/MM3 (ref 0.7–3.1)
LYMPHOCYTES NFR BLD AUTO: 5.8 % (ref 19.6–45.3)
MCH RBC QN AUTO: 29.8 PG (ref 26.6–33)
MCHC RBC AUTO-ENTMCNC: 32.8 G/DL (ref 31.5–35.7)
MCV RBC AUTO: 91 FL (ref 79–97)
MONOCYTES # BLD AUTO: 0.37 10*3/MM3 (ref 0.1–0.9)
MONOCYTES NFR BLD AUTO: 3.5 % (ref 5–12)
NEUTROPHILS NFR BLD AUTO: 88.3 % (ref 42.7–76)
NEUTROPHILS NFR BLD AUTO: 9.48 10*3/MM3 (ref 1.7–7)
NRBC BLD AUTO-RTO: 0 /100 WBC (ref 0–0.2)
PLATELET # BLD AUTO: 212 10*3/MM3 (ref 140–450)
PMV BLD AUTO: 10.9 FL (ref 6–12)
POTASSIUM SERPL-SCNC: 4.3 MMOL/L (ref 3.5–5.2)
RBC # BLD AUTO: 3.99 10*6/MM3 (ref 3.77–5.28)
SODIUM SERPL-SCNC: 143 MMOL/L (ref 136–145)
WBC NRBC COR # BLD: 10.72 10*3/MM3 (ref 3.4–10.8)

## 2023-10-06 PROCEDURE — 80048 BASIC METABOLIC PNL TOTAL CA: CPT | Performed by: INTERNAL MEDICINE

## 2023-10-06 PROCEDURE — 85025 COMPLETE CBC W/AUTO DIFF WBC: CPT | Performed by: INTERNAL MEDICINE

## 2023-10-06 RX ORDER — SULFAMETHOXAZOLE AND TRIMETHOPRIM 800; 160 MG/1; MG/1
1 TABLET ORAL EVERY 12 HOURS SCHEDULED
Qty: 14 TABLET | Refills: 0 | Status: SHIPPED | OUTPATIENT
Start: 2023-10-06 | End: 2023-10-13

## 2023-10-06 RX ORDER — SULFAMETHOXAZOLE AND TRIMETHOPRIM 800; 160 MG/1; MG/1
1 TABLET ORAL EVERY 12 HOURS SCHEDULED
Status: DISCONTINUED | OUTPATIENT
Start: 2023-10-06 | End: 2023-10-06 | Stop reason: HOSPADM

## 2023-10-06 RX ADMIN — HYDROCODONE BITARTRATE AND ACETAMINOPHEN 1 TABLET: 5; 325 TABLET ORAL at 09:23

## 2023-10-06 RX ADMIN — Medication 15 ML: at 06:33

## 2023-10-06 RX ADMIN — SULFAMETHOXAZOLE AND TRIMETHOPRIM 1 TABLET: 800; 160 TABLET ORAL at 09:23

## 2023-10-06 RX ADMIN — SENNOSIDES AND DOCUSATE SODIUM 2 TABLET: 50; 8.6 TABLET ORAL at 09:25

## 2023-10-06 RX ADMIN — MIDODRINE HYDROCHLORIDE 5 MG: 5 TABLET ORAL at 06:33

## 2023-10-06 RX ADMIN — Medication 10 ML: at 09:24

## 2023-10-06 RX ADMIN — PANTOPRAZOLE SODIUM 40 MG: 40 TABLET, DELAYED RELEASE ORAL at 06:33

## 2023-10-06 RX ADMIN — HYDROCODONE BITARTRATE AND ACETAMINOPHEN 1 TABLET: 5; 325 TABLET ORAL at 04:13

## 2023-10-06 NOTE — PROGRESS NOTES
Pulmonary / Critical Care Progress Note      Patient Name: Marnie Parra  : 1945  MRN: [id number]  Attending:  Nicolas Velasquez MD   Date of admission: 10/2/2023    Subjective   Subjective   Patient follow-up for septic shock    Over past 24 hours:  Out of ICU  On room air  Overall improving  No nausea, fevers or chills  No respiratory complaints      Objective   Objective     Vitals:   Vital signs for last 24 hours:  Temp:  [97.7 °F (36.5 °C)-100.2 °F (37.9 °C)] 98.2 °F (36.8 °C)  Heart Rate:  [74-87] 87  Resp:  [16-18] 18  BP: (110-146)/(46-66) 144/56    Intake/Output last 3 shifts:  I/O last 3 completed shifts:  In: 2590 [P.O.:660; I.V.:1780; IV Piggyback:150]  Out: 2850 [Urine:2850]  Intake/Output this shift:  No intake/output data recorded.      Physical Exam   Vital Signs Reviewed   General:  WDWN, Alert, NAD.    HEENT:  PERRL, EOMI.  OP, nares clear  Chest:  good aeration, clear to auscultation bilaterally, equal rise and fall of chest, no work of breathing noted at rest  CV: RRR, no MGR, pulses 2+, equal.  Abd:  Soft, NT, ND, + BS, no HSM  EXT:  no clubbing, no cyanosis, no edema  Neuro:  A&Ox3, CN grossly intact, no focal deficits.  Skin: No rashes or lesions noted        Result Review    Result Review:  I have personally reviewed the results from the time of this admission to 10/6/2023 07:06 EDT and agree with these findings:  [x]  Laboratory  [x]  Microbiology  [x]  Radiology  [x]  EKG/Telemetry   [x]  Cardiology/Vascular   []  Pathology  []  Old records  []  Other:  Most notable findings include:       Lab 10/06/23  0611 10/05/23  0314 10/04/23  0255 10/02/23  2246 10/02/23  1312   WBC 10.72 17.85* 30.26* 5.22 13.78*   HEMOGLOBIN 11.9* 11.6* 10.6* 11.6* 14.1   HEMATOCRIT 36.3 35.4 32.8* 36.5 43.3   PLATELETS 212 174 182 197 328   SODIUM 143 141 139 140 135*   POTASSIUM 4.3 3.8 4.3 3.4* 4.1   CHLORIDE 112* 112* 111* 112* 104   CO2 28.4 22.7 22.1 18.0* 23.6   BUN 10 18 22 18 16   CREATININE  0.54* 0.49* 0.70 0.76 0.67   GLUCOSE 83 77 84 127* 115*   CALCIUM 8.2* 8.4* 8.5* 7.7* 9.2   PHOSPHORUS  --  2.4* 2.5  --   --    TOTAL PROTEIN  --  4.7* 4.9* 4.8* 6.9   ALBUMIN  --  2.1* 2.5* 2.5* 3.9   GLOBULIN  --  2.6 2.4 2.3 3.0       urine culture with gram-negative bacilli  Blood cultures x2 NGTD    Assessment & Plan   Assessment / Plan     Active Hospital Problems:  Active Hospital Problems    Diagnosis     **Ureterolithiasis     Kidney stones, calcium oxalate dihydrate     Hypotension          Impression:  Septic shock, present on admission  Obstructive uropathy  UTI secondary to gram-negative rods  History of radical cystectomy  Nephrolithiasis  Hypertension  Hypomagnesemia  Hypophosphatemia  Hypocalcemia  Hypokalemia  Transaminitis     Plan:  Blood pressure adequate now okay to DC midodrine  Appreciate urology assistance.  Right nephrostomy tube in place, urostomy [place] pouch in place.  Appreciate their assistance  Continue antibiotics.  Can discharge on Levaquin to complete 14 days of antibiotics  Trend renal function electrolytes.   Regular diet  Up in chair  On room air       DVT prophylaxis:  Mechanical DVT prophylaxis orders are present.    CODE STATUS:   Medical Intervention Limits: NO intubation (DNI)  Level Of Support Discussed With: Patient  Code Status (Patient has no pulse and is not breathing): No CPR (Do Not Attempt to Resuscitate)  Medical Interventions (Patient has pulse or is breathing): Limited Support      Labs, imaging, microbiology, notes and medications personally reviewed  Discussed with primary    Electronically signed by Jon Messina MD, 10/06/23, 5:39 PM EDT.

## 2023-10-06 NOTE — DISCHARGE SUMMARY
[facility]         DISCHARGE SUMMARY    Patient Name: Marnie Parra  : 1945  MRN: [id number]    Date of Admission: 10/2/2023  Date of Discharge: 10/6/2023  Primary Care Physician: Nicolas Velasquez MD    Consults       Date and Time Order Name Status Description    10/2/2023 10:14 PM Inpatient Urology Consult      10/2/2023  8:06 PM Inpatient Intensivist Consult Completed     10/2/2023  4:00 PM IP General Consult (Use specialty-specific consult if known)      10/2/2023  2:29 PM Urology (on-call MD unless specified) Completed             Presenting Problem:   Ureterolithiasis [N20.1]  Hypotension [I95.9]  Obstructive uropathy [N13.9]  Kidney stones, calcium oxalate dihydrate [N20.0]  UTI (urinary tract infection) [N39.0]    Active and Resolved Hospital Problems:  Active Hospital Problems    Diagnosis POA    **Ureterolithiasis [N20.1] Yes    Kidney stones, calcium oxalate dihydrate [N20.0] Yes    Hypotension [I95.9] Yes      Resolved Hospital Problems   No resolved problems to display.         Hospital Course     Hospital Course:  Marnie Parra is a 78 y.o. female patient was admitted to the hospital because of very severe pain was found to have ureteric stones which was causing urine tract colic a consultation was made with Dr. 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 coli which is sensitive to Septra we will give her 7 more days of Septra and discharge her we have discussed the case with Dr. Ruby his her urologist in [place] at the [facility] both  and Dr. Ruby have been notified about this patient and that they will be making the arrangements for the stones to be removed as they cannot be removed here as per nephrology in this hospital she was admitted to the unit because her blood pressure dropped  down and she became have to be put on Levophed now her white count has subsided has normalized she has become afebrile cultures are positive we know the bacteria and it has been explained to her she is otherwise feeling fine and is therefore being discharged      DISCHARGE Follow Up Recommendations for labs and diagnostics: Patient with urinary tract infection as well as had ureteric colic      Pertinent  and/or Most Recent Results     LAB RESULTS:      Lab 10/06/23  0611 10/05/23  0314 10/04/23  0255 10/03/23  0805 10/03/23  0322 10/02/23  2246 10/02/23  1312   WBC 10.72 17.85* 30.26*  --   --  5.22 13.78*   HEMOGLOBIN 11.9* 11.6* 10.6*  --   --  11.6* 14.1   HEMATOCRIT 36.3 35.4 32.8*  --   --  36.5 43.3   PLATELETS 212 174 182  --   --  197 328   NEUTROS ABS 9.48*  --   --   --   --  4.91 11.76*   IMMATURE GRANS (ABS) 0.07*  --   --   --   --   --  0.06*   LYMPHS ABS 0.62*  --   --   --   --   --  0.93   MONOS ABS 0.37  --   --   --   --   --  0.91*   EOS ABS 0.14  --   --   --   --   --  0.07   MCV 91.0 90.5 92.9  --   --  94.6 91.4   PROCALCITONIN  --  3.96*  --   --   --   --   --    LACTATE  --   --   --  1.6  --   --  0.9   PROTIME  --   --   --   --  15.1*  --  12.1   APTT  --   --   --   --   --   --  26.9         Lab 10/06/23  0611 10/05/23  0314 10/04/23  0255 10/02/23  2246 10/02/23  1312   SODIUM 143 141 139 140 135*   POTASSIUM 4.3 3.8 4.3 3.4* 4.1   CHLORIDE 112* 112* 111* 112* 104   CO2 28.4 22.7 22.1 18.0* 23.6   ANION GAP 2.6* 6.3 5.9 10.0 7.4   BUN 10 18 22 18 16   CREATININE 0.54* 0.49* 0.70 0.76 0.67   EGFR 95.0 97.2 89.2 80.8 90.2   GLUCOSE 83 77 84 127* 115*   CALCIUM 8.2* 8.4* 8.5* 7.7* 9.2   MAGNESIUM  --  1.9 1.9  --   --    PHOSPHORUS  --  2.4* 2.5  --   --          Lab 10/05/23  0314 10/04/23  0255 10/02/23  2246 10/02/23  1312   TOTAL PROTEIN 4.7* 4.9* 4.8* 6.9   ALBUMIN 2.1* 2.5* 2.5* 3.9   GLOBULIN 2.6 2.4 2.3 3.0   ALT (SGPT) 180* 298* 434* 20   AST (SGOT) 54* 152* 888* 17    BILIRUBIN 0.2 0.2 1.1 0.3   ALK PHOS 120* 118* 130* 95   LIPASE  --   --   --  33         Lab 10/03/23  0322 10/02/23  1312   PROTIME 15.1* 12.1   INR 1.18* 0.88                 Brief Urine Lab Results  (Last result in the past 365 days)        Color   Clarity   Blood   Leuk Est   Nitrite   Protein   CREAT   Urine HCG        10/02/23 1721 Yellow   Turbid   Large (3+)   Large (3+)   Positive   100 mg/dL (2+)                 Microbiology Results (last 10 days)       Procedure Component Value - Date/Time    Urine Culture - Urine, Nephrostomy Right [[id number]] Collected: 10/02/23 1721    Lab Status: Final result Specimen: Urine from Nephrostomy Right Updated: 10/03/23 1039     Urine Culture >100,000 CFU/mL Mixed Reena Isolated    Narrative:      Specimen contains mixed organisms of questionable pathogenicity suggestive of contamination. If symptoms persist, suggest recollection.  Colonization of the urinary tract without infection is common. Treatment is discouraged unless the patient is symptomatic, pregnant, or undergoing an invasive urologic procedure.    Blood Culture - Blood, Arm, Right [[id number]]  (Normal) Collected: 10/02/23 1624    Lab Status: Preliminary result Specimen: Blood from Arm, Right Updated: 10/05/23 1645     Blood Culture No growth at 3 days    Blood Culture - Blood, Arm, Left [[id number]]  (Normal) Collected: 10/02/23 1624    Lab Status: Preliminary result Specimen: Blood from Arm, Left Updated: 10/05/23 1645     Blood Culture No growth at 3 days    Urine Culture - Urine, Urine, Clean Catch [[id number]]  (Abnormal)  (Susceptibility) Collected: 10/02/23 1404    Lab Status: Preliminary result Specimen: Urine, Clean Catch Updated: 10/06/23 0324     Urine Culture >100,000 CFU/mL Escherichia coli    Narrative:      Colonization of the urinary tract without infection is common. Treatment is discouraged unless the patient is symptomatic, pregnant, or undergoing an invasive urologic procedure.     Susceptibility        Escherichia coli      CHARLA      Ampicillin Resistant      Ampicillin + Sulbactam Intermediate      Cefazolin Susceptible      Cefepime Susceptible      Ceftazidime Susceptible      Ceftriaxone Susceptible      Gentamicin Susceptible      Levofloxacin Susceptible      Nitrofurantoin Susceptible      Piperacillin + Tazobactam Susceptible      Trimethoprim + Sulfamethoxazole Susceptible                                   XR Abdomen Flat & Upright    Result Date: 10/3/2023  Impression:   1. Right-sided nephrostomy tube is in place and appears in appropriate position. 2. Nonspecific, nonobstructive bowel gas pattern     Robin Cabrera MD       Electronically Signed and Approved By: Robin Cabrera MD on 10/03/2023 at 13:47             CT Abdomen Pelvis Stone Protocol    Result Date: 10/2/2023  Impression:    1. 1 cm distal right ureteral calculus resulting severe obstructive uropathy, status post cystectomy with cecal reservoir  2. Bronchiolitis changes in the lung bases  These results were called to the ordering clinician's office at 11:03 a.m.     RICARDO MAZA MD       Electronically Signed and Approved By: RICARDO MAZA MD on 10/02/2023 at 11:04             CT Guided Nephrostomy Tube Placement    Result Date: 10/2/2023  Impression:   1. Successful CT-guided placement of 8 Hungarian nephrostomy tube into the right renal collecting system with no immediate complication. 2. A sample of cloudy yellow urine was sent to the lab for culture.  A dose of Rocephin was given prior to the procedure per nursing notes.     DAWSON JAY MD       Electronically Signed and Approved By: DAWSON JAY MD on 10/02/2023 at 17:52                          Labs Pending at Discharge:  Pending Labs       Order Current Status    Blood Culture - Blood, Arm, Left Preliminary result    Blood Culture - Blood, Arm, Right Preliminary result    Urine Culture - Urine, Urine, Clean Catch Preliminary result               Discharge Details        Discharge Medications        New Medications        Instructions Start Date   sulfamethoxazole-trimethoprim 800-160 MG per tablet  Commonly known as: BACTRIM DS,SEPTRA DS   1 tablet, Oral, Every 12 Hours Scheduled             Continue These Medications        Instructions Start Date   acetaminophen 500 MG tablet  Commonly known as: TYLENOL   1,000 mg, Oral, Every 4 Hours PRN      aspirin 81 MG EC tablet   81 mg, Oral, Daily      docusate sodium 100 MG capsule  Commonly known as: COLACE   100 mg, Oral, Daily      MAGnesium-Oxide 400 (241.3 Mg) MG tablet tablet  Generic drug: magnesium oxide   200 mg, Oral, 2 Times Daily      metoprolol succinate XL 25 MG 24 hr tablet  Commonly known as: TOPROL-XL   12.5 mg, Oral, Daily      multivitamin with minerals tablet tablet   1 tablet, Oral, Daily      pantoprazole 40 MG EC tablet  Commonly known as: PROTONIX   40 mg, Oral, As Needed             Stop These Medications      amLODIPine 2.5 MG tablet  Commonly known as: NORVASC     NON FORMULARY              Allergies   Allergen Reactions    Levofloxacin Other (See Comments)     TORE ROTATOR CUFF  Other reaction(s): Rotator cuff tear arthropathy      Propofol GI Intolerance    Dilaudid [Hydromorphone] Other (See Comments), Mental Status Change and Hallucinations     Dilaudid causes the patient to hallucinate.    Gabapentin Other (See Comments) and Myalgia    Propranolol Unknown - Low Severity    Adhesive Tape Rash    Celecoxib Unknown - High Severity and Rash         Discharge Disposition:  Home or Self Care    Diet:  Hospital:  Diet Order   Procedures    Diet: Regular/House Diet; Texture: Regular Texture (IDDSI 7); Fluid Consistency: Thin (IDDSI 0)         Discharge Activity:   Activity Instructions       Discharge Activity      Maintain right nephrostomy tube.  Keep around nephrostomy tube clean and dry; empty bag as needed.    Sure that tubing does not become kinked and that nephrostomy tube  was able to drain at all times.    Call [facility] urology or go [facility] ER if issues with nephrostomy or not draining prior to [facility]    Call Dr. Ruby's office to schedule follow up.              CODE STATUS:  Code Status and Medical Interventions:   Ordered at: 10/04/23 1624     Medical Intervention Limits:    NO intubation (DNI)     Level Of Support Discussed With:    Patient     Code Status (Patient has no pulse and is not breathing):    No CPR (Do Not Attempt to Resuscitate)     Medical Interventions (Patient has pulse or is breathing):    Limited Support         No future appointments.    Additional Instructions for the Follow-ups that You Need to Schedule       Discharge Follow-up with Specified Provider: Dr. Ruby; call to schedule appointment   As directed      To: Dr. Ruby; call to schedule appointment   Follow Up Details: [phone]                Time spent on Discharge including face to face service: 45 minutes    Electronically signed by Nicolas Velasquez MD, 10/06/23, 7:40 AM EDT.    Part of this note may be an electronic transcription/translation of spoken language to printed text using the Dragon Dictation System.

## 2023-10-06 NOTE — DISCHARGE INSTR - APPOINTMENTS
Please call Dr. Velasquez's office on Monday for a hospital follow up appt. Due to the office being closed on Friday's.    Follow up with Dr. Ruby on 11/09/23 at 1:00 p.m.  Please bring insurance cards and a medication list.

## 2023-10-07 ENCOUNTER — READMISSION MANAGEMENT (OUTPATIENT)
Dept: CALL CENTER | Facility: HOSPITAL | Age: 78
End: 2023-10-07
Payer: MEDICARE

## 2023-10-07 LAB
BACTERIA SPEC AEROBE CULT: NORMAL
BACTERIA SPEC AEROBE CULT: NORMAL

## 2023-10-07 NOTE — OUTREACH NOTE
Prep Survey      Flowsheet Row Responses   [facility]? [facility]   Is LACE score < 7 ? No   Eligibility Readm Mgmt   Discharge diagnosis septic shock, Ureterolithiasis, hypotension,Kidney stones, UTI, s/p nephrostomy tube placement   Does the patient have one of the following disease processes/diagnoses(primary or secondary)? Sepsis   Does the patient have Home health ordered? Yes   What is the Home health agency?  [facility]   Is there a DME ordered? No   Prep survey completed? Yes            Yael GREEN - Registered Nurse

## 2023-10-12 ENCOUNTER — READMISSION MANAGEMENT (OUTPATIENT)
Dept: CALL CENTER | Facility: HOSPITAL | Age: 78
End: 2023-10-12
Payer: MEDICARE

## 2023-10-12 NOTE — OUTREACH NOTE
Sepsis Week 1 Survey      Flowsheet Row Responses   [facility] patient discharged from? [facility]   Does the patient have one of the following disease processes/diagnoses(primary or secondary)? Sepsis   Week 1 attempt successful? Yes   Call start time 1051   Call end time 1056   Discharge diagnosis septic shock, Ureterolithiasis, hypotension,Kidney stones, UTI, s/p nephrostomy tube placement   Meds reviewed with patient/caregiver? Yes   Is the patient having any side effects they believe may be caused by any medication additions or changes? No   Does the patient have all medications related to this admission filled (includes all antibiotics, inhalers, nebulizers,steroids,etc.) Yes   Is the patient taking all medications as directed (includes completed medication regime)? Yes   Comments regarding appointments Urology follow up 10/18/23   Does the patient have a primary care provider?  Yes   Comments regarding PCP PCP follow up scheduled.   Does the patient have an appointment with their PCP within 7 days of discharge? Yes   Has the patient kept scheduled appointments due by today? N/A   What is the Home health agency?  [facility]   Has home health visited the patient within 72 hours of discharge? Yes   Psychosocial issues? No   Did the patient receive a copy of their discharge instructions? Yes   Nursing interventions Reviewed instructions with patient   What is the patient's perception of their health status since discharge? Improving   Nursing interventions Nurse provided patient education   Is the patient/caregiver able to teach back TIME? T emperature - higher or lower than normal, I nfection - may have signs and symptoms of an infection, M ental Decline - confused, sleepy, difficult to arouse, E xtremely Ill - severe pain, discomfort, shortness of breath   Nursing interventions Nurse provided patient education   Is patient/caregiver able to teach back steps to recovery at home? Rest and regain strength, Set  small, achievable goals for return to baseline health, Eat a balanced diet   Is the patient/caregiver able to teach back signs and symptoms of worsening condition: Fever, Rapid heart rate (>90), Shortness of breath/rapid respiratory rate, Altered mental status(confusion/coma)   If the patient is a current smoker, are they able to teach back resources for cessation? Smoking cessation medications   Is the patient/caregiver able to teach back the hierarchy of who to call/visit for symptoms/problems? PCP, Specialist, Home health nurse, Urgent Care, ED, 911 Yes   Week 1 call completed? Yes   Is the patient interested in additional calls from an ambulatory ? No   Would this patient benefit from a Referral to [facility] Social Work? No   Wrap up additional comments Patient reports improvement-still weak. Follow up appts in place.   Call end time 1056            Aliya REHMAN - Registered Nurse

## 2023-10-25 ENCOUNTER — TRANSCRIBE ORDERS (OUTPATIENT)
Dept: ADMINISTRATIVE | Facility: HOSPITAL | Age: 78
End: 2023-10-25
Payer: MEDICARE

## 2023-10-25 DIAGNOSIS — N13.1 HYDRONEPHROSIS WITH URETERAL STRICTURE: Primary | ICD-10-CM

## 2023-10-27 ENCOUNTER — HOSPITAL ENCOUNTER (EMERGENCY)
Facility: HOSPITAL | Age: 78
Discharge: OTHER INSTITUTION | End: 2023-10-28
Attending: EMERGENCY MEDICINE
Payer: MEDICARE

## 2023-10-27 ENCOUNTER — APPOINTMENT (OUTPATIENT)
Dept: CT IMAGING | Facility: HOSPITAL | Age: 78
End: 2023-10-27
Payer: MEDICARE

## 2023-10-27 DIAGNOSIS — K56.609 SMALL BOWEL OBSTRUCTION: Primary | ICD-10-CM

## 2023-10-27 LAB
ALBUMIN SERPL-MCNC: 3.3 G/DL (ref 3.5–5.2)
ALBUMIN/GLOB SERPL: 1.1 G/DL
ALP SERPL-CCNC: 79 U/L (ref 39–117)
ALT SERPL W P-5'-P-CCNC: 7 U/L (ref 1–33)
ANION GAP SERPL CALCULATED.3IONS-SCNC: 12.2 MMOL/L (ref 5–15)
AST SERPL-CCNC: 10 U/L (ref 1–32)
BACTERIA UR QL AUTO: ABNORMAL /HPF
BASOPHILS # BLD AUTO: 0.04 10*3/MM3 (ref 0–0.2)
BASOPHILS NFR BLD AUTO: 0.4 % (ref 0–1.5)
BILIRUB SERPL-MCNC: 0.4 MG/DL (ref 0–1.2)
BILIRUB UR QL STRIP: NEGATIVE
BUN SERPL-MCNC: 14 MG/DL (ref 8–23)
BUN/CREAT SERPL: 16.1 (ref 7–25)
CALCIUM SPEC-SCNC: 9 MG/DL (ref 8.6–10.5)
CHLORIDE SERPL-SCNC: 99 MMOL/L (ref 98–107)
CLARITY UR: ABNORMAL
CO2 SERPL-SCNC: 25.8 MMOL/L (ref 22–29)
COLOR UR: YELLOW
CREAT SERPL-MCNC: 0.87 MG/DL (ref 0.57–1)
D-LACTATE SERPL-SCNC: 1.9 MMOL/L (ref 0.5–2)
DEPRECATED RDW RBC AUTO: 40.9 FL (ref 37–54)
EGFRCR SERPLBLD CKD-EPI 2021: 68.7 ML/MIN/1.73
EOSINOPHIL # BLD AUTO: 0.11 10*3/MM3 (ref 0–0.4)
EOSINOPHIL NFR BLD AUTO: 1.1 % (ref 0.3–6.2)
ERYTHROCYTE [DISTWIDTH] IN BLOOD BY AUTOMATED COUNT: 13.1 % (ref 12.3–15.4)
GLOBULIN UR ELPH-MCNC: 3 GM/DL
GLUCOSE SERPL-MCNC: 147 MG/DL (ref 65–99)
GLUCOSE UR STRIP-MCNC: NEGATIVE MG/DL
HCT VFR BLD AUTO: 43.8 % (ref 34–46.6)
HGB BLD-MCNC: 14.7 G/DL (ref 12–15.9)
HGB UR QL STRIP.AUTO: ABNORMAL
HOLD SPECIMEN: NORMAL
HOLD SPECIMEN: NORMAL
HYALINE CASTS UR QL AUTO: ABNORMAL /LPF
IMM GRANULOCYTES # BLD AUTO: 0.07 10*3/MM3 (ref 0–0.05)
IMM GRANULOCYTES NFR BLD AUTO: 0.7 % (ref 0–0.5)
KETONES UR QL STRIP: NEGATIVE
LEUKOCYTE ESTERASE UR QL STRIP.AUTO: ABNORMAL
LIPASE SERPL-CCNC: 13 U/L (ref 13–60)
LYMPHOCYTES # BLD AUTO: 0.87 10*3/MM3 (ref 0.7–3.1)
LYMPHOCYTES NFR BLD AUTO: 8.9 % (ref 19.6–45.3)
MCH RBC QN AUTO: 29.1 PG (ref 26.6–33)
MCHC RBC AUTO-ENTMCNC: 33.6 G/DL (ref 31.5–35.7)
MCV RBC AUTO: 86.6 FL (ref 79–97)
MONOCYTES # BLD AUTO: 0.83 10*3/MM3 (ref 0.1–0.9)
MONOCYTES NFR BLD AUTO: 8.5 % (ref 5–12)
NEUTROPHILS NFR BLD AUTO: 7.86 10*3/MM3 (ref 1.7–7)
NEUTROPHILS NFR BLD AUTO: 80.4 % (ref 42.7–76)
NITRITE UR QL STRIP: NEGATIVE
NRBC BLD AUTO-RTO: 0 /100 WBC (ref 0–0.2)
PH UR STRIP.AUTO: 7.5 [PH] (ref 5–8)
PLATELET # BLD AUTO: 356 10*3/MM3 (ref 140–450)
PMV BLD AUTO: 10.3 FL (ref 6–12)
POTASSIUM SERPL-SCNC: 3.5 MMOL/L (ref 3.5–5.2)
PROT SERPL-MCNC: 6.3 G/DL (ref 6–8.5)
PROT UR QL STRIP: ABNORMAL
RBC # BLD AUTO: 5.06 10*6/MM3 (ref 3.77–5.28)
RBC # UR STRIP: ABNORMAL /HPF
REF LAB TEST METHOD: ABNORMAL
SODIUM SERPL-SCNC: 137 MMOL/L (ref 136–145)
SP GR UR STRIP: 1.01 (ref 1–1.03)
SQUAMOUS #/AREA URNS HPF: ABNORMAL /HPF
UROBILINOGEN UR QL STRIP: ABNORMAL
WBC # UR STRIP: ABNORMAL /HPF
WBC NRBC COR # BLD: 9.78 10*3/MM3 (ref 3.4–10.8)
WHOLE BLOOD HOLD COAG: NORMAL
WHOLE BLOOD HOLD SPECIMEN: NORMAL

## 2023-10-27 PROCEDURE — 96374 THER/PROPH/DIAG INJ IV PUSH: CPT

## 2023-10-27 PROCEDURE — 25810000003 SODIUM CHLORIDE 0.9 % SOLUTION: Performed by: EMERGENCY MEDICINE

## 2023-10-27 PROCEDURE — 83605 ASSAY OF LACTIC ACID: CPT

## 2023-10-27 PROCEDURE — 25010000002 ONDANSETRON PER 1 MG: Performed by: EMERGENCY MEDICINE

## 2023-10-27 PROCEDURE — 99284 EMERGENCY DEPT VISIT MOD MDM: CPT

## 2023-10-27 PROCEDURE — 85025 COMPLETE CBC W/AUTO DIFF WBC: CPT

## 2023-10-27 PROCEDURE — 83690 ASSAY OF LIPASE: CPT

## 2023-10-27 PROCEDURE — 81001 URINALYSIS AUTO W/SCOPE: CPT | Performed by: EMERGENCY MEDICINE

## 2023-10-27 PROCEDURE — 80053 COMPREHEN METABOLIC PANEL: CPT

## 2023-10-27 PROCEDURE — 74176 CT ABD & PELVIS W/O CONTRAST: CPT

## 2023-10-27 RX ORDER — TRAMADOL HYDROCHLORIDE 50 MG/1
50 TABLET ORAL ONCE
Status: COMPLETED | OUTPATIENT
Start: 2023-10-27 | End: 2023-10-27

## 2023-10-27 RX ORDER — ONDANSETRON 2 MG/ML
4 INJECTION INTRAMUSCULAR; INTRAVENOUS ONCE
Status: COMPLETED | OUTPATIENT
Start: 2023-10-27 | End: 2023-10-27

## 2023-10-27 RX ORDER — SODIUM CHLORIDE 0.9 % (FLUSH) 0.9 %
10 SYRINGE (ML) INJECTION AS NEEDED
Status: DISCONTINUED | OUTPATIENT
Start: 2023-10-27 | End: 2023-10-28 | Stop reason: HOSPADM

## 2023-10-27 RX ADMIN — SODIUM CHLORIDE 1000 ML: 9 INJECTION, SOLUTION INTRAVENOUS at 20:24

## 2023-10-27 RX ADMIN — ONDANSETRON 4 MG: 2 INJECTION INTRAMUSCULAR; INTRAVENOUS at 20:24

## 2023-10-27 RX ADMIN — TRAMADOL HYDROCHLORIDE 50 MG: 50 TABLET ORAL at 20:24

## 2023-10-28 VITALS
HEART RATE: 69 BPM | HEIGHT: 66 IN | SYSTOLIC BLOOD PRESSURE: 123 MMHG | OXYGEN SATURATION: 94 % | TEMPERATURE: 98.2 F | WEIGHT: 129.85 LBS | DIASTOLIC BLOOD PRESSURE: 48 MMHG | RESPIRATION RATE: 18 BRPM | BODY MASS INDEX: 20.87 KG/M2

## 2023-10-28 RX ORDER — TRAMADOL HYDROCHLORIDE 50 MG/1
50 TABLET ORAL ONCE
Status: COMPLETED | OUTPATIENT
Start: 2023-10-28 | End: 2023-10-28

## 2023-10-28 RX ADMIN — TRAMADOL HYDROCHLORIDE 50 MG: 50 TABLET, COATED ORAL at 03:50

## 2023-10-28 NOTE — ED PROVIDER NOTES
Time: 11:35 PM EDT  Date of encounter:  10/27/2023  Independent Historian/Clinical History and Information was obtained by:   Patient    History is limited by: N/A    Chief Complaint: Abdominal pain      History of Present Illness:  Patient is a 78 y.o. year old female who presents to the emergency department for evaluation of abdominal pain, nausea, and vomiting.  The patient reports that she has not had a bowel movement since Monday.  Patient denies fever and chills.  Patient has no chest pain or shortness of breath.  Patient has no cough hemoptysis.    HPI    Patient Care Team  Primary Care Provider: Nicolas Velasquez MD    Past Medical History:     Allergies   Allergen Reactions    Levofloxacin Other (See Comments)     TORE ROTATOR CUFF  Other reaction(s): Rotator cuff tear arthropathy      Propofol GI Intolerance    Dilaudid [Hydromorphone] Other (See Comments), Mental Status Change and Hallucinations     Dilaudid causes the patient to hallucinate.    Gabapentin Other (See Comments) and Myalgia    Propranolol Unknown - Low Severity    Adhesive Tape Rash    Celecoxib Unknown - High Severity and Rash     Past Medical History:   Diagnosis Date    Arthritis     Cancer     GERD (gastroesophageal reflux disease)     Hyperlipidemia     Hypertension     Kidney stone     Stroke      Past Surgical History:   Procedure Laterality Date    ABDOMINAL SURGERY      APPENDECTOMY      COLONOSCOPY      CYSTECTOMY      bladder removal, with pouch made from stomach    HERNIA REPAIR      HYSTERECTOMY      KIDNEY STONE SURGERY      TUBAL ABDOMINAL LIGATION       History reviewed. No pertinent family history.    Home Medications:  Prior to Admission medications    Medication Sig Start Date End Date Taking? Authorizing Provider   acetaminophen (TYLENOL) 500 MG tablet Take 2 tablets by mouth Every 4 (Four) Hours As Needed for Mild Pain (Max 4000 mg daily).    Provider, MD Pavel   aspirin 81 MG EC tablet Take 1 tablet by mouth  "Daily.    Pavel Sanon MD   docusate sodium (COLACE) 100 MG capsule Take 1 capsule by mouth Daily.    Pavel Sanon MD   MAGnesium-Oxide 400 (241.3 Mg) MG tablet tablet Take 0.5 tablets by mouth 2 (Two) Times a Day. 7/29/21   Pavel Sanon MD   metoprolol succinate XL (TOPROL-XL) 25 MG 24 hr tablet Take 0.5 tablets by mouth Daily.    Pavel Sanon MD   multivitamin with minerals tablet tablet Take 1 tablet by mouth Daily.    Pavel Snaon MD   pantoprazole (PROTONIX) 40 MG EC tablet Take 1 tablet by mouth As Needed. 9/29/21   Pavel Sanon MD        Social History:   Social History     Tobacco Use    Smoking status: Every Day     Packs/day: 1     Types: Cigarettes     Start date: 1/5/1966    Smokeless tobacco: Never   Vaping Use    Vaping Use: Never used   Substance Use Topics    Alcohol use: Never    Drug use: Never         Review of Systems:  Review of Systems   Constitutional:  Negative for chills and fever.   HENT:  Negative for congestion, rhinorrhea and sore throat.    Eyes:  Negative for pain and visual disturbance.   Respiratory:  Negative for apnea, cough, chest tightness and shortness of breath.    Cardiovascular:  Negative for chest pain and palpitations.   Gastrointestinal:  Positive for abdominal pain. Negative for diarrhea, nausea and vomiting.   Genitourinary:  Negative for difficulty urinating and dysuria.   Musculoskeletal:  Negative for joint swelling and myalgias.   Skin:  Negative for color change.   Neurological:  Negative for seizures and headaches.   Psychiatric/Behavioral: Negative.     All other systems reviewed and are negative.       Physical Exam:  /59   Pulse 88   Temp 98.2 °F (36.8 °C) (Oral)   Resp 18   Ht 167.6 cm (66\")   Wt 58.9 kg (129 lb 13.6 oz)   SpO2 95%   BMI 20.96 kg/m²     Physical Exam  Vitals and nursing note reviewed.   Constitutional:       General: She is not in acute distress.     Appearance: Normal " appearance. She is not toxic-appearing.   HENT:      Head: Normocephalic and atraumatic.      Jaw: There is normal jaw occlusion.   Eyes:      General: Lids are normal.      Extraocular Movements: Extraocular movements intact.      Conjunctiva/sclera: Conjunctivae normal.      Pupils: Pupils are equal, round, and reactive to light.   Cardiovascular:      Rate and Rhythm: Normal rate and regular rhythm.      Pulses: Normal pulses.      Heart sounds: Normal heart sounds.   Pulmonary:      Effort: Pulmonary effort is normal. No respiratory distress.      Breath sounds: Normal breath sounds. No wheezing or rhonchi.   Abdominal:      General: Abdomen is flat.      Palpations: Abdomen is soft.      Tenderness: There is no abdominal tenderness. There is no guarding or rebound.      Comments: (+) Nephrostomy to right flank    (+) Generalized abdominal tenderness   Musculoskeletal:         General: Normal range of motion.      Cervical back: Normal range of motion and neck supple.      Right lower leg: No edema.      Left lower leg: No edema.   Skin:     General: Skin is warm and dry.   Neurological:      Mental Status: She is alert and oriented to person, place, and time. Mental status is at baseline.   Psychiatric:         Mood and Affect: Mood normal.                  Procedures:  Procedures      Medical Decision Making:      Comorbidities that affect care:    Recent nephrostomy tube placement    External Notes reviewed:    Hospital Discharge Summary: Patient was recently admitted for ureterolithiasis      The following orders were placed and all results were independently analyzed by me:  Orders Placed This Encounter   Procedures    CT Abdomen Pelvis Without Contrast    [place] Draw    Comprehensive Metabolic Panel    Lipase    Urinalysis With Microscopic If Indicated (No Culture) - Urine, Clean Catch    Lactic Acid, Plasma    CBC Auto Differential    Urinalysis, Microscopic Only - Urine, Clean Catch    NPO Diet NPO  Type: Strict NPO    Undress & Gown    General MD Inpatient Consult    Insert Peripheral IV    CBC & Differential    Green Top (Gel)    Lavender Top    Gold Top - SST    Light Blue Top       Medications Given in the Emergency Department:  Medications   sodium chloride 0.9 % flush 10 mL (has no administration in time range)   sodium chloride 0.9 % bolus 1,000 mL (0 mL Intravenous Stopped 10/27/23 2217)   ondansetron (ZOFRAN) injection 4 mg (4 mg Intravenous Given 10/27/23 2024)   traMADol (ULTRAM) tablet 50 mg (50 mg Oral Given 10/27/23 2024)        ED Course:         Labs:    Lab Results (last 24 hours)       Procedure Component Value Units Date/Time    CBC & Differential [[id number]]  (Abnormal) Collected: 10/27/23 1800    Specimen: Blood Updated: 10/27/23 1809    Narrative:      The following orders were created for panel order CBC & Differential.  Procedure                               Abnormality         Status                     ---------                               -----------         ------                     CBC Auto Differential[[id number]]        Abnormal            Final result                 Please view results for these tests on the individual orders.    Comprehensive Metabolic Panel [[id number]]  (Abnormal) Collected: 10/27/23 1800    Specimen: Blood Updated: 10/27/23 1828     Glucose 147 mg/dL      BUN 14 mg/dL      Creatinine 0.87 mg/dL      Sodium 137 mmol/L      Potassium 3.5 mmol/L      Chloride 99 mmol/L      CO2 25.8 mmol/L      Calcium 9.0 mg/dL      Total Protein 6.3 g/dL      Albumin 3.3 g/dL      ALT (SGPT) 7 U/L      AST (SGOT) 10 U/L      Alkaline Phosphatase 79 U/L      Total Bilirubin 0.4 mg/dL      Globulin 3.0 gm/dL      A/G Ratio 1.1 g/dL      BUN/Creatinine Ratio 16.1     Anion Gap 12.2 mmol/L      eGFR 68.7 mL/min/1.73     Narrative:      GFR Normal >60  Chronic Kidney Disease <60  Kidney Failure <15    The GFR formula is only valid for adults with stable renal function between  ages 18 and 70.    Lipase [[id number]]  (Normal) Collected: 10/27/23 1800    Specimen: Blood Updated: 10/27/23 1828     Lipase 13 U/L     CBC Auto Differential [[id number]]  (Abnormal) Collected: 10/27/23 1800    Specimen: Blood Updated: 10/27/23 1809     WBC 9.78 10*3/mm3      RBC 5.06 10*6/mm3      Hemoglobin 14.7 g/dL      Hematocrit 43.8 %      MCV 86.6 fL      MCH 29.1 pg      MCHC 33.6 g/dL      RDW 13.1 %      RDW-SD 40.9 fl      MPV 10.3 fL      Platelets 356 10*3/mm3      Neutrophil % 80.4 %      Lymphocyte % 8.9 %      Monocyte % 8.5 %      Eosinophil % 1.1 %      Basophil % 0.4 %      Immature Grans % 0.7 %      Neutrophils, Absolute 7.86 10*3/mm3      Lymphocytes, Absolute 0.87 10*3/mm3      Monocytes, Absolute 0.83 10*3/mm3      Eosinophils, Absolute 0.11 10*3/mm3      Basophils, Absolute 0.04 10*3/mm3      Immature Grans, Absolute 0.07 10*3/mm3      nRBC 0.0 /100 WBC     Lactic Acid, Plasma [[id number]]  (Normal) Collected: 10/27/23 1801    Specimen: Blood Updated: 10/27/23 1824     Lactate 1.9 mmol/L     Urinalysis With Microscopic If Indicated (No Culture) - Urine, Clean Catch [[id number]]  (Abnormal) Collected: 10/27/23 2152    Specimen: Urine, Clean Catch Updated: 10/27/23 2213     Color, UA Yellow     Appearance, UA Cloudy     pH, UA 7.5     Specific Gravity, UA 1.014     Glucose, UA Negative     Ketones, UA Negative     Bilirubin, UA Negative     Blood, UA Moderate (2+)     Protein, UA Trace     Leuk Esterase, UA Trace     Nitrite, UA Negative     Urobilinogen, UA 1.0 E.U./dL    Urinalysis, Microscopic Only - Urine, Clean Catch [[id number]]  (Abnormal) Collected: 10/27/23 2152    Specimen: Urine, Clean Catch Updated: 10/27/23 2225     RBC, UA 11-20 /HPF      WBC, UA 6-10 /HPF      Bacteria, UA None Seen /HPF      Squamous Epithelial Cells, UA 0-2 /HPF      Hyaline Casts, UA 0-2 /LPF      Methodology Manual Light Microscopy             Imaging:    CT Abdomen Pelvis Without Contrast    Result Date:  10/27/2023  PROCEDURE: CT ABDOMEN PELVIS WO CONTRAST  COMPARISON: 10/2/2023.  INDICATIONS: BLOATING, NAUSEA, VOMITING, & DIARRHEA X 1 DAY; NEPHROSTOMY  REPLACED X 4 DAYS AGO.  TECHNIQUE: 607 CT images were created without intravenous or oral contrast agent administration.   PROTOCOL:   Standard CT imaging protocol performed.    RADIATION:   Total DLP: 249 mGy*cm.   Automated exposure control was utilized to minimize radiation dose.  FINDINGS: Again the patient has undergone cystectomy and urinary diversion.  There is a suspected right-sided urostomy, probably representing a loop ileostomy.  Please correlate clinically.  Dilated small bowel is identified, which contains fluid and gas.  The maximum diameter of small bowel is roughly estimated at about 4 cm.  This finding is new since the 10/2/2023 study.  The point of the transition in the small bowel caliber is not clearly demonstrated.  It is possibly related to an anastomosis from the prior surgery.  There is new or greater ascites, probably moderate in degree.  The CT number for the ascites ranges between approximately 10 and 21 Hounsfield units.  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  There is a right-sided percutaneous nephrostomy tube in place, seen previously.  There is severe right hydronephrosis with right ureterolithiasis.  There is diastasis of the rectus sheath with bowel protruding into the diastatic defect.  There has been ventral hernia repair.  No mechanical bowel obstruction is associated with this finding.  The protruding bowel in the supraumbilical diastatic defect measures approximately 7.8 x 12.7 x 3.7 cm in transverse, craniocaudal, and anteroposterior (AP) extent, respectively.  Small foci of pneumoperitoneum are seen in the superolateral right paracolic gutter and right iliac fossa.  These findings are slightly inferior to superior right iliac crest.  Differential considerations for this finding would include a small  amount of pneumoperitoneum or pneumo-retroperitoneum related to the recent procedure.  An anastomotic leak or bowel perforation cannot be excluded.  No definite free subphrenic pneumoperitoneum is seen.  There is a moderate-to-large hiatal hernia.  There is prominence of bilateral adrenal glands, which is nonspecific.  No acute pancreatitis is suggested.  The gallbladder is not clearly identified.  It may be obscured by dilated small bowel.  Please correlate with the surgical history.  The patient has undergone hysterectomy.  There has been appendectomy.         1. New dilatation of small bowel is seen with fluid and gas.  The findings may represent a mechanical bowel small bowel obstruction.  A generalized adynamic ileus is possible.  A definite transition zone is not appreciated.  Consider oral contrast agent administration and follow-up imaging evaluation for further assessment if clinically warranted.   2. There is a nephrostomy tube in place on the right.  There is severe right-sided hydronephrosis, seen previously.  Right-sided hydroureter is present.  There are small right ureteral calculi identified.  The right ureter is difficult to evaluate without contrast agent administration.   3. The patient has undergone cystectomy and urinary diversion with a right-sided urostomy.   4. New small gas foci are seen in the region of the superior, lateral right iliac fossa.  They may be related to the recent procedure.  A bowel perforation cannot be excluded.  Please correlate clinically.   5. The gallbladder is not well seen.  It may be obscured by dilated small bowel loops.   6. Dilated small bowel loops protrude into a large supraumbilical diastatic defect for which there has been ventral hernia repair.   7. Colonic diverticula are seen without acute diverticulitis.   8. Please see above comments for further detail.     Please note that portions of this note were completed with a voice recognition program.  SAMSON BROWN  LUCERO RAMESH MD       Electronically Signed and Approved By: SAMSON BARKER JR, MD on 10/27/2023 at 21:20                 Differential Diagnosis and Discussion:    Abdominal Pain: Based on the patient's signs and symptoms, I considered abdominal aortic aneurysm, small bowel obstruction, pancreatitis, acute cholecystitis, acute appendecitis, peptic ulcer disease, gastritis, colitis, endocrine disorders, irritable bowel syndrome and other differential diagnosis an etiology of the patient's abdominal pain.    All labs were reviewed and interpreted by me.  CT scan radiology impression was interpreted by me.    MDM     The patient´s CBC that was reviewed and interpreted by me shows no abnormalities of critical concern. Of note, there is no anemia requiring a blood transfusion and the platelet count is acceptable.  The patient´s CMP that was reviewed and interpretted by me shows no abnormalities of critical concern. Of note, the patient´s sodium and potassium are acceptable. The patient´s liver enzymes are unremarkable. The patient´s renal function (creatinine) is preserved. The patient has a normal anion gap.  CT scan is consistent with small bowel obstruction.  Urinalysis is negative for bacteriuria.      Critical Care Note: Total Critical Care time of 50 minutes. Total critical care time documented does not include time spent on separately billed procedures for services of nurses or physician assistants. I personally saw and examined the patient. I have reviewed all diagnostic interpretations and treatment plans as written. I was present for the key portions of any procedures performed and the inclusive time noted in any critical care statement. Critical care time includes patient management by me, time spent at the patients bedside,  time to review lab and imaging results, discussing patient care, documentation in the medical record, and time spent with family or caregiver.    Patient Care  Considerations:    None      Consultants/Shared Management Plan:    Case was discussed with urology at [facility] who agrees with transfer.  Case was discussed with general surgery who recommends transfer under the care of the hospitalist.  Case was discussed with the hospitalist team who agrees except the patient.    Social Determinants of Health:    Patient is independent, reliable, and has access to care.       Disposition and Care Coordination:    Discharged: I considered escalation of care by admitting this patient for observation, however the patient has improved and is suitable and  stable for discharge.    I have explained the patient´s condition, diagnoses and treatment plan based on the information available to me at this time. I have answered questions and addressed any concerns. The patient has a good  understanding of the patient´s diagnosis, condition, and treatment plan as can be expected at this point. The vital signs have been stable. The patient´s condition is stable and appropriate for discharge from the emergency department.      The patient will pursue further outpatient evaluation with the primary care physician or other designated or consulting physician as outlined in the discharge instructions. They are agreeable to this plan of care and follow-up instructions have been explained in detail. The patient has received these instructions in written format and have expressed an understanding of the discharge instructions. The patient is aware that any significant change in condition or worsening of symptoms should prompt an immediate return to this or the closest emergency department or call to 911.  I have explained discharge medications and the need for follow up with the patient/caretakers. This was also printed in the discharge instructions. Patient was discharged with the following medications and follow up:      Medication List      No changes were made to your prescriptions during this  visit.      No follow-up provider specified.     Final diagnoses:   Small bowel obstruction        ED Disposition       ED Disposition   Transfer to Another Facility     Condition   --    Comment   --               This medical record created using voice recognition software.             Valentina Foss MD  10/27/23 3976

## 2023-11-10 ENCOUNTER — HOSPITAL ENCOUNTER (OUTPATIENT)
Dept: NUCLEAR MEDICINE | Facility: HOSPITAL | Age: 78
Discharge: HOME | End: 2023-11-10
Payer: MEDICARE

## 2023-11-10 DIAGNOSIS — N13.1 HYDRONEPHROSIS WITH URETERAL STRICTURE: ICD-10-CM

## 2023-11-13 ENCOUNTER — HOSPITAL ENCOUNTER (OUTPATIENT)
Dept: NUCLEAR MEDICINE | Facility: HOSPITAL | Age: 78
Discharge: HOME | End: 2023-11-13
Payer: MEDICARE

## 2023-11-13 ENCOUNTER — TRANSCRIBE ORDERS (OUTPATIENT)
Dept: ADMINISTRATIVE | Facility: HOSPITAL | Age: 78
End: 2023-11-13
Payer: MEDICARE

## 2023-11-13 VITALS — HEART RATE: 97 BPM | DIASTOLIC BLOOD PRESSURE: 79 MMHG | SYSTOLIC BLOOD PRESSURE: 157 MMHG

## 2023-11-13 DIAGNOSIS — N20.2 CALCULUS OF KIDNEY AND URETER: Primary | ICD-10-CM

## 2023-11-13 PROCEDURE — 0 TECHNETIUM MERTIATIDE: Performed by: UROLOGY

## 2023-11-13 PROCEDURE — 78708 K FLOW/FUNCT IMAGE W/DRUG: CPT

## 2023-11-13 PROCEDURE — 25010000002 FUROSEMIDE PER 20 MG: Performed by: RADIOLOGY

## 2023-11-13 PROCEDURE — A9562 TC99M MERTIATIDE: HCPCS | Performed by: UROLOGY

## 2023-11-13 RX ORDER — FUROSEMIDE 10 MG/ML
20 INJECTION INTRAMUSCULAR; INTRAVENOUS ONCE
Status: CANCELLED | OUTPATIENT
Start: 2023-11-13 | End: 2023-11-13

## 2023-11-13 RX ORDER — FUROSEMIDE 10 MG/ML
17 INJECTION INTRAMUSCULAR; INTRAVENOUS ONCE
Status: COMPLETED | OUTPATIENT
Start: 2023-11-13 | End: 2023-11-13

## 2023-11-13 RX ADMIN — FUROSEMIDE 17 MG: 10 INJECTION, SOLUTION INTRAMUSCULAR; INTRAVENOUS at 09:35

## 2023-11-13 RX ADMIN — TECHNESCAN TC 99M MERTIATIDE 1 DOSE: 1 INJECTION, POWDER, LYOPHILIZED, FOR SOLUTION INTRAVENOUS at 09:25

## 2023-11-29 ENCOUNTER — HOSPITAL ENCOUNTER (OUTPATIENT)
Dept: ULTRASOUND IMAGING | Facility: HOSPITAL | Age: 78
Discharge: HOME | End: 2023-11-29
Admitting: UROLOGY
Payer: MEDICARE

## 2023-11-29 DIAGNOSIS — N20.2 CALCULUS OF KIDNEY AND URETER: ICD-10-CM

## 2023-11-29 PROCEDURE — 76775 US EXAM ABDO BACK WALL LIM: CPT

## 2024-01-23 ENCOUNTER — TRANSCRIBE ORDERS (OUTPATIENT)
Dept: ADMINISTRATIVE | Facility: HOSPITAL | Age: 79
End: 2024-01-23
Payer: MEDICARE

## 2024-01-23 DIAGNOSIS — F17.200 TOBACCO DEPENDENCE: Primary | ICD-10-CM

## 2024-02-01 ENCOUNTER — TRANSCRIBE ORDERS (OUTPATIENT)
Dept: ADMINISTRATIVE | Facility: HOSPITAL | Age: 79
End: 2024-02-01
Payer: MEDICARE

## 2024-02-01 DIAGNOSIS — Z12.31 VISIT FOR SCREENING MAMMOGRAM: Primary | ICD-10-CM

## 2024-02-05 ENCOUNTER — TRANSCRIBE ORDERS (OUTPATIENT)
Dept: ADMINISTRATIVE | Facility: HOSPITAL | Age: 79
End: 2024-02-05
Payer: MEDICARE

## 2024-02-05 DIAGNOSIS — N13.1 HYDRONEPHROSIS DUE TO URETERAL STRICTURE: Primary | ICD-10-CM

## 2024-02-20 ENCOUNTER — HOSPITAL ENCOUNTER (OUTPATIENT)
Dept: NUCLEAR MEDICINE | Facility: HOSPITAL | Age: 79
Discharge: HOME | End: 2024-02-20
Admitting: UROLOGY
Payer: MEDICARE

## 2024-02-20 VITALS — DIASTOLIC BLOOD PRESSURE: 63 MMHG | SYSTOLIC BLOOD PRESSURE: 171 MMHG | HEART RATE: 80 BPM

## 2024-02-20 DIAGNOSIS — N13.1 HYDRONEPHROSIS DUE TO URETERAL STRICTURE: ICD-10-CM

## 2024-02-20 PROCEDURE — 0 TECHNETIUM MERTIATIDE: Performed by: UROLOGY

## 2024-02-20 PROCEDURE — A9562 TC99M MERTIATIDE: HCPCS | Performed by: UROLOGY

## 2024-02-20 PROCEDURE — 25010000002 FUROSEMIDE PER 20 MG: Performed by: RADIOLOGY

## 2024-02-20 PROCEDURE — 78708 K FLOW/FUNCT IMAGE W/DRUG: CPT

## 2024-02-20 RX ORDER — FUROSEMIDE 10 MG/ML
16 INJECTION INTRAMUSCULAR; INTRAVENOUS ONCE
Status: COMPLETED | OUTPATIENT
Start: 2024-02-20 | End: 2024-02-20

## 2024-02-20 RX ADMIN — FUROSEMIDE 16 MG: 10 INJECTION, SOLUTION INTRAMUSCULAR; INTRAVENOUS at 13:20

## 2024-02-20 RX ADMIN — TECHNESCAN TC 99M MERTIATIDE 1 DOSE: 1 INJECTION, POWDER, LYOPHILIZED, FOR SOLUTION INTRAVENOUS at 13:05

## 2024-02-21 ENCOUNTER — HOSPITAL ENCOUNTER (OUTPATIENT)
Dept: CT IMAGING | Facility: HOSPITAL | Age: 79
Discharge: HOME | End: 2024-02-21
Admitting: INTERNAL MEDICINE
Payer: MEDICARE

## 2024-02-21 DIAGNOSIS — F17.200 TOBACCO DEPENDENCE: ICD-10-CM

## 2024-02-21 PROCEDURE — 71250 CT THORAX DX C-: CPT

## 2024-03-06 ENCOUNTER — TELEPHONE (OUTPATIENT)
Dept: ORTHOPEDIC SURGERY | Facility: CLINIC | Age: 79
End: 2024-03-06
Payer: MEDICARE

## 2024-03-06 NOTE — TELEPHONE ENCOUNTER
PATIENT CALLED AND IS REQUESTING WE SUBMIT FOR LEFT KNEE GEL INJECTION APPROVAL. OFFERED AN APPT FOR STEROID, PATIENT DECLINED.

## 2024-03-13 ENCOUNTER — TELEPHONE (OUTPATIENT)
Dept: ORTHOPEDIC SURGERY | Facility: CLINIC | Age: 79
End: 2024-03-13
Payer: MEDICARE

## 2024-03-13 ENCOUNTER — HOSPITAL ENCOUNTER (OUTPATIENT)
Dept: MAMMOGRAPHY | Facility: HOSPITAL | Age: 79
Discharge: HOME | End: 2024-03-13
Admitting: INTERNAL MEDICINE
Payer: MEDICARE

## 2024-03-13 DIAGNOSIS — Z12.31 VISIT FOR SCREENING MAMMOGRAM: ICD-10-CM

## 2024-03-13 PROCEDURE — 77063 BREAST TOMOSYNTHESIS BI: CPT

## 2024-03-13 PROCEDURE — 77067 SCR MAMMO BI INCL CAD: CPT

## 2024-03-13 NOTE — TELEPHONE ENCOUNTER
SHANNON CALLED BACK - CONFIRMED W/HER THAT AUTH REQUEST WAS SUBMITTED YESTERDAY, AND WE WILL CALL TO SCHEDULE ONCE AUTH IS RECEIVED - SHE EXPRESSED UNDERSTANDING

## 2024-03-13 NOTE — TELEPHONE ENCOUNTER
Tried calling patient. Wouldn't let me leave a message. We submitted yesterday for gel injection. Should be getting a call from the front office staff to make appointment soon.

## 2024-03-13 NOTE — TELEPHONE ENCOUNTER
PLEASE SEE ALREADY SIGNED TEL ENC FROM 03-06-24     PATIENT's DAUGHTER SHANNON CALLED TO CHECK STATUS OF REQUEST FOR LET KNEE GEL INJECTION &or PRIOR AUTH FROM [employer] MEDICARE REPLACEMENT (O)?    PLEASE CALL / LEAVE VMAIL TO DISCUSS / ADVISE     THANKS

## 2024-03-14 ENCOUNTER — TELEPHONE (OUTPATIENT)
Dept: ORTHOPEDIC SURGERY | Facility: CLINIC | Age: 79
End: 2024-03-14
Payer: MEDICARE

## 2024-03-14 NOTE — TELEPHONE ENCOUNTER
APPT: 3-27 AT 1:30 PM LT KNEE SYNVISC ONE INJ WITH ESTEFANIA.      LAST LT KNEE SYNVISC ONE INJ:  9-6    [employer] MEDICARE PPO

## 2024-03-14 NOTE — TELEPHONE ENCOUNTER
----- Message from [facility] sent at 3/12/2024  2:47 PM EDT -----  No PA Required for Left Knee Synvisc One.  Okay to schedule when appropriate.

## 2024-03-14 NOTE — TELEPHONE ENCOUNTER
Caller: GADIEL    Relationship: DAUGHTER    Best call back number: [phone] (home)       What is the best time to reach you: ANY    Who are you requesting to speak with (clinical staff, provider,  specific staff member): CLINICAL    Do you know the name of the person who called: GADIEL    What was the call regarding: GEL INJECTION SCHEDULED 3/27/24 @ 1:30. WOULD LIKE TO RESCHEDULE TO 3/20/24 IF POSSIBLE    Is it okay if the provider responds through MyChart: CALL

## 2024-03-20 ENCOUNTER — OFFICE VISIT (OUTPATIENT)
Dept: ORTHOPEDIC SURGERY | Facility: CLINIC | Age: 79
End: 2024-03-20
Payer: MEDICARE

## 2024-03-20 DIAGNOSIS — M17.12 PRIMARY OSTEOARTHRITIS OF LEFT KNEE: Primary | ICD-10-CM

## 2024-03-20 NOTE — PROGRESS NOTES
"Chief Complaint  Follow-up of the Left Knee    Subjective      Marnie Parra presents to [facility] for follow-up of left knee osteoarthritis she has been managing conservatively with intermittent injections.  She reports that she has pain around her kneecap and feels that \"her kneecap is going to pop off\".  Her last Synvisc injection was on 9/6/2023.  She is here today to receive another Synvisc injection.  Patient has not had updated x-rays since 2021.  She would like to update these at her next appointment.    Objective   Allergies   Allergen Reactions    Levofloxacin Other (See Comments)     TORE ROTATOR CUFF  Other reaction(s): Rotator cuff tear arthropathy      Propofol GI Intolerance    Dilaudid [Hydromorphone] Other (See Comments), Mental Status Change and Hallucinations     Dilaudid causes the patient to hallucinate.    Gabapentin Other (See Comments) and Myalgia    Propranolol Unknown - Low Severity    Adhesive Tape Rash    Celecoxib Unknown - High Severity and Rash       Vital Signs:   There were no vitals taken for this visit.      Physical Exam    Constitutional: Awake, alert. Well nourished appearance.    Integumentary: Warm, dry, intact. No obvious rashes.    HENT: Atraumatic, normocephalic.   Respiratory: Non labored respirations .   Cardiovascular: Intact peripheral pulses.    Psychiatric: Normal mood and affect. A&O X3    Ortho Exam  Left knee: Range of motion 0 to 100 degrees.  Tender to palpation the medial lateral joint line.  Neurovascular intact.  Sensation is intact.  Stable to varus and valgus stress.  Stable to anterior posterior drawer test.    Imaging Results (Most Recent)       None             Large Joint Arthrocentesis: L knee  Date/Time: 3/20/2024 2:17 PM  Consent given by: patient  Site marked: site marked  Timeout: Immediately prior to procedure a time out was called to verify the correct patient, procedure, equipment, support staff and site/side " marked as required   Supporting Documentation  Indications: pain   Procedure Details  Location: knee - L knee  Preparation: Patient was prepped and draped in the usual sterile fashion  Needle gauge: 21g.  Medications administered: 48 mg hylan 48 MG/6ML  Patient tolerance: patient tolerated the procedure well with no immediate complications              Assessment and Plan   Problem List Items Addressed This Visit          Musculoskeletal and Injuries    Primary osteoarthritis of left knee - Primary    Relevant Orders    Large Joint Arthrocentesis: L knee       Follow Up   Return if symptoms worsen or fail to improve.    Patient is a non-smoker, did not discuss options for smoking cessation.     Social History     Socioeconomic History    Marital status:    Tobacco Use    Smoking status: Every Day     Current packs/day: 1.00     Average packs/day: 1 pack/day for 58.2 years (58.2 ttl pk-yrs)     Types: Cigarettes     Start date: 1/5/1966    Smokeless tobacco: Never   Vaping Use    Vaping status: Never Used   Substance and Sexual Activity    Alcohol use: Never    Drug use: Never    Sexual activity: Defer       Patient Instructions   Patient received Synvisc injection today in office into the left knee and tolerated the procedure well without complication.  Counseled that these injections can be given every 6 months and 1 day with insurance approval. Pt can also receive steroid injections intermittently between these doses.  Steroid injections can be given every 3 months.    Follow up as needed for worsening symptoms.  Updated x-rays needed at next appointment. Call with questions, concerns, or worsening symptoms.   Patient was given instructions and counseling regarding her condition or for health maintenance advice. Please see specific information pulled into the AVS if appropriate.

## 2024-03-20 NOTE — PATIENT INSTRUCTIONS
Patient received Synvisc injection today in office into the left knee and tolerated the procedure well without complication.  Counseled that these injections can be given every 6 months and 1 day with insurance approval. Pt can also receive steroid injections intermittently between these doses.  Steroid injections can be given every 3 months.    Follow up as needed for worsening symptoms.  Updated x-rays needed at next appointment. Call with questions, concerns, or worsening symptoms.

## 2024-07-02 LAB
ALBUMIN SERPL-MCNC: 4 G/DL (ref 3.5–5.2)
ALBUMIN/GLOB SERPL: 1.7 G/DL
ALP SERPL-CCNC: 82 U/L (ref 39–117)
ALT SERPL W P-5'-P-CCNC: 12 U/L (ref 1–33)
ANION GAP SERPL CALCULATED.3IONS-SCNC: 9.8 MMOL/L (ref 5–15)
AST SERPL-CCNC: 13 U/L (ref 1–32)
BASOPHILS # BLD AUTO: 0.05 10*3/MM3 (ref 0–0.2)
BASOPHILS NFR BLD AUTO: 1 % (ref 0–1.5)
BILIRUB SERPL-MCNC: 0.2 MG/DL (ref 0–1.2)
BUN SERPL-MCNC: 18 MG/DL (ref 8–23)
BUN/CREAT SERPL: 23.4 (ref 7–25)
CALCIUM SPEC-SCNC: 8.5 MG/DL (ref 8.6–10.5)
CHLORIDE SERPL-SCNC: 108 MMOL/L (ref 98–107)
CO2 SERPL-SCNC: 22.2 MMOL/L (ref 22–29)
CREAT SERPL-MCNC: 0.77 MG/DL (ref 0.57–1)
D-LACTATE SERPL-SCNC: 0.7 MMOL/L (ref 0.5–2)
DEPRECATED RDW RBC AUTO: 50.8 FL (ref 37–54)
EGFRCR SERPLBLD CKD-EPI 2021: 79.1 ML/MIN/1.73
EOSINOPHIL # BLD AUTO: 0.08 10*3/MM3 (ref 0–0.4)
EOSINOPHIL NFR BLD AUTO: 1.5 % (ref 0.3–6.2)
ERYTHROCYTE [DISTWIDTH] IN BLOOD BY AUTOMATED COUNT: 15.7 % (ref 12.3–15.4)
GLOBULIN UR ELPH-MCNC: 2.3 GM/DL
GLUCOSE SERPL-MCNC: 101 MG/DL (ref 65–99)
HCT VFR BLD AUTO: 43.3 % (ref 34–46.6)
HGB BLD-MCNC: 13.9 G/DL (ref 12–15.9)
HOLD SPECIMEN: NORMAL
HOLD SPECIMEN: NORMAL
IMM GRANULOCYTES # BLD AUTO: 0.03 10*3/MM3 (ref 0–0.05)
IMM GRANULOCYTES NFR BLD AUTO: 0.6 % (ref 0–0.5)
LIPASE SERPL-CCNC: 36 U/L (ref 13–60)
LYMPHOCYTES # BLD AUTO: 1.07 10*3/MM3 (ref 0.7–3.1)
LYMPHOCYTES NFR BLD AUTO: 20.7 % (ref 19.6–45.3)
MCH RBC QN AUTO: 28.5 PG (ref 26.6–33)
MCHC RBC AUTO-ENTMCNC: 32.1 G/DL (ref 31.5–35.7)
MCV RBC AUTO: 88.9 FL (ref 79–97)
MONOCYTES # BLD AUTO: 0.46 10*3/MM3 (ref 0.1–0.9)
MONOCYTES NFR BLD AUTO: 8.9 % (ref 5–12)
NEUTROPHILS NFR BLD AUTO: 3.49 10*3/MM3 (ref 1.7–7)
NEUTROPHILS NFR BLD AUTO: 67.3 % (ref 42.7–76)
NRBC BLD AUTO-RTO: 0 /100 WBC (ref 0–0.2)
PLATELET # BLD AUTO: 288 10*3/MM3 (ref 140–450)
PMV BLD AUTO: 10.3 FL (ref 6–12)
POTASSIUM SERPL-SCNC: 4.2 MMOL/L (ref 3.5–5.2)
PROT SERPL-MCNC: 6.3 G/DL (ref 6–8.5)
RBC # BLD AUTO: 4.87 10*6/MM3 (ref 3.77–5.28)
SODIUM SERPL-SCNC: 140 MMOL/L (ref 136–145)
WBC NRBC COR # BLD AUTO: 5.18 10*3/MM3 (ref 3.4–10.8)
WHOLE BLOOD HOLD COAG: NORMAL
WHOLE BLOOD HOLD SPECIMEN: NORMAL

## 2024-07-02 PROCEDURE — 80053 COMPREHEN METABOLIC PANEL: CPT

## 2024-07-02 PROCEDURE — 85025 COMPLETE CBC W/AUTO DIFF WBC: CPT

## 2024-07-02 PROCEDURE — 83690 ASSAY OF LIPASE: CPT

## 2024-07-02 PROCEDURE — 99285 EMERGENCY DEPT VISIT HI MDM: CPT

## 2024-07-02 PROCEDURE — 36415 COLL VENOUS BLD VENIPUNCTURE: CPT

## 2024-07-02 PROCEDURE — 83605 ASSAY OF LACTIC ACID: CPT

## 2024-07-02 PROCEDURE — 81001 URINALYSIS AUTO W/SCOPE: CPT | Performed by: EMERGENCY MEDICINE

## 2024-07-02 RX ORDER — SODIUM CHLORIDE 0.9 % (FLUSH) 0.9 %
10 SYRINGE (ML) INJECTION AS NEEDED
Status: DISCONTINUED | OUTPATIENT
Start: 2024-07-02 | End: 2024-07-04 | Stop reason: HOSPADM

## 2024-07-03 ENCOUNTER — APPOINTMENT (OUTPATIENT)
Dept: CT IMAGING | Facility: HOSPITAL | Age: 79
End: 2024-07-03
Payer: MEDICARE

## 2024-07-03 ENCOUNTER — HOSPITAL ENCOUNTER (OUTPATIENT)
Facility: HOSPITAL | Age: 79
Setting detail: OBSERVATION
Discharge: HOME | End: 2024-07-04
Attending: EMERGENCY MEDICINE | Admitting: INTERNAL MEDICINE
Payer: MEDICARE

## 2024-07-03 ENCOUNTER — PREP FOR SURGERY (OUTPATIENT)
Dept: OTHER | Facility: HOSPITAL | Age: 79
End: 2024-07-03
Payer: MEDICARE

## 2024-07-03 ENCOUNTER — ANESTHESIA (OUTPATIENT)
Dept: GASTROENTEROLOGY | Facility: HOSPITAL | Age: 79
End: 2024-07-03
Payer: MEDICARE

## 2024-07-03 ENCOUNTER — ANESTHESIA EVENT (OUTPATIENT)
Dept: GASTROENTEROLOGY | Facility: HOSPITAL | Age: 79
End: 2024-07-03
Payer: MEDICARE

## 2024-07-03 DIAGNOSIS — K62.89 RECTAL MASS: Primary | ICD-10-CM

## 2024-07-03 DIAGNOSIS — K62.5 RECTAL BLEED: ICD-10-CM

## 2024-07-03 DIAGNOSIS — K62.5 RECTAL BLEEDING: ICD-10-CM

## 2024-07-03 LAB
ALBUMIN SERPL-MCNC: 3.3 G/DL (ref 3.5–5.2)
ALBUMIN/GLOB SERPL: 1.4 G/DL
ALP SERPL-CCNC: 75 U/L (ref 39–117)
ALT SERPL W P-5'-P-CCNC: 9 U/L (ref 1–33)
AMORPH URATE CRY URNS QL MICRO: ABNORMAL /HPF
ANION GAP SERPL CALCULATED.3IONS-SCNC: 8.5 MMOL/L (ref 5–15)
APTT PPP: 26.1 SECONDS (ref 24.2–34.2)
AST SERPL-CCNC: 12 U/L (ref 1–32)
BACTERIA UR QL AUTO: ABNORMAL /HPF
BILIRUB SERPL-MCNC: 0.3 MG/DL (ref 0–1.2)
BILIRUB UR QL STRIP: NEGATIVE
BUN SERPL-MCNC: 11 MG/DL (ref 8–23)
BUN/CREAT SERPL: 17.5 (ref 7–25)
CALCIUM SPEC-SCNC: 8.2 MG/DL (ref 8.6–10.5)
CHLORIDE SERPL-SCNC: 107 MMOL/L (ref 98–107)
CLARITY UR: CLEAR
CO2 SERPL-SCNC: 23.5 MMOL/L (ref 22–29)
COLOR UR: YELLOW
CREAT SERPL-MCNC: 0.63 MG/DL (ref 0.57–1)
DEPRECATED RDW RBC AUTO: 52.2 FL (ref 37–54)
EGFRCR SERPLBLD CKD-EPI 2021: 90.9 ML/MIN/1.73
ERYTHROCYTE [DISTWIDTH] IN BLOOD BY AUTOMATED COUNT: 15.6 % (ref 12.3–15.4)
GLOBULIN UR ELPH-MCNC: 2.3 GM/DL
GLUCOSE SERPL-MCNC: 121 MG/DL (ref 65–99)
GLUCOSE UR STRIP-MCNC: NEGATIVE MG/DL
GRAN CASTS URNS QL MICRO: ABNORMAL /LPF
HCT VFR BLD AUTO: 39.1 % (ref 34–46.6)
HGB BLD-MCNC: 12.4 G/DL (ref 12–15.9)
HGB UR QL STRIP.AUTO: NEGATIVE
HYALINE CASTS UR QL AUTO: ABNORMAL /LPF
INR PPP: 0.98 (ref 0.86–1.15)
KETONES UR QL STRIP: NEGATIVE
LEUKOCYTE ESTERASE UR QL STRIP.AUTO: NEGATIVE
MCH RBC QN AUTO: 28.7 PG (ref 26.6–33)
MCHC RBC AUTO-ENTMCNC: 31.7 G/DL (ref 31.5–35.7)
MCV RBC AUTO: 90.5 FL (ref 79–97)
MUCOUS THREADS URNS QL MICRO: ABNORMAL /HPF
NITRITE UR QL STRIP: POSITIVE
PH UR STRIP.AUTO: 7 [PH] (ref 5–8)
PLATELET # BLD AUTO: 226 10*3/MM3 (ref 140–450)
PMV BLD AUTO: 10.3 FL (ref 6–12)
POTASSIUM SERPL-SCNC: 4 MMOL/L (ref 3.5–5.2)
PROT SERPL-MCNC: 5.6 G/DL (ref 6–8.5)
PROT UR QL STRIP: NEGATIVE
PROTHROMBIN TIME: 13.2 SECONDS (ref 11.8–14.9)
RBC # BLD AUTO: 4.32 10*6/MM3 (ref 3.77–5.28)
RBC # UR STRIP: ABNORMAL /HPF
REF LAB TEST METHOD: ABNORMAL
SODIUM SERPL-SCNC: 139 MMOL/L (ref 136–145)
SP GR UR STRIP: >1.03 (ref 1–1.03)
SQUAMOUS #/AREA URNS HPF: ABNORMAL /HPF
UROBILINOGEN UR QL STRIP: ABNORMAL
WBC # UR STRIP: ABNORMAL /HPF
WBC NRBC COR # BLD AUTO: 4.74 10*3/MM3 (ref 3.4–10.8)

## 2024-07-03 PROCEDURE — 85610 PROTHROMBIN TIME: CPT | Performed by: INTERNAL MEDICINE

## 2024-07-03 PROCEDURE — 85730 THROMBOPLASTIN TIME PARTIAL: CPT | Performed by: INTERNAL MEDICINE

## 2024-07-03 PROCEDURE — G0378 HOSPITAL OBSERVATION PER HR: HCPCS

## 2024-07-03 PROCEDURE — 85027 COMPLETE CBC AUTOMATED: CPT | Performed by: INTERNAL MEDICINE

## 2024-07-03 PROCEDURE — 25810000003 LACTATED RINGERS PER 1000 ML

## 2024-07-03 PROCEDURE — 25010000002 PROPOFOL 10 MG/ML EMULSION

## 2024-07-03 PROCEDURE — A9270 NON-COVERED ITEM OR SERVICE: HCPCS | Performed by: INTERNAL MEDICINE

## 2024-07-03 PROCEDURE — 63710000001 ACETAMINOPHEN 325 MG TABLET: Performed by: INTERNAL MEDICINE

## 2024-07-03 PROCEDURE — 88305 TISSUE EXAM BY PATHOLOGIST: CPT | Performed by: INTERNAL MEDICINE

## 2024-07-03 PROCEDURE — 25010000002 GLYCOPYRROLATE 0.2 MG/ML SOLUTION

## 2024-07-03 PROCEDURE — 25510000001 IOPAMIDOL PER 1 ML: Performed by: REGISTERED NURSE

## 2024-07-03 PROCEDURE — 74177 CT ABD & PELVIS W/CONTRAST: CPT

## 2024-07-03 PROCEDURE — 80053 COMPREHEN METABOLIC PANEL: CPT | Performed by: INTERNAL MEDICINE

## 2024-07-03 PROCEDURE — 94761 N-INVAS EAR/PLS OXIMETRY MLT: CPT

## 2024-07-03 RX ORDER — BISACODYL 10 MG
10 SUPPOSITORY, RECTAL RECTAL DAILY PRN
Status: DISCONTINUED | OUTPATIENT
Start: 2024-07-03 | End: 2024-07-04 | Stop reason: HOSPADM

## 2024-07-03 RX ORDER — AMOXICILLIN 250 MG
2 CAPSULE ORAL 2 TIMES DAILY
Status: DISCONTINUED | OUTPATIENT
Start: 2024-07-03 | End: 2024-07-04 | Stop reason: HOSPADM

## 2024-07-03 RX ORDER — POLYETHYLENE GLYCOL 3350 17 G/17G
17 POWDER, FOR SOLUTION ORAL DAILY PRN
Status: DISCONTINUED | OUTPATIENT
Start: 2024-07-03 | End: 2024-07-04 | Stop reason: HOSPADM

## 2024-07-03 RX ORDER — PROPOFOL 10 MG/ML
VIAL (ML) INTRAVENOUS AS NEEDED
Status: DISCONTINUED | OUTPATIENT
Start: 2024-07-03 | End: 2024-07-03 | Stop reason: SURG

## 2024-07-03 RX ORDER — METOPROLOL SUCCINATE 50 MG/1
50 TABLET, EXTENDED RELEASE ORAL DAILY
COMMUNITY

## 2024-07-03 RX ORDER — GLYCOPYRROLATE 0.2 MG/ML
INJECTION INTRAMUSCULAR; INTRAVENOUS AS NEEDED
Status: DISCONTINUED | OUTPATIENT
Start: 2024-07-03 | End: 2024-07-03 | Stop reason: SURG

## 2024-07-03 RX ORDER — SODIUM CHLORIDE, SODIUM LACTATE, POTASSIUM CHLORIDE, CALCIUM CHLORIDE 600; 310; 30; 20 MG/100ML; MG/100ML; MG/100ML; MG/100ML
INJECTION, SOLUTION INTRAVENOUS CONTINUOUS PRN
Status: DISCONTINUED | OUTPATIENT
Start: 2024-07-03 | End: 2024-07-03 | Stop reason: SURG

## 2024-07-03 RX ORDER — EPHEDRINE SULFATE 50 MG/ML
INJECTION INTRAVENOUS AS NEEDED
Status: DISCONTINUED | OUTPATIENT
Start: 2024-07-03 | End: 2024-07-03 | Stop reason: SURG

## 2024-07-03 RX ORDER — DOCUSATE SODIUM 100 MG/1
100 CAPSULE, LIQUID FILLED ORAL DAILY
Status: DISCONTINUED | OUTPATIENT
Start: 2024-07-03 | End: 2024-07-04 | Stop reason: HOSPADM

## 2024-07-03 RX ORDER — PREGABALIN 75 MG/1
75 CAPSULE ORAL NIGHTLY
Status: ON HOLD | COMMUNITY
Start: 2024-06-24 | End: 2024-07-03

## 2024-07-03 RX ORDER — AMIODARONE HYDROCHLORIDE 200 MG/1
200 TABLET ORAL DAILY
COMMUNITY

## 2024-07-03 RX ORDER — BISACODYL 5 MG/1
5 TABLET, DELAYED RELEASE ORAL DAILY PRN
Status: DISCONTINUED | OUTPATIENT
Start: 2024-07-03 | End: 2024-07-04 | Stop reason: HOSPADM

## 2024-07-03 RX ORDER — SODIUM CHLORIDE 0.9 % (FLUSH) 0.9 %
10 SYRINGE (ML) INJECTION EVERY 12 HOURS SCHEDULED
Status: DISCONTINUED | OUTPATIENT
Start: 2024-07-03 | End: 2024-07-04 | Stop reason: HOSPADM

## 2024-07-03 RX ORDER — ALUMINA, MAGNESIA, AND SIMETHICONE 2400; 2400; 240 MG/30ML; MG/30ML; MG/30ML
15 SUSPENSION ORAL EVERY 6 HOURS PRN
Status: DISCONTINUED | OUTPATIENT
Start: 2024-07-03 | End: 2024-07-04 | Stop reason: HOSPADM

## 2024-07-03 RX ORDER — ONDANSETRON 2 MG/ML
4 INJECTION INTRAMUSCULAR; INTRAVENOUS EVERY 6 HOURS PRN
Status: DISCONTINUED | OUTPATIENT
Start: 2024-07-03 | End: 2024-07-04 | Stop reason: HOSPADM

## 2024-07-03 RX ORDER — LIDOCAINE HYDROCHLORIDE 20 MG/ML
INJECTION, SOLUTION EPIDURAL; INFILTRATION; INTRACAUDAL; PERINEURAL AS NEEDED
Status: DISCONTINUED | OUTPATIENT
Start: 2024-07-03 | End: 2024-07-03 | Stop reason: SURG

## 2024-07-03 RX ORDER — ACETAMINOPHEN 325 MG/1
650 TABLET ORAL EVERY 6 HOURS PRN
Status: DISCONTINUED | OUTPATIENT
Start: 2024-07-03 | End: 2024-07-04 | Stop reason: HOSPADM

## 2024-07-03 RX ORDER — DEXTROSE MONOHYDRATE, SODIUM CHLORIDE, AND POTASSIUM CHLORIDE 50; 1.49; 4.5 G/1000ML; G/1000ML; G/1000ML
75 INJECTION, SOLUTION INTRAVENOUS CONTINUOUS
Status: DISCONTINUED | OUTPATIENT
Start: 2024-07-03 | End: 2024-07-04 | Stop reason: HOSPADM

## 2024-07-03 RX ORDER — SODIUM CHLORIDE 9 MG/ML
40 INJECTION, SOLUTION INTRAVENOUS AS NEEDED
Status: DISCONTINUED | OUTPATIENT
Start: 2024-07-03 | End: 2024-07-04 | Stop reason: HOSPADM

## 2024-07-03 RX ORDER — AMIODARONE HYDROCHLORIDE 200 MG/1
200 TABLET ORAL DAILY
Status: DISCONTINUED | OUTPATIENT
Start: 2024-07-03 | End: 2024-07-04 | Stop reason: HOSPADM

## 2024-07-03 RX ORDER — METOPROLOL SUCCINATE 25 MG/1
25 TABLET, EXTENDED RELEASE ORAL DAILY
Status: DISCONTINUED | OUTPATIENT
Start: 2024-07-03 | End: 2024-07-04 | Stop reason: HOSPADM

## 2024-07-03 RX ORDER — SODIUM CHLORIDE 0.9 % (FLUSH) 0.9 %
10 SYRINGE (ML) INJECTION AS NEEDED
Status: DISCONTINUED | OUTPATIENT
Start: 2024-07-03 | End: 2024-07-04 | Stop reason: HOSPADM

## 2024-07-03 RX ADMIN — Medication 10 ML: at 20:12

## 2024-07-03 RX ADMIN — GLYCOPYRROLATE 0.1 MG: 0.2 INJECTION INTRAMUSCULAR; INTRAVENOUS at 14:52

## 2024-07-03 RX ADMIN — EPHEDRINE SULFATE 15 MG: 50 INJECTION INTRAVENOUS at 15:09

## 2024-07-03 RX ADMIN — IOPAMIDOL 100 ML: 755 INJECTION, SOLUTION INTRAVENOUS at 02:23

## 2024-07-03 RX ADMIN — SODIUM CHLORIDE, POTASSIUM CHLORIDE, SODIUM LACTATE AND CALCIUM CHLORIDE: 600; 310; 30; 20 INJECTION, SOLUTION INTRAVENOUS at 14:48

## 2024-07-03 RX ADMIN — PROPOFOL 200 MCG/KG/MIN: 10 INJECTION, EMULSION INTRAVENOUS at 14:53

## 2024-07-03 RX ADMIN — LIDOCAINE HYDROCHLORIDE 60 MG: 20 INJECTION, SOLUTION INTRAVENOUS at 14:52

## 2024-07-03 RX ADMIN — ACETAMINOPHEN 650 MG: 325 TABLET ORAL at 22:24

## 2024-07-03 RX ADMIN — Medication 10 ML: at 13:43

## 2024-07-03 RX ADMIN — POTASSIUM CHLORIDE, DEXTROSE MONOHYDRATE AND SODIUM CHLORIDE 75 ML/HR: 150; 5; 450 INJECTION, SOLUTION INTRAVENOUS at 09:17

## 2024-07-03 RX ADMIN — PROPOFOL 50 MG: 10 INJECTION, EMULSION INTRAVENOUS at 14:52

## 2024-07-03 NOTE — ED PROVIDER NOTES
Time: 10:56 PM EDT  Date of encounter:  7/2/2024  Independent Historian/Clinical History and Information was obtained by:   Patient    History is limited by: N/A    Chief Complaint   Patient presents with    Rectal Bleeding         History of Present Illness:  Patient is a 79 y.o. year old female who presents to the emergency department companied by family for evaluation of intermittent rectal bleeding for about 2 months now.  Patient initially thought that she had hemorrhoids but now she feels like it is coming more from her inside.  Patient is very concerned about a possible cancer, reports a history of bladder cancer.  JUDIE Dominguez    Patient Care Team  Primary Care Provider: Nicolas Velasquez MD    Past Medical History:     Allergies   Allergen Reactions    Levofloxacin Other (See Comments)     TORE ROTATOR CUFF  Other reaction(s): Rotator cuff tear arthropathy      Propofol GI Intolerance    Dilaudid [Hydromorphone] Other (See Comments), Mental Status Change and Hallucinations     Dilaudid causes the patient to hallucinate.    Gabapentin Other (See Comments) and Myalgia    Propranolol Unknown - Low Severity    Adhesive Tape Rash    Celecoxib Unknown - High Severity and Rash     Past Medical History:   Diagnosis Date    Arthritis     Cancer     GERD (gastroesophageal reflux disease)     Hyperlipidemia     Hypertension     Kidney stone     Stroke      Past Surgical History:   Procedure Laterality Date    ABDOMINAL SURGERY      APPENDECTOMY      COLONOSCOPY      CYSTECTOMY      bladder removal, with pouch made from stomach    HERNIA REPAIR      HYSTERECTOMY      KIDNEY STONE SURGERY      TUBAL ABDOMINAL LIGATION       History reviewed. No pertinent family history.    Home Medications:  Prior to Admission medications    Medication Sig Start Date End Date Taking? Authorizing Provider   acetaminophen (TYLENOL) 500 MG tablet Take 2 tablets by mouth Every 4 (Four) Hours As Needed for Mild Pain (Max 4000 mg daily).     "Pavel Sanon MD   aspirin 81 MG EC tablet Take 1 tablet by mouth Daily.    Pavel Sanon MD   docusate sodium (COLACE) 100 MG capsule Take 1 capsule by mouth Daily.  Patient not taking: Reported on 3/20/2024    Pavel Sanon MD   MAGnesium-Oxide 400 (241.3 Mg) MG tablet tablet Take 0.5 tablets by mouth 2 (Two) Times a Day. 7/29/21   Pavel Sanon MD   metoprolol succinate XL (TOPROL-XL) 25 MG 24 hr tablet Take 0.5 tablets by mouth Daily.    Pavel Sanon MD   multivitamin with minerals tablet tablet Take 1 tablet by mouth Daily.    Pavel Sanon MD   pantoprazole (PROTONIX) 40 MG EC tablet Take 1 tablet by mouth As Needed. 9/29/21   Pavel Sanon MD        Social History:   Social History     Tobacco Use    Smoking status: Every Day     Current packs/day: 1.00     Average packs/day: 1 pack/day for 58.5 years (58.5 ttl pk-yrs)     Types: Cigarettes     Start date: 1/5/1966    Smokeless tobacco: Never   Vaping Use    Vaping status: Never Used   Substance Use Topics    Alcohol use: Never    Drug use: Never         Review of Systems:  Review of Systems   Constitutional:  Negative for chills and fever.   HENT:  Negative for congestion, ear pain and sore throat.    Eyes:  Negative for pain.   Respiratory:  Negative for cough, chest tightness and shortness of breath.    Cardiovascular:  Negative for chest pain.   Gastrointestinal:  Positive for anal bleeding. Negative for abdominal pain, diarrhea, nausea and vomiting.   Genitourinary:  Negative for flank pain and hematuria.   Musculoskeletal:  Negative for joint swelling.   Skin:  Negative for pallor.   Neurological:  Negative for seizures and headaches.   All other systems reviewed and are negative.       Physical Exam:  /71 (BP Location: Left arm, Patient Position: Lying)   Pulse 63   Temp 98.1 °F (36.7 °C) (Oral)   Resp 16   Ht 167.6 cm (66\")   Wt 59.4 kg (130 lb 15.3 oz)   SpO2 97%   BMI 21.14 kg/m² "         Physical Exam  HENT:      Head: Normocephalic.      Mouth/Throat:      Mouth: Mucous membranes are moist.   Eyes:      Pupils: Pupils are equal, round, and reactive to light.   Pulmonary:      Effort: Pulmonary effort is normal.   Abdominal:      General: There is no distension.   Genitourinary:     Comments: Rectal bleeding with clots, Rectal mass felt with digital exam  Musculoskeletal:      Cervical back: Neck supple.   Skin:     General: Skin is warm and dry.   Neurological:      General: No focal deficit present.      Mental Status: She is alert and oriented to person, place, and time.   Psychiatric:         Mood and Affect: Mood normal.         Behavior: Behavior normal.                      Procedures:  Procedures      Medical Decision Making:      Comorbidities that affect care:    GERD, Cancer, Hypertension    External Notes reviewed:    Previous Clinic Note: Patient seen by orthopedic surgery on 3/20/2024 for osteoarthritis of left knee.      The following orders were placed and all results were independently analyzed by me:  Orders Placed This Encounter   Procedures    CT Abdomen Pelvis With Contrast    [place] Draw    Comprehensive Metabolic Panel    Lipase    Urinalysis With Microscopic If Indicated (No Culture) - Urine, Clean Catch    Lactic Acid, Plasma    CBC Auto Differential    Urinalysis, Microscopic Only - Urine, Clean Catch    NPO Diet NPO Type: Strict NPO    Undress & Gown    IP General Consult (Use specialty-specific consult if known)    Insert Peripheral IV    Initiate Observation Status    CBC & Differential    Green Top (Gel)    Lavender Top    Gold Top - SST    Light Blue Top       Medications Given in the Emergency Department:  Medications   sodium chloride 0.9 % flush 10 mL (has no administration in time range)   iopamidol (ISOVUE-370) 76 % injection 100 mL (100 mL Intravenous Given 7/3/24 0223)        ED Course:    The patient was initially evaluated in the triage area where  orders were placed. The patient was later dispositioned by Mack Padilla MD.      The patient was advised to stay for completion of workup which includes but is not limited to communication of labs and radiological results, reassessment and plan. The patient was advised that leaving prior to disposition by a provider could result in critical findings that are not communicated to the patient.          Labs:    Lab Results (last 24 hours)       Procedure Component Value Units Date/Time    CBC & Differential [[id number]]  (Abnormal) Collected: 07/02/24 2300    Specimen: Blood from Arm, Right Updated: 07/02/24 2309    Narrative:      The following orders were created for panel order CBC & Differential.  Procedure                               Abnormality         Status                     ---------                               -----------         ------                     CBC Auto Differential[[id number]]        Abnormal            Final result                 Please view results for these tests on the individual orders.    Comprehensive Metabolic Panel [[id number]]  (Abnormal) Collected: 07/02/24 2300    Specimen: Blood from Arm, Right Updated: 07/02/24 2333     Glucose 101 mg/dL      BUN 18 mg/dL      Creatinine 0.77 mg/dL      Sodium 140 mmol/L      Potassium 4.2 mmol/L      Chloride 108 mmol/L      CO2 22.2 mmol/L      Calcium 8.5 mg/dL      Total Protein 6.3 g/dL      Albumin 4.0 g/dL      ALT (SGPT) 12 U/L      AST (SGOT) 13 U/L      Alkaline Phosphatase 82 U/L      Total Bilirubin 0.2 mg/dL      Globulin 2.3 gm/dL      A/G Ratio 1.7 g/dL      BUN/Creatinine Ratio 23.4     Anion Gap 9.8 mmol/L      eGFR 79.1 mL/min/1.73     Narrative:      GFR Normal >60  Chronic Kidney Disease <60  Kidney Failure <15    The GFR formula is only valid for adults with stable renal function between ages 18 and 70.    Lipase [[id number]]  (Normal) Collected: 07/02/24 2300    Specimen: Blood from Arm, Right Updated: 07/02/24 2333      Lipase 36 U/L     Urinalysis With Microscopic If Indicated (No Culture) - Urine, Clean Catch [[id number]]  (Abnormal) Collected: 07/02/24 2300    Specimen: Urine, Clean Catch Updated: 07/03/24 0431     Color, UA Yellow     Appearance, UA Clear     pH, UA 7.0     Specific Gravity, UA >1.030     Glucose, UA Negative     Ketones, UA Negative     Bilirubin, UA Negative     Blood, UA Negative     Protein, UA Negative     Leuk Esterase, UA Negative     Nitrite, UA Positive     Urobilinogen, UA 0.2 E.U./dL    Lactic Acid, Plasma [[id number]]  (Normal) Collected: 07/02/24 2300    Specimen: Blood from Arm, Right Updated: 07/02/24 2329     Lactate 0.7 mmol/L     CBC Auto Differential [[id number]]  (Abnormal) Collected: 07/02/24 2300    Specimen: Blood from Arm, Right Updated: 07/02/24 2309     WBC 5.18 10*3/mm3      RBC 4.87 10*6/mm3      Hemoglobin 13.9 g/dL      Hematocrit 43.3 %      MCV 88.9 fL      MCH 28.5 pg      MCHC 32.1 g/dL      RDW 15.7 %      RDW-SD 50.8 fl      MPV 10.3 fL      Platelets 288 10*3/mm3      Neutrophil % 67.3 %      Lymphocyte % 20.7 %      Monocyte % 8.9 %      Eosinophil % 1.5 %      Basophil % 1.0 %      Immature Grans % 0.6 %      Neutrophils, Absolute 3.49 10*3/mm3      Lymphocytes, Absolute 1.07 10*3/mm3      Monocytes, Absolute 0.46 10*3/mm3      Eosinophils, Absolute 0.08 10*3/mm3      Basophils, Absolute 0.05 10*3/mm3      Immature Grans, Absolute 0.03 10*3/mm3      nRBC 0.0 /100 WBC     Urinalysis, Microscopic Only - Urine, Clean Catch [[id number]] Collected: 07/02/24 2300    Specimen: Urine, Clean Catch Updated: 07/03/24 0427             Imaging:    CT Abdomen Pelvis With Contrast    Result Date: 7/3/2024  CT ABDOMEN PELVIS W CONTRAST-  Date of exam: 7/3/2024, 1:51 A.M.  Indication: Rectal bleeding (for 3 days).  Comparisons: 10/23/2023; 10/2/2023; 2/17/2023; 8/2/2021.  TECHNIQUE: Axial CT images were obtained of the abdomen and pelvis following the uneventful intravenous administration  of 100 mL of Isovue-370 contrast agent. Reconstructed 2D (two-dimensional) coronal and sagittal images were also obtained. Automated exposure control and iterative construction methods were used. No oral contrast agent was administered for the study.  FINDINGS: Again the patient has undergone cystectomy and urinary diversion. A right-sided urostomy is suspected (as with a suspected loop ileostomy). Please correlate with the surgical history. The previously seen right-sided percutaneous nephrostomy tube has been removed. There is overall less distention of bowel than on the prior study, especially small bowel. There is abnormal thickening and enhancement of the rectosigmoid colonic wall, which is nonspecific. This finding is especially seen along the left aspect of the rectum. Again, there is prominent mural enhancement and irregular thickening in this region. It is difficult to measure but is roughly estimated at 3.3 x 1.9 x 3.7 cm in anterior-posterior (AP), transverse (TR), and craniocaudal (CC) extent, respectively, as seen on image 165 of series 201, image 128 of series 203, image 86 of series 202, and adjacent images. The finding is nonspecific. It may be related to an age-indeterminate infectious/inflammatory process (such as coloproctitis or diverticulitis). 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 Please correlate clinically. Consider colonoscopy for further assessment if not recently performed. There is no definite pericolonic or perirectal fluid collection identified to suggest an abscess. No stercoral ulceration. No pneumoperitoneum or pneumatosis. No pneumoretroperitoneum is seen. No portal or mesenteric venous gas is seen to suggest ischemic coloproctitis. The previously described extraluminal ectopic gas foci in the right abdomen, either within the  retroperitoneum or the peritoneum, seen laterally, are no longer appreciated. Overall, there is a moderate stool burden. Constipation is possible.  Additionally, there may be renal cortical scarring bilaterally, greater on the right than the left. There is mild hydronephrosis bilaterally. Incidental right-sided age-indeterminate ureteritis/pyelitis cannot be excluded. Please correlate with pertinent lab values. No ureterolithiasis is seen. There is nonobstructing nephrolithiasis on the right with a 3 mm lower pole calyceal stone. No definite nonobstructing nephrolithiasis on the left. There is prominence of the bilateral adrenal glands, which is nonspecific, and unchanged. The previously seen ascites has decreased in degree if not resolved completely. There has been ventral hernia repair, seen previously. No definite gallstones or acute cholecystitis. No acute pancreatitis. Atherosclerotic changes are present. No aneurysmal dilatation of the aortoiliac arterial system is seen. No convincing conventional CT evidence for acute or chronic mesenteric ischemia. There are degenerative changes of the imaged spine. There are suspected old left-sided pelvic fractures. The partially imaged lung bases are clear of acute infiltrate.        1. The study is ABNORMAL. New irregular mural thickening of the left posterolateral distal rectosigmoid colon is seen. The finding is specific but may represent an infectious/inflammatory process (such as age-indeterminate coloproctitis). A malignant process cannot be excluded (with a possible 3.7 cm eccentric left posterolateral mucosal mass). Consider colonoscopy for further assessment if clinically warranted. No pneumoperitoneum or pneumatosis. No mechanical bowel obstruction is suspected. No pericolonic or perirectal fluid collection is seen to suggest abscess or fistula.  2. There may be age-indeterminate right-sided ureteritis/pyelitis. No definite acute pyelonephritis is suggested. No  obstructing ureteral calculus is appreciated. Mild hydronephrosis is suspected, especially on the right.  3. No definite enlarged intra-abdominal or intrapelvic lymph nodes are seen.  4. Otherwise, no acute findings are seen in the abdomen or pelvis by enhanced CT examination.  5. Consider Nuclear Medicine (NM) whole-body FDG-PET-CT scan for further imaging evaluation if clinically indicated.  6. The other findings are as detailed above.     Please note that portions of this note were completed with a voice recognition program.   Electronically Signed By-Theodore Matta MD On:7/3/2024 2:50 AM         Differential Diagnosis and Discussion:      GI Bleeding: Differential diagnosis includes but is not limited to gastritis, gastric ulcer, stress ulcer, duodenitis, Krystin-Pete tears, esophageal varices, angiodysplasia, aortic enteric fistula, hematologic issues including thrombocytopenia, GI neoplasm, ulcerative colitis, Crohn's disease, diverticulosis, diverticulitis, hemorrhoids, aortic aneurysm, and polyps    All labs were reviewed and interpreted by me.  CT scan radiology impression was interpreted by me.    MDM  Number of Diagnoses or Management Options  Rectal bleeding  Rectal mass  Diagnosis management comments: Patient presented to the emergency department with rectal bleeding.  Labs showed no significant abnormality.  Hemoglobin 13.9.  Patient does have continuous bleeding with some clots.  She has a rectal mass felt with digital exam.  CT was obtained that showed thickening of the left posterior lateral distal rectosigmoid colon findings could indicate infectious/inflammatory process but cannot exclude malignant process.  Patient was discussed with hospitalist and she will be admitted for further care.       Amount and/or Complexity of Data Reviewed  Clinical lab tests: reviewed  Tests in the radiology section of CPT®: reviewed  Review and summarize past medical records: yes  Independent visualization of  images, tracings, or specimens: yes    Risk of Complications, Morbidity, and/or Mortality  Presenting problems: moderate  Management options: moderate                 Patient Care Considerations:    SEPSIS was considered but is NOT present in the emergency department as SIRS criteria is not present.      Consultants/Shared Management Plan:    Hospitalist: I have discussed the case with Dr Garcia who agrees to accept the patient for admission.    Social Determinants of Health:    Patient is independent, reliable, and has access to care.       Disposition and Care Coordination:    Admit:   Through independent evaluation of the patient's history, physical, and imperical data, the patient meets criteria for inpatient admission to the hospital.        Final diagnoses:   Rectal mass   Rectal bleeding        ED Disposition       ED Disposition   Decision to Admit    Condition   --    Comment   Level of Care: Med/Surg [1]   Diagnosis: Rectal mass [[id number]]   Admitting Physician: JACKIE COREA [[id number]]   Attending Physician: JACKIE CROEA [[id number]]                 This medical record created using voice recognition software.             Mack Padilla MD  07/03/24 0443

## 2024-07-03 NOTE — ANESTHESIA PREPROCEDURE EVALUATION
Anesthesia Evaluation     Patient summary reviewed and Nursing notes reviewed   NPO Solid Status: > 8 hours  NPO Liquid Status: > 2 hours           Airway   Mallampati: I  TM distance: >3 FB  Neck ROM: full  No difficulty expected  Dental - normal exam     Pulmonary - normal exam   Cardiovascular - normal exam  Exercise tolerance: poor (<4 METS)    Patient on routine beta blocker    (+) hypertension    ROS comment: Hx of Afib    Neuro/Psych  (+) CVA  GI/Hepatic/Renal/Endo    (+) GERD, GI bleeding lower , renal disease-    Musculoskeletal     Abdominal  - normal exam   Substance History      OB/GYN          Other   arthritis,   history of cancer    ROS/Med Hx Other: Echo 12/8/23:   Normal left ventricle intracavitary chamber size.   Calculated left ventricular ejection fraction of 80 % (Biplane Duran's   method). Hyperdynamic left ventricular systolic function. LV diastolic dysfunction ( Impaired relaxation). The right ventricle is normal in size and function.                       Anesthesia Plan    ASA 3     general   total IV anesthesia  (Total IV Anesthesia    Patient understands anesthesia not responsible for dental damage.  )  intravenous induction     Anesthetic plan, risks, benefits, and alternatives have been provided, discussed and informed consent has been obtained with: patient and child.    Plan discussed with CRNA.        CODE STATUS:

## 2024-07-03 NOTE — SIGNIFICANT NOTE
07/03/24 1104   Plan   Plan CCM, RN met with patient to introduce self and assess possible discharge needs.  Patient reports lives with her son, Zeus and daughter Bessie.  Reports family drives her to appointments.  Patient is able to provide own ADL's.  Reports good support from family.  Reports no need for home health at this time but will agree if MD reports need.  Reports no financial needs.  PCP, Dr. Jo, and facesheet verified.  Patient plans to return home with family and discharge but will continue to follow if home health or DME is needed.  [facility] at discharge.

## 2024-07-03 NOTE — H&P
[facility]   HISTORY AND PHYSICAL    Patient Name: Marnie Parra  : 1945  MRN: [id number]  Primary Care Physician:  Nicolas Velasquez MD  Date of admission: 7/3/2024    Subjective   Subjective     Chief Complaint: Patient is a known to have history of bladder cancer with radical cystectomy and ilial loop and [place] pouch she has been having some GI bleeding but became worse last night and she came to the emergency room with a CT scan shows abnormal rectosigmoid area with possible malignancy I have discussed the case with Dr. Phelps for possible colonoscopy    HPI: With history of bladder cancer with GI bleeding and anemia    Marnie Parra is a 79 y.o. female abnormal CT scan of the rectosigmoid    Review of Systems   All systems were reviewed and negative except for: Has been reviewed    Personal History     Past Medical History:   Diagnosis Date    Arthritis     Cancer     GERD (gastroesophageal reflux disease)     Hyperlipidemia     Hypertension     Kidney stone     Stroke        Past Surgical History:   Procedure Laterality Date    ABDOMINAL SURGERY      APPENDECTOMY      COLONOSCOPY      CYSTECTOMY      bladder removal, with pouch made from stomach    HERNIA REPAIR      HYSTERECTOMY      KIDNEY STONE SURGERY      TUBAL ABDOMINAL LIGATION         Family History: family history is not on file. Otherwise pertinent FHx was reviewed and not pertinent to current issue.    Social History:  reports that she has been smoking cigarettes. She started smoking about 58 years ago. She has a 58.5 pack-year smoking history. She has never used smokeless tobacco. She reports that she does not drink alcohol and does not use drugs.    Home Medications:  acetaminophen, amiodarone, aspirin, docusate sodium, magnesium oxide, metoprolol succinate XL, multivitamin with minerals, pantoprazole, and pregabalin      Allergies:  Allergies   Allergen Reactions    Levofloxacin Other (See Comments)     TORE ROTATOR CUFF  Other  reaction(s): Rotator cuff tear arthropathy      Propofol GI Intolerance    Dilaudid [Hydromorphone] Other (See Comments), Mental Status Change and Hallucinations     Dilaudid causes the patient to hallucinate.    Gabapentin Other (See Comments) and Myalgia    Propranolol Unknown - Low Severity    Adhesive Tape Rash    Celecoxib Unknown - High Severity and Rash       Objective   Objective     Vitals:   Temp:  [97.9 °F (36.6 °C)-98.5 °F (36.9 °C)] 97.9 °F (36.6 °C)  Heart Rate:  [60-69] 60  Resp:  [15-18] 18  BP: (116-162)/(50-92) 139/54  Physical Exam    Constitutional: Alert oriented not in acute distress   Eyes: Pupils equal reacting to light and accommodation   HENT: Normal   Neck: No palpable lymphadenopathy   Respiratory: Rales or rhonchi   Cardiovascular: S1-S2 normal no S3 or S4   Gastrointestinal: Lower quadrant ileal loop ileostomy   Musculoskeletal: Normal   Neurologic: No localizing signs  Skin: No rash    Result Review    Result Review:  I have personally reviewed the results from the time of this admission to 7/3/2024 08:30 EDT and agree with these findings:  [x]  Laboratory  []  Microbiology  [x]  Radiology  []  EKG/Telemetry   []  Cardiology/Vascular   []  Pathology  []  Old records  []  Other:  Most notable findings include: Has been reviewed and discussed    Assessment & Plan   Assessment / Plan     Brief Patient Summary:  Marnie Parra is a 79 y.o. female who patient with rectal bleeding and abnormal CT scan of the rectosigmoid    Active Hospital Problems:  Active Hospital Problems    Diagnosis     **Rectal mass        Plan:   GI consultation    VTE Prophylaxis:  Mechanical VTE prophylaxis orders are signed & held.          CODE STATUS:         Admission Status:  I believe this patient meets observation status.    Electronically signed by Nicolas Velasquez MD, 07/03/24, 8:30 AM EDT.      Part of this note may be an electronic transcription/translation of spoken language to printed text using the  Dragon Dictation System.

## 2024-07-03 NOTE — H&P (VIEW-ONLY)
[facility]  Initial Inpatient Consult Note    Referring Provider: Hospitalist    Reason for Consultation: Abnormal CT scan of the rectosigmoid    Subjective     History of present illness:    79 y.o. female with a past medical history of bladder cancer with radical cystectomy and ileal loop and [place] pouch in 2016, GERD, hypertension, and stroke presented to the emergency department due to bright red blood per rectum.  Patient states that yesterday she started to have bright red blood per rectum in her diaper and felt as though she had a hemorrhoid.  Patient also states she had the feeling of needing to use the restroom, but no did not really need to.  Patient denies abdominal pain, nausea, vomiting, melena, and fevers.    Patient states her last colonoscopy was 10+ years ago by Dr. Young where they found polyps.  Unable to locate report    Patient had CT Abdomen/Pelvis today 07/03/2024  1. The study is ABNORMAL. New irregular mural thickening of the left  posterolateral distal rectosigmoid colon is seen. The finding is  specific but may represent an infectious/inflammatory process (such as  age-indeterminate coloproctitis). A malignant process cannot be excluded  (with a possible 3.7 cm eccentric left posterolateral mucosal mass).  Consider colonoscopy for further assessment if clinically warranted. No  pneumoperitoneum or pneumatosis. No mechanical bowel obstruction is  suspected. No pericolonic or perirectal fluid collection is seen to  suggest abscess or fistula.  2. There may be age-indeterminate right-sided ureteritis/pyelitis. No  definite acute pyelonephritis is suggested. No obstructing ureteral  calculus is appreciated. Mild hydronephrosis is suspected, especially on  the right.   3. No definite enlarged intra-abdominal or intrapelvic lymph nodes are  seen.   4. Otherwise, no acute findings are seen in the abdomen or pelvis by  enhanced CT examination.  5. Consider Nuclear  Medicine (NM) whole-body FDG-PET-CT scan for further  imaging evaluation if clinically indicated.    Past Medical History:  Past Medical History:   Diagnosis Date    Arthritis     Cancer     GERD (gastroesophageal reflux disease)     Hyperlipidemia     Hypertension     Kidney stone     Stroke      Past Surgical History:  Past Surgical History:   Procedure Laterality Date    ABDOMINAL SURGERY      APPENDECTOMY      COLONOSCOPY      CYSTECTOMY      bladder removal, with pouch made from stomach    HERNIA REPAIR      HYSTERECTOMY      KIDNEY STONE SURGERY      TUBAL ABDOMINAL LIGATION        Social History:   Social History     Tobacco Use    Smoking status: Every Day     Current packs/day: 1.00     Average packs/day: 1 pack/day for 58.5 years (58.5 ttl pk-yrs)     Types: Cigarettes     Start date: 1/5/1966    Smokeless tobacco: Never   Substance Use Topics    Alcohol use: Never      Family History:  History reviewed. No pertinent family history.    Home Meds:  Medications Prior to Admission   Medication Sig Dispense Refill Last Dose    acetaminophen (TYLENOL) 500 MG tablet Take 2 tablets by mouth Every 4 (Four) to 6 (Six) Hours As Needed for Mild Pain (Max 4000 mg daily).       amiodarone (PACERONE) 200 MG tablet Take 1 tablet by mouth Daily.   7/2/2024    metoprolol succinate XL (TOPROL-XL) 50 MG 24 hr tablet Take 1 tablet by mouth Daily.   7/2/2024    multivitamin with minerals tablet tablet Take 1 tablet by mouth Daily.   7/2/2024    pantoprazole (PROTONIX) 40 MG EC tablet Take 1 tablet by mouth As Needed.        Current Meds:   amiodarone, 200 mg, Oral, Daily  docusate sodium, 100 mg, Oral, Daily  magnesium oxide, 400 mg, Oral, Daily  metoprolol succinate XL, 25 mg, Oral, Daily      Allergies:  Allergies   Allergen Reactions    Levofloxacin Other (See Comments)     TORE ROTATOR CUFF  Other reaction(s): Rotator cuff tear arthropathy      Propofol GI Intolerance    Dilaudid [Hydromorphone] Other (See Comments),  Mental Status Change and Hallucinations     Dilaudid causes the patient to hallucinate.    Gabapentin Other (See Comments) and Myalgia    Propranolol Unknown - Low Severity    Adhesive Tape Rash    Celecoxib Unknown - High Severity and Rash     Review of Systems  Pertinent items are noted in HPI     Objective     Vital Signs  Temp:  [97.7 °F (36.5 °C)-98.5 °F (36.9 °C)] 97.7 °F (36.5 °C)  Heart Rate:  [60-69] 63  Resp:  [15-18] 16  BP: (116-162)/(50-92) 152/62  Physical Exam:  General Appearance:    Alert, cooperative, in no acute distress   Head:    Normocephalic, without obvious abnormality, atraumatic   Eyes:          conjunctivae and sclerae normal, no icterus   Throat:   no thrush, oral mucosa moist   Neck:   Supple, no adenopathy   Lungs:     Unlabored breathing     Heart:    Regular rhythm and normal rate    Chest Wall:    No abnormalities observed   Abdomen:     Soft, non distended, non tender   Extremities:   no edema, no redness   Skin:   No bruising or rash   Psychiatric:  normal mood and insight     Results Review:   I reviewed the patient's new clinical results.    Results from last 7 days   Lab Units 07/02/24  2300   WBC 10*3/mm3 5.18   HEMOGLOBIN g/dL 13.9   HEMATOCRIT % 43.3   PLATELETS 10*3/mm3 288     Results from last 7 days   Lab Units 07/02/24  2300   SODIUM mmol/L 140   POTASSIUM mmol/L 4.2   CHLORIDE mmol/L 108*   CO2 mmol/L 22.2   BUN mg/dL 18   CREATININE mg/dL 0.77   CALCIUM mg/dL 8.5*   BILIRUBIN mg/dL 0.2   ALK PHOS U/L 82   ALT (SGPT) U/L 12   AST (SGOT) U/L 13   GLUCOSE mg/dL 101*     Results from last 7 days   Lab Units 07/03/24  0844   INR  0.98     Lab Results   Lab Value Date/Time    LIPASE 36 07/02/2024 2300    LIPASE 13 10/27/2023 1800    LIPASE 33 10/02/2023 1312    LIPASE 23 10/03/2022 1744    LIPASE 25 02/23/2019 1748       Radiology:  CT Abdomen Pelvis With Contrast    Result Date: 7/3/2024   1. The study is ABNORMAL. New irregular mural thickening of the left posterolateral  distal rectosigmoid colon is seen. The finding is specific but may represent an infectious/inflammatory process (such as age-indeterminate coloproctitis). A malignant process cannot be excluded (with a possible 3.7 cm eccentric left posterolateral mucosal mass). Consider colonoscopy for further assessment if clinically warranted. No pneumoperitoneum or pneumatosis. No mechanical bowel obstruction is suspected. No pericolonic or perirectal fluid collection is seen to suggest abscess or fistula.  2. There may be age-indeterminate right-sided ureteritis/pyelitis. No definite acute pyelonephritis is suggested. No obstructing ureteral calculus is appreciated. Mild hydronephrosis is suspected, especially on the right.  3. No definite enlarged intra-abdominal or intrapelvic lymph nodes are seen.  4. Otherwise, no acute findings are seen in the abdomen or pelvis by enhanced CT examination.  5. Consider Nuclear Medicine (NM) whole-body FDG-PET-CT scan for further imaging evaluation if clinically indicated.  6. The other findings are as detailed above.     Please note that portions of this note were completed with a voice recognition program.   Electronically Signed By-Theodore Matta MD On:7/3/2024 2:50 AM        Assessment & Plan     Rectal mass    Rectal bleed       Assessment:  Abnormal CT Scan Abdomen/Pelvis  Rectal bleeding      Plan:  Discussed with patient about performing flexible sigmoidoscopy to further evaluate abnormal CT scan-patient is agreeable  Will order tapwater enema to be completed prior to flex sig  Continue to keep patient n.p.o. until after procedure is performed  Patient understands and agrees to the plan      I discussed the patients findings and my recommendations with patient and nursing staff.    Electronically signed by JUDIE Grant, 07/03/24, 10:43 AM EDT.    Pt seen and CT results reviewed.  Will plan for flex sig for further eval.    Attending Attestation  I reviewed the below  documentation and evaluation and discussed the care plan with JUDIE Grant, I agree with her findings and plan as documented.    Electronically signed by Darius Andujar MD, 07/03/24, 2:00 PM EDT.    Portions of this documentation were transcribed electronically from a voice recognition software.  I confirm all data accurately represents the service(s) I performed at today's visit.

## 2024-07-03 NOTE — PLAN OF CARE
Goal Outcome Evaluation:  Plan of Care Reviewed With: patient, family        Progress: no change  Outcome Evaluation: VSS, rectal bleeding progressively worsened over last several hrs compared to intermittently the past couple weeks, [place] pouch urostomy with self cath, awaiting to see MD, OBS for further testing/imaging, continue plan of acre.  Erin Quezada RN

## 2024-07-03 NOTE — PLAN OF CARE
Goal Outcome Evaluation:              Outcome Evaluation: Flex sigmoidoscopy today, biopsies. Patient self caths [place] pouch urostomy. Pain controlled. Regular diet.

## 2024-07-03 NOTE — ANESTHESIA POSTPROCEDURE EVALUATION
Patient: Marnie Parra    Procedure Summary       Date: 07/03/24 Room / Location: [facility] ENDOSCOPY 2 / [facility]    Anesthesia Start: 1448 Anesthesia Stop: 1521    Procedure: FLEXIBLE SIGMOIDOSCOPY TO TRANSVERSE COLON WITH BIOPSIES Diagnosis:       Rectal mass      Rectal bleed      (Rectal mass [K62.89])      (Rectal bleed [K62.5])    Surgeons: Darius Andujar MD Provider: Ade Metcalf CRNA    Anesthesia Type: general ASA Status: 3            Anesthesia Type: general    Vitals  Vitals Value Taken Time   /55 07/03/24 1526   Temp 36.1 °C (97 °F) 07/03/24 1521   Pulse 87 07/03/24 1531   Resp 16 07/03/24 1526   SpO2 97 % 07/03/24 1531   Vitals shown include unfiled device data.        Post Anesthesia Care and Evaluation    Patient location during evaluation: bedside  Patient participation: complete - patient participated  Level of consciousness: awake  Pain management: adequate    Airway patency: patent  Anesthetic complications: No anesthetic complications  PONV Status: controlled  Cardiovascular status: acceptable and stable  Respiratory status: acceptable and room air

## 2024-07-03 NOTE — CONSULTS
[facility] ([facility]  Initial Inpatient Consult Note    Referring Provider: Hospitalist    Reason for Consultation: Abnormal CT scan of the rectosigmoid    Subjective     History of present illness:    79 y.o. female with a past medical history of bladder cancer with radical cystectomy and ileal loop and [place] pouch in 2016, GERD, hypertension, and stroke presented to the emergency department due to bright red blood per rectum.  Patient states that yesterday she started to have bright red blood per rectum in her diaper and felt as though she had a hemorrhoid.  Patient also states she had the feeling of needing to use the restroom, but no did not really need to.  Patient denies abdominal pain, nausea, vomiting, melena, and fevers.    Patient states her last colonoscopy was 10+ years ago by Dr. Young where they found polyps.  Unable to locate report    Patient had CT Abdomen/Pelvis today 07/03/2024  1. The study is ABNORMAL. New irregular mural thickening of the left  posterolateral distal rectosigmoid colon is seen. The finding is  specific but may represent an infectious/inflammatory process (such as  age-indeterminate coloproctitis). A malignant process cannot be excluded  (with a possible 3.7 cm eccentric left posterolateral mucosal mass).  Consider colonoscopy for further assessment if clinically warranted. No  pneumoperitoneum or pneumatosis. No mechanical bowel obstruction is  suspected. No pericolonic or perirectal fluid collection is seen to  suggest abscess or fistula.  2. There may be age-indeterminate right-sided ureteritis/pyelitis. No  definite acute pyelonephritis is suggested. No obstructing ureteral  calculus is appreciated. Mild hydronephrosis is suspected, especially on  the right.   3. No definite enlarged intra-abdominal or intrapelvic lymph nodes are  seen.   4. Otherwise, no acute findings are seen in the abdomen or pelvis by  enhanced CT examination.  5. Consider Nuclear  Medicine (NM) whole-body FDG-PET-CT scan for further  imaging evaluation if clinically indicated.    Past Medical History:  Past Medical History:   Diagnosis Date    Arthritis     Cancer     GERD (gastroesophageal reflux disease)     Hyperlipidemia     Hypertension     Kidney stone     Stroke      Past Surgical History:  Past Surgical History:   Procedure Laterality Date    ABDOMINAL SURGERY      APPENDECTOMY      COLONOSCOPY      CYSTECTOMY      bladder removal, with pouch made from stomach    HERNIA REPAIR      HYSTERECTOMY      KIDNEY STONE SURGERY      TUBAL ABDOMINAL LIGATION        Social History:   Social History     Tobacco Use    Smoking status: Every Day     Current packs/day: 1.00     Average packs/day: 1 pack/day for 58.5 years (58.5 ttl pk-yrs)     Types: Cigarettes     Start date: 1/5/1966    Smokeless tobacco: Never   Substance Use Topics    Alcohol use: Never      Family History:  History reviewed. No pertinent family history.    Home Meds:  Medications Prior to Admission   Medication Sig Dispense Refill Last Dose    acetaminophen (TYLENOL) 500 MG tablet Take 2 tablets by mouth Every 4 (Four) to 6 (Six) Hours As Needed for Mild Pain (Max 4000 mg daily).       amiodarone (PACERONE) 200 MG tablet Take 1 tablet by mouth Daily.   7/2/2024    metoprolol succinate XL (TOPROL-XL) 50 MG 24 hr tablet Take 1 tablet by mouth Daily.   7/2/2024    multivitamin with minerals tablet tablet Take 1 tablet by mouth Daily.   7/2/2024    pantoprazole (PROTONIX) 40 MG EC tablet Take 1 tablet by mouth As Needed.        Current Meds:   amiodarone, 200 mg, Oral, Daily  docusate sodium, 100 mg, Oral, Daily  magnesium oxide, 400 mg, Oral, Daily  metoprolol succinate XL, 25 mg, Oral, Daily      Allergies:  Allergies   Allergen Reactions    Levofloxacin Other (See Comments)     TORE ROTATOR CUFF  Other reaction(s): Rotator cuff tear arthropathy      Propofol GI Intolerance    Dilaudid [Hydromorphone] Other (See Comments),  Mental Status Change and Hallucinations     Dilaudid causes the patient to hallucinate.    Gabapentin Other (See Comments) and Myalgia    Propranolol Unknown - Low Severity    Adhesive Tape Rash    Celecoxib Unknown - High Severity and Rash     Review of Systems  Pertinent items are noted in HPI     Objective     Vital Signs  Temp:  [97.7 °F (36.5 °C)-98.5 °F (36.9 °C)] 97.7 °F (36.5 °C)  Heart Rate:  [60-69] 63  Resp:  [15-18] 16  BP: (116-162)/(50-92) 152/62  Physical Exam:  General Appearance:    Alert, cooperative, in no acute distress   Head:    Normocephalic, without obvious abnormality, atraumatic   Eyes:          conjunctivae and sclerae normal, no icterus   Throat:   no thrush, oral mucosa moist   Neck:   Supple, no adenopathy   Lungs:     Unlabored breathing     Heart:    Regular rhythm and normal rate    Chest Wall:    No abnormalities observed   Abdomen:     Soft, non distended, non tender   Extremities:   no edema, no redness   Skin:   No bruising or rash   Psychiatric:  normal mood and insight     Results Review:   I reviewed the patient's new clinical results.    Results from last 7 days   Lab Units 07/02/24  2300   WBC 10*3/mm3 5.18   HEMOGLOBIN g/dL 13.9   HEMATOCRIT % 43.3   PLATELETS 10*3/mm3 288     Results from last 7 days   Lab Units 07/02/24  2300   SODIUM mmol/L 140   POTASSIUM mmol/L 4.2   CHLORIDE mmol/L 108*   CO2 mmol/L 22.2   BUN mg/dL 18   CREATININE mg/dL 0.77   CALCIUM mg/dL 8.5*   BILIRUBIN mg/dL 0.2   ALK PHOS U/L 82   ALT (SGPT) U/L 12   AST (SGOT) U/L 13   GLUCOSE mg/dL 101*     Results from last 7 days   Lab Units 07/03/24  0844   INR  0.98     Lab Results   Lab Value Date/Time    LIPASE 36 07/02/2024 2300    LIPASE 13 10/27/2023 1800    LIPASE 33 10/02/2023 1312    LIPASE 23 10/03/2022 1744    LIPASE 25 02/23/2019 1748       Radiology:  CT Abdomen Pelvis With Contrast    Result Date: 7/3/2024   1. The study is ABNORMAL. New irregular mural thickening of the left posterolateral  distal rectosigmoid colon is seen. The finding is specific but may represent an infectious/inflammatory process (such as age-indeterminate coloproctitis). A malignant process cannot be excluded (with a possible 3.7 cm eccentric left posterolateral mucosal mass). Consider colonoscopy for further assessment if clinically warranted. No pneumoperitoneum or pneumatosis. No mechanical bowel obstruction is suspected. No pericolonic or perirectal fluid collection is seen to suggest abscess or fistula.  2. There may be age-indeterminate right-sided ureteritis/pyelitis. No definite acute pyelonephritis is suggested. No obstructing ureteral calculus is appreciated. Mild hydronephrosis is suspected, especially on the right.  3. No definite enlarged intra-abdominal or intrapelvic lymph nodes are seen.  4. Otherwise, no acute findings are seen in the abdomen or pelvis by enhanced CT examination.  5. Consider Nuclear Medicine (NM) whole-body FDG-PET-CT scan for further imaging evaluation if clinically indicated.  6. The other findings are as detailed above.     Please note that portions of this note were completed with a voice recognition program.   Electronically Signed By-Theodore Matta MD On:7/3/2024 2:50 AM        Assessment & Plan     Rectal mass    Rectal bleed       Assessment:  Abnormal CT Scan Abdomen/Pelvis  Rectal bleeding      Plan:  Discussed with patient about performing flexible sigmoidoscopy to further evaluate abnormal CT scan-patient is agreeable  Will order tapwater enema to be completed prior to flex sig  Continue to keep patient n.p.o. until after procedure is performed  Patient understands and agrees to the plan      I discussed the patients findings and my recommendations with patient and nursing staff.    Electronically signed by JUDIE Grant, 07/03/24, 10:43 AM EDT.    Pt seen and CT results reviewed.  Will plan for flex sig for further eval.    Attending Attestation  I reviewed the below  documentation and evaluation and discussed the care plan with JUDIE Grant, I agree with her findings and plan as documented.    Electronically signed by Darius Andujar MD, 07/03/24, 2:00 PM EDT.    Portions of this documentation were transcribed electronically from a voice recognition software.  I confirm all data accurately represents the service(s) I performed at today's visit.

## 2024-07-03 NOTE — ED NOTES
ED to Inpatient Report    PATIENT DEMOGRAPHICS  Marnie Parra   1945  79 y.o.  female  Allergies   Allergen Reactions    Levofloxacin Other (See Comments)     TORE ROTATOR CUFF  Other reaction(s): Rotator cuff tear arthropathy      Propofol GI Intolerance    Dilaudid [Hydromorphone] Other (See Comments), Mental Status Change and Hallucinations     Dilaudid causes the patient to hallucinate.    Gabapentin Other (See Comments) and Myalgia    Propranolol Unknown - Low Severity    Adhesive Tape Rash    Celecoxib Unknown - High Severity and Rash          07/02/24  7339   Weight: 59.4 kg (130 lb 15.3 oz)      There are no questions and answers to display.        HISTORY  Past Medical History:   Diagnosis Date    Arthritis     Cancer     GERD (gastroesophageal reflux disease)     Hyperlipidemia     Hypertension     Kidney stone     Stroke       Past Surgical History:   Procedure Laterality Date    ABDOMINAL SURGERY      APPENDECTOMY      COLONOSCOPY      CYSTECTOMY      bladder removal, with pouch made from stomach    HERNIA REPAIR      HYSTERECTOMY      KIDNEY STONE SURGERY      TUBAL ABDOMINAL LIGATION       Prior to Admission medications    Medication Sig Start Date End Date Taking? Authorizing Provider   acetaminophen (TYLENOL) 500 MG tablet Take 2 tablets by mouth Every 4 (Four) Hours As Needed for Mild Pain (Max 4000 mg daily).   Yes Pavel Sanon MD   amiodarone (PACERONE) 200 MG tablet Take 1 tablet by mouth Daily.   Yes Pavel Sanon MD   metoprolol succinate XL (TOPROL-XL) 25 MG 24 hr tablet Take 0.5 tablets by mouth Daily.   Yes Pavel Sanon MD   multivitamin with minerals tablet tablet Take 1 tablet by mouth Daily.   Yes Pavel Sanon MD   pantoprazole (PROTONIX) 40 MG EC tablet Take 1 tablet by mouth As Needed. 9/29/21  Yes Pavel Sanon MD   aspirin 81 MG EC tablet Take 1 tablet by mouth Daily.    Pavel Sanon MD   docusate sodium (COLACE) 100 MG  "capsule Take 1 capsule by mouth Daily.  Patient not taking: Reported on 3/20/2024    Pavel Sanon MD   MAGnesium-Oxide 400 (241.3 Mg) MG tablet tablet Take 0.5 tablets by mouth 2 (Two) Times a Day. 7/29/21   Pavel Sanon MD        [facility]  Chief Complaint   Patient presents with    Rectal Bleeding      Diagnosis Plan   1. Rectal mass        2. Rectal bleeding           Nicolas Velasquez MD  Vitals:    07/03/24 0344 07/03/24 0403 07/03/24 0445 07/03/24 0500   BP:  154/71 162/80 162/80   BP Location:  Left arm  Left arm   Patient Position:  Lying  Lying   Pulse:  63 69 66   Resp:  16  15   Temp:  98.1 °F (36.7 °C)  98.1 °F (36.7 °C)   TempSrc:  Oral  Oral   SpO2:  97% 97% 97%   Weight:       Height: 167.6 cm (66\")        Results for orders placed or performed during the hospital encounter of 07/03/24   Comprehensive Metabolic Panel    Specimen: Arm, Right; Blood   Result Value Ref Range    Glucose 101 (H) 65 - 99 mg/dL    BUN 18 8 - 23 mg/dL    Creatinine 0.77 0.57 - 1.00 mg/dL    Sodium 140 136 - 145 mmol/L    Potassium 4.2 3.5 - 5.2 mmol/L    Chloride 108 (H) 98 - 107 mmol/L    CO2 22.2 22.0 - 29.0 mmol/L    Calcium 8.5 (L) 8.6 - 10.5 mg/dL    Total Protein 6.3 6.0 - 8.5 g/dL    Albumin 4.0 3.5 - 5.2 g/dL    ALT (SGPT) 12 1 - 33 U/L    AST (SGOT) 13 1 - 32 U/L    Alkaline Phosphatase 82 39 - 117 U/L    Total Bilirubin 0.2 0.0 - 1.2 mg/dL    Globulin 2.3 gm/dL    A/G Ratio 1.7 g/dL    BUN/Creatinine Ratio 23.4 7.0 - 25.0    Anion Gap 9.8 5.0 - 15.0 mmol/L    eGFR 79.1 >60.0 mL/min/1.73   Lipase    Specimen: Arm, Right; Blood   Result Value Ref Range    Lipase 36 13 - 60 U/L   Urinalysis With Microscopic If Indicated (No Culture) - Urine, Clean Catch    Specimen: Urine, Clean Catch   Result Value Ref Range    Color, UA Yellow Yellow, Straw    Appearance, UA Clear Clear    pH, UA 7.0 5.0 - 8.0    Specific Gravity, UA >1.030 (H) 1.005 - 1.030    Glucose, UA Negative Negative    Ketones, UA Negative Negative "    Bilirubin, UA Negative Negative    Blood, UA Negative Negative    Protein, UA Negative Negative    Leuk Esterase, UA Negative Negative    Nitrite, UA Positive (A) Negative    Urobilinogen, UA 0.2 E.U./dL 0.2 - 1.0 E.U./dL   Lactic Acid, Plasma    Specimen: Arm, Right; Blood   Result Value Ref Range    Lactate 0.7 0.5 - 2.0 mmol/L   CBC Auto Differential    Specimen: Arm, Right; Blood   Result Value Ref Range    WBC 5.18 3.40 - 10.80 10*3/mm3    RBC 4.87 3.77 - 5.28 10*6/mm3    Hemoglobin 13.9 12.0 - 15.9 g/dL    Hematocrit 43.3 34.0 - 46.6 %    MCV 88.9 79.0 - 97.0 fL    MCH 28.5 26.6 - 33.0 pg    MCHC 32.1 31.5 - 35.7 g/dL    RDW 15.7 (H) 12.3 - 15.4 %    RDW-SD 50.8 37.0 - 54.0 fl    MPV 10.3 6.0 - 12.0 fL    Platelets 288 140 - 450 10*3/mm3    Neutrophil % 67.3 42.7 - 76.0 %    Lymphocyte % 20.7 19.6 - 45.3 %    Monocyte % 8.9 5.0 - 12.0 %    Eosinophil % 1.5 0.3 - 6.2 %    Basophil % 1.0 0.0 - 1.5 %    Immature Grans % 0.6 (H) 0.0 - 0.5 %    Neutrophils, Absolute 3.49 1.70 - 7.00 10*3/mm3    Lymphocytes, Absolute 1.07 0.70 - 3.10 10*3/mm3    Monocytes, Absolute 0.46 0.10 - 0.90 10*3/mm3    Eosinophils, Absolute 0.08 0.00 - 0.40 10*3/mm3    Basophils, Absolute 0.05 0.00 - 0.20 10*3/mm3    Immature Grans, Absolute 0.03 0.00 - 0.05 10*3/mm3    nRBC 0.0 0.0 - 0.2 /100 WBC   Urinalysis, Microscopic Only - Urine, Clean Catch    Specimen: Urine, Clean Catch   Result Value Ref Range    RBC, UA 0-2 None Seen, 0-2 /HPF    WBC, UA 6-10 (A) None Seen, 0-2 /HPF    Bacteria, UA 2+ (A) None Seen /HPF    Squamous Epithelial Cells, UA 0-2 None Seen, 0-2 /HPF    Hyaline Casts, UA 0-2 None Seen /LPF    Granular Casts, UA 3-6 None Seen /LPF    Amorphous Crystals, UA Moderate/2+ None Seen /HPF    Mucus, UA Moderate/2+ (A) None Seen, Trace /HPF    Methodology Manual Light Microscopy    Green Top (Gel)   Result Value Ref Range    Extra Tube Hold for add-ons.    Lavender Top   Result Value Ref Range    Extra Tube hold for add-on     Gold Top - SST   Result Value Ref Range    Extra Tube Hold for add-ons.    Light Blue Top   Result Value Ref Range    Extra Tube Hold for add-ons.       CT Abdomen Pelvis With Contrast   Final Result       1. The study is ABNORMAL. New irregular mural thickening of the left   posterolateral distal rectosigmoid colon is seen. The finding is   specific but may represent an infectious/inflammatory process (such as   age-indeterminate coloproctitis). A malignant process cannot be excluded   (with a possible 3.7 cm eccentric left posterolateral mucosal mass).   Consider colonoscopy for further assessment if clinically warranted. No   pneumoperitoneum or pneumatosis. No mechanical bowel obstruction is   suspected. No pericolonic or perirectal fluid collection is seen to   suggest abscess or fistula.       2. There may be age-indeterminate right-sided ureteritis/pyelitis. No   definite acute pyelonephritis is suggested. No obstructing ureteral   calculus is appreciated. Mild hydronephrosis is suspected, especially on   the right.        3. No definite enlarged intra-abdominal or intrapelvic lymph nodes are   seen.        4. Otherwise, no acute findings are seen in the abdomen or pelvis by   enhanced CT examination.       5. Consider Nuclear Medicine (NM) whole-body FDG-PET-CT scan for further   imaging evaluation if clinically indicated.       6. The other findings are as detailed above.                   Please note that portions of this note were completed with a voice   recognition program.           Electronically Signed By-Theodore Matta MD On:7/3/2024 2:50 AM            Lines, Drains & Airways       Active LDAs       Name Placement date Placement time Site Days    Peripheral IV 07/03/24 0114 Right Antecubital 07/03/24  0114  Antecubital  less than 1    Nephrostomy Right 8 Fr. 10/02/23  1716  Right  274    Urostomy [place] Pouch RLQ --  --  RLQ  --                  Medications   sodium chloride 0.9 % flush 10 mL  (has no administration in time range)   iopamidol (ISOVUE-370) 76 % injection 100 mL (100 mL Intravenous Given 7/3/24 0223)      No orders to display        Angella Leiva RN  07/03/24 05:22 EDT

## 2024-07-04 ENCOUNTER — READMISSION MANAGEMENT (OUTPATIENT)
Dept: CALL CENTER | Facility: HOSPITAL | Age: 79
End: 2024-07-04
Payer: MEDICARE

## 2024-07-04 VITALS
HEART RATE: 60 BPM | HEIGHT: 66 IN | OXYGEN SATURATION: 98 % | RESPIRATION RATE: 18 BRPM | WEIGHT: 138.89 LBS | TEMPERATURE: 98.1 F | DIASTOLIC BLOOD PRESSURE: 69 MMHG | SYSTOLIC BLOOD PRESSURE: 151 MMHG | BODY MASS INDEX: 22.32 KG/M2

## 2024-07-04 LAB
ALBUMIN SERPL-MCNC: 3.6 G/DL (ref 3.5–5.2)
ALBUMIN/GLOB SERPL: 1.6 G/DL
ALP SERPL-CCNC: 78 U/L (ref 39–117)
ALT SERPL W P-5'-P-CCNC: 9 U/L (ref 1–33)
ANION GAP SERPL CALCULATED.3IONS-SCNC: 9 MMOL/L (ref 5–15)
AST SERPL-CCNC: 11 U/L (ref 1–32)
BASOPHILS # BLD AUTO: 0.05 10*3/MM3 (ref 0–0.2)
BASOPHILS NFR BLD AUTO: 1 % (ref 0–1.5)
BILIRUB SERPL-MCNC: 0.3 MG/DL (ref 0–1.2)
BUN SERPL-MCNC: 11 MG/DL (ref 8–23)
BUN/CREAT SERPL: 15.1 (ref 7–25)
CALCIUM SPEC-SCNC: 8.7 MG/DL (ref 8.6–10.5)
CHLORIDE SERPL-SCNC: 109 MMOL/L (ref 98–107)
CO2 SERPL-SCNC: 23 MMOL/L (ref 22–29)
CREAT SERPL-MCNC: 0.73 MG/DL (ref 0.57–1)
DEPRECATED RDW RBC AUTO: 51.7 FL (ref 37–54)
EGFRCR SERPLBLD CKD-EPI 2021: 84.3 ML/MIN/1.73
EOSINOPHIL # BLD AUTO: 0.05 10*3/MM3 (ref 0–0.4)
EOSINOPHIL NFR BLD AUTO: 1 % (ref 0.3–6.2)
ERYTHROCYTE [DISTWIDTH] IN BLOOD BY AUTOMATED COUNT: 15.4 % (ref 12.3–15.4)
GLOBULIN UR ELPH-MCNC: 2.3 GM/DL
GLUCOSE SERPL-MCNC: 84 MG/DL (ref 65–99)
HCT VFR BLD AUTO: 40.7 % (ref 34–46.6)
HGB BLD-MCNC: 12.9 G/DL (ref 12–15.9)
IMM GRANULOCYTES # BLD AUTO: 0.01 10*3/MM3 (ref 0–0.05)
IMM GRANULOCYTES NFR BLD AUTO: 0.2 % (ref 0–0.5)
INR PPP: 0.91 (ref 0.86–1.15)
LYMPHOCYTES # BLD AUTO: 0.91 10*3/MM3 (ref 0.7–3.1)
LYMPHOCYTES NFR BLD AUTO: 18.8 % (ref 19.6–45.3)
MCH RBC QN AUTO: 28.8 PG (ref 26.6–33)
MCHC RBC AUTO-ENTMCNC: 31.7 G/DL (ref 31.5–35.7)
MCV RBC AUTO: 90.8 FL (ref 79–97)
MONOCYTES # BLD AUTO: 0.38 10*3/MM3 (ref 0.1–0.9)
MONOCYTES NFR BLD AUTO: 7.9 % (ref 5–12)
NEUTROPHILS NFR BLD AUTO: 3.44 10*3/MM3 (ref 1.7–7)
NEUTROPHILS NFR BLD AUTO: 71.1 % (ref 42.7–76)
NRBC BLD AUTO-RTO: 0 /100 WBC (ref 0–0.2)
PLATELET # BLD AUTO: 264 10*3/MM3 (ref 140–450)
PMV BLD AUTO: 10.5 FL (ref 6–12)
POTASSIUM SERPL-SCNC: 5 MMOL/L (ref 3.5–5.2)
PROT SERPL-MCNC: 5.9 G/DL (ref 6–8.5)
PROTHROMBIN TIME: 12.5 SECONDS (ref 11.8–14.9)
RBC # BLD AUTO: 4.48 10*6/MM3 (ref 3.77–5.28)
SODIUM SERPL-SCNC: 141 MMOL/L (ref 136–145)
WBC NRBC COR # BLD AUTO: 4.84 10*3/MM3 (ref 3.4–10.8)

## 2024-07-04 PROCEDURE — 80053 COMPREHEN METABOLIC PANEL: CPT | Performed by: INTERNAL MEDICINE

## 2024-07-04 PROCEDURE — 85610 PROTHROMBIN TIME: CPT | Performed by: INTERNAL MEDICINE

## 2024-07-04 PROCEDURE — A9270 NON-COVERED ITEM OR SERVICE: HCPCS | Performed by: INTERNAL MEDICINE

## 2024-07-04 PROCEDURE — 63710000001 METOPROLOL SUCCINATE XL 25 MG TABLET SUSTAINED-RELEASE 24 HOUR: Performed by: INTERNAL MEDICINE

## 2024-07-04 PROCEDURE — 94799 UNLISTED PULMONARY SVC/PX: CPT

## 2024-07-04 PROCEDURE — 94761 N-INVAS EAR/PLS OXIMETRY MLT: CPT

## 2024-07-04 PROCEDURE — 85025 COMPLETE CBC W/AUTO DIFF WBC: CPT | Performed by: INTERNAL MEDICINE

## 2024-07-04 PROCEDURE — 63710000001 ACETAMINOPHEN 325 MG TABLET: Performed by: INTERNAL MEDICINE

## 2024-07-04 PROCEDURE — 63710000001 AMIODARONE 200 MG TABLET: Performed by: INTERNAL MEDICINE

## 2024-07-04 PROCEDURE — G0378 HOSPITAL OBSERVATION PER HR: HCPCS

## 2024-07-04 RX ADMIN — METOPROLOL SUCCINATE 25 MG: 25 TABLET, EXTENDED RELEASE ORAL at 08:54

## 2024-07-04 RX ADMIN — ACETAMINOPHEN 650 MG: 325 TABLET ORAL at 04:27

## 2024-07-04 RX ADMIN — AMIODARONE HYDROCHLORIDE 200 MG: 200 TABLET ORAL at 08:54

## 2024-07-04 RX ADMIN — POTASSIUM CHLORIDE, DEXTROSE MONOHYDRATE AND SODIUM CHLORIDE 75 ML/HR: 150; 5; 450 INJECTION, SOLUTION INTRAVENOUS at 02:02

## 2024-07-04 NOTE — PLAN OF CARE
Goal Outcome Evaluation:  Plan of Care Reviewed With: patient        Progress: no change  Outcome Evaluation: VSS.  good output from urostomy.  had x1 small bowel movement overnight with blood/clots.  c/o sciatic pain to left leg; tylenol given per request.

## 2024-07-04 NOTE — OUTREACH NOTE
Prep Survey      Flowsheet Row Responses   [facility] facility patient discharged from? [facility]   Is LACE score < 7 ? Yes   Eligibility Not Eligible   What are the reasons patient is not eligible? Other  [Low readmission risk]   Does the patient have one of the following disease processes/diagnoses(primary or secondary)? Other   Prep survey completed? Yes            DANIEL RUIZ - Registered Nurse

## 2024-07-04 NOTE — DISCHARGE SUMMARY
[facility]         DISCHARGE SUMMARY    Patient Name: Marnie Parra  : 1945  MRN: [id number]    Date of Admission: 7/3/2024  Date of Discharge: 2024  Primary Care Physician: Nicolas Velasquez MD    Consults       Date and Time Order Name Status Description    7/3/2024  8:30 AM Inpatient Gastroenterology Consult Completed     7/3/2024  4:40 AM IP General Consult (Use specialty-specific consult if known)              Presenting Problem:   Rectal bleeding [K62.5]  Rectal mass [K62.89]    Active and Resolved Hospital Problems:  Active Hospital Problems    Diagnosis POA    **Rectal mass [K62.89] Yes    Rectal bleed [K62.5] Unknown      Resolved Hospital Problems   No resolved problems to display.         Hospital Course     Hospital Course:  Marnie Parra is a 79 y.o. female patient had colonoscopy which shows rectosigmoid mass and further workup will be done as an outpatient she is not actively bleeding blood counts are stable I have discussed the case with Dr. 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 had come to the hospital with GI bleeding      Pertinent  and/or Most Recent Results     LAB RESULTS:      Lab 24  1643 24  0844 24  2300   WBC 4.84 4.74  --  5.18   HEMOGLOBIN 12.9 12.4  --  13.9   HEMATOCRIT 40.7 39.1  --  43.3   PLATELETS 264 226  --  288   NEUTROS ABS 3.44  --   --  3.49   IMMATURE GRANS (ABS) 0.01  --   --  0.03   LYMPHS ABS 0.91  --   --  1.07   MONOS ABS 0.38  --   --  0.46   EOS ABS 0.05  --   --  0.08   MCV 90.8 90.5  --  88.9   LACTATE  --   --   --  0.7   PROTIME 12.5  --  13.2  --    APTT  --   --  26.1  --          Lab 24  1643  07/02/24  2300   SODIUM 141 139 140   POTASSIUM 5.0 4.0 4.2   CHLORIDE 109* 107 108*   CO2 23.0 23.5 22.2   ANION GAP 9.0 8.5 9.8   BUN 11 11 18   CREATININE 0.73 0.63 0.77   EGFR 84.3 90.9 79.1   GLUCOSE 84 121* 101*   CALCIUM 8.7 8.2* 8.5*         Lab 07/04/24  0425 07/03/24  1643 07/02/24  2300   TOTAL PROTEIN 5.9* 5.6* 6.3   ALBUMIN 3.6 3.3* 4.0   GLOBULIN 2.3 2.3 2.3   ALT (SGPT) 9 9 12   AST (SGOT) 11 12 13   BILIRUBIN 0.3 0.3 0.2   ALK PHOS 78 75 82   LIPASE  --   --  36         Lab 07/04/24  0425 07/03/24  0844   PROTIME 12.5 13.2   INR 0.91 0.98                 Brief Urine Lab Results  (Last result in the past 365 days)        Color   Clarity   Blood   Leuk Est   Nitrite   Protein   CREAT   Urine HCG        07/02/24 2300 Yellow   Clear   Negative   Negative   Positive   Negative                 Microbiology Results (last 10 days)       ** No results found for the last 240 hours. **            CT Abdomen Pelvis With Contrast    Result Date: 7/3/2024  Impression:  1. The study is ABNORMAL. New irregular mural thickening of the left posterolateral distal rectosigmoid colon is seen. The finding is specific but may represent an infectious/inflammatory process (such as age-indeterminate coloproctitis). A malignant process cannot be excluded (with a possible 3.7 cm eccentric left posterolateral mucosal mass). Consider colonoscopy for further assessment if clinically warranted. No pneumoperitoneum or pneumatosis. No mechanical bowel obstruction is suspected. No pericolonic or perirectal fluid collection is seen to suggest abscess or fistula.  2. There may be age-indeterminate right-sided ureteritis/pyelitis. No definite acute pyelonephritis is suggested. No obstructing ureteral calculus is appreciated. Mild hydronephrosis is suspected, especially on the right.  3. No definite enlarged intra-abdominal or intrapelvic lymph nodes are seen.  4. Otherwise, no acute findings are seen in the abdomen or pelvis by enhanced  CT examination.  5. Consider Nuclear Medicine (NM) whole-body FDG-PET-CT scan for further imaging evaluation if clinically indicated.  6. The other findings are as detailed above.     Please note that portions of this note were completed with a voice recognition program.   Electronically Signed By-Theodore Matta MD On:7/3/2024 2:50 AM                   Labs Pending at Discharge:  Pending Labs       Order Current Status    Tissue Pathology Exam Collected (07/03/24 2335)              Discharge Details        Discharge Medications        Continue These Medications        Instructions Start Date   acetaminophen 500 MG tablet  Commonly known as: TYLENOL   1,000 mg, Oral, Every 4 to 6 Hours PRN      amiodarone 200 MG tablet  Commonly known as: PACERONE   200 mg, Oral, Daily      metoprolol succinate XL 50 MG 24 hr tablet  Commonly known as: TOPROL-XL   50 mg, Oral, Daily      multivitamin with minerals tablet tablet   1 tablet, Oral, Daily      pantoprazole 40 MG EC tablet  Commonly known as: PROTONIX   40 mg, Oral, As Needed               Allergies   Allergen Reactions    Levofloxacin Other (See Comments)     TORE ROTATOR CUFF  Other reaction(s): Rotator cuff tear arthropathy      Propofol GI Intolerance    Dilaudid [Hydromorphone] Other (See Comments), Mental Status Change and Hallucinations     Dilaudid causes the patient to hallucinate.    Gabapentin Other (See Comments) and Myalgia    Propranolol Unknown - Low Severity    Adhesive Tape Rash    Celecoxib Unknown - High Severity and Rash         Discharge Disposition:  Home or Self Care    Diet:  Hospital:  Diet Order   Procedures    Diet: Regular/House; Fluid Consistency: Thin (IDDSI 0)         Discharge Activity: As tolerated        CODE STATUS:  Code Status and Medical Interventions:   Ordered at: 07/03/24 9860     Level Of Support Discussed With:    Patient     Code Status (Patient has no pulse and is not breathing):    CPR (Attempt to Resuscitate)     Medical  Interventions (Patient has pulse or is breathing):    Full Support         No future appointments.        Time spent on Discharge including face to face service: 45 minutes    Electronically signed by Nicolas Velasquez MD, 07/04/24, 1:08 PM EDT.    Part of this note may be an electronic transcription/translation of spoken language to printed text using the Dragon Dictation System.

## 2024-07-08 ENCOUNTER — TRANSCRIBE ORDERS (OUTPATIENT)
Dept: ADMINISTRATIVE | Facility: HOSPITAL | Age: 79
End: 2024-07-08
Payer: MEDICARE

## 2024-07-08 DIAGNOSIS — C20 MALIGNANT NEOPLASM OF RECTUM: Primary | ICD-10-CM

## 2024-07-08 NOTE — PROGRESS NOTES
Pt is aware of the cancer diagnosis and has surgical oncology follow up arranged for and coordinated by Dr. Jon.

## 2024-07-11 ENCOUNTER — TELEPHONE (OUTPATIENT)
Dept: GASTROENTEROLOGY | Facility: CLINIC | Age: 79
End: 2024-07-11
Payer: MEDICARE

## 2024-07-11 NOTE — TELEPHONE ENCOUNTER
Patient returned call and provided information to update JON.  (JON now on file).    Patient and son, Mr. Zeus Parra have not questions or needs at this time.  Advised to reach out at any time if we could be of any help.

## 2024-07-11 NOTE — TELEPHONE ENCOUNTER
Per Dr. Andujar:  patient is aware of diagnosis - Rectal Mass: adenocarcinoma moderately differentiated.    Patient is established with Dr. Velasquez and had appointment on 07.08.24 to discuss plan of care.  Dr. Velasquez has ordered MRI Pelvis and PT scan.  Office visit notes state also that he will place a referral to Dr. Squires at [facility].    Attempted to contact patient, left voicemail message to return call as needed. Provided direct contact information.  Mailed card.

## 2024-07-15 LAB
CYTO UR: NORMAL
LAB AP CASE REPORT: NORMAL
LAB AP CLINICAL INFORMATION: NORMAL
LAB AP DIAGNOSIS COMMENT: NORMAL
Lab: NORMAL
PATH REPORT.ADDENDUM SPEC: NORMAL
PATH REPORT.FINAL DX SPEC: NORMAL
PATH REPORT.GROSS SPEC: NORMAL

## 2024-07-16 ENCOUNTER — HOSPITAL ENCOUNTER (OUTPATIENT)
Dept: MRI IMAGING | Facility: HOSPITAL | Age: 79
Discharge: HOME | End: 2024-07-16
Admitting: INTERNAL MEDICINE
Payer: MEDICARE

## 2024-07-16 ENCOUNTER — HOSPITAL ENCOUNTER (OUTPATIENT)
Dept: PET IMAGING | Facility: HOSPITAL | Age: 79
Discharge: HOME | End: 2024-07-16
Payer: MEDICARE

## 2024-07-16 DIAGNOSIS — C20 MALIGNANT NEOPLASM OF RECTUM: ICD-10-CM

## 2024-07-16 PROCEDURE — 72197 MRI PELVIS W/O & W/DYE: CPT

## 2024-07-16 PROCEDURE — A9552 F18 FDG: HCPCS | Performed by: INTERNAL MEDICINE

## 2024-07-16 PROCEDURE — 0 GADOBENATE DIMEGLUMINE 529 MG/ML SOLUTION: Performed by: INTERNAL MEDICINE

## 2024-07-16 PROCEDURE — 0 FLUDEOXYGLUCOSE F18 SOLUTION: Performed by: INTERNAL MEDICINE

## 2024-07-16 PROCEDURE — 78815 PET IMAGE W/CT SKULL-THIGH: CPT

## 2024-07-16 PROCEDURE — A9577 INJ MULTIHANCE: HCPCS | Performed by: INTERNAL MEDICINE

## 2024-07-16 RX ADMIN — GADOBENATE DIMEGLUMINE 10 ML: 529 INJECTION, SOLUTION INTRAVENOUS at 10:28

## 2024-07-16 RX ADMIN — FLUDEOXYGLUCOSE F 18 1 DOSE: 200 INJECTION, SOLUTION INTRAVENOUS at 14:40

## 2024-07-22 ENCOUNTER — PREP FOR SURGERY (OUTPATIENT)
Dept: OTHER | Facility: HOSPITAL | Age: 79
End: 2024-07-22
Payer: MEDICARE

## 2024-07-22 ENCOUNTER — TELEPHONE (OUTPATIENT)
Dept: SURGERY | Facility: CLINIC | Age: 79
End: 2024-07-22
Payer: MEDICARE

## 2024-07-22 DIAGNOSIS — C20 RECTAL CANCER: Primary | ICD-10-CM

## 2024-07-22 NOTE — TELEPHONE ENCOUNTER
----- Message from Luis Savage sent at 7/22/2024 10:46 AM EDT -----  Dr. Velasquez contacted me about this patient.  He requested portacatheter placement this week.  The patient has rectal cancer.  Call the patient and try to get her scheduled for portacatheter placement tomorrow in osec to follow the one surgery already have their tomorrow.  Tell the patient I will meet her tomorrow before surgery and talk about the port.  Make sure the patient is not taking any blood thinners besides NSAIDs.  I already placed surgery orders.  Thank you.

## 2024-07-22 NOTE — TELEPHONE ENCOUNTER
Patient is scheduled for port placement for 07-23-24, scheduled with Sveta in surgery scheduling. Patient aware and okay with date. Voiced understanding.     Patient does not take any blood thinners per patient.

## 2024-07-23 ENCOUNTER — ANESTHESIA EVENT (OUTPATIENT)
Dept: PERIOP | Facility: HOSPITAL | Age: 79
End: 2024-07-23
Payer: MEDICARE

## 2024-07-23 ENCOUNTER — ANESTHESIA (OUTPATIENT)
Dept: PERIOP | Facility: HOSPITAL | Age: 79
End: 2024-07-23
Payer: MEDICARE

## 2024-07-23 ENCOUNTER — HOSPITAL ENCOUNTER (OUTPATIENT)
Facility: HOSPITAL | Age: 79
Setting detail: HOSPITAL OUTPATIENT SURGERY
Discharge: HOME | End: 2024-07-23
Attending: SURGERY | Admitting: SURGERY
Payer: MEDICARE

## 2024-07-23 ENCOUNTER — APPOINTMENT (OUTPATIENT)
Dept: GENERAL RADIOLOGY | Facility: HOSPITAL | Age: 79
End: 2024-07-23
Payer: MEDICARE

## 2024-07-23 VITALS
OXYGEN SATURATION: 97 % | BODY MASS INDEX: 20.98 KG/M2 | TEMPERATURE: 98.2 F | SYSTOLIC BLOOD PRESSURE: 127 MMHG | HEIGHT: 66 IN | HEART RATE: 51 BPM | WEIGHT: 130.51 LBS | DIASTOLIC BLOOD PRESSURE: 51 MMHG | RESPIRATION RATE: 16 BRPM

## 2024-07-23 DIAGNOSIS — K62.89 RECTAL MASS: Primary | ICD-10-CM

## 2024-07-23 DIAGNOSIS — C20 RECTAL CANCER: ICD-10-CM

## 2024-07-23 PROCEDURE — 25010000002 BUPIVACAINE (PF) 0.25 % SOLUTION: Performed by: SURGERY

## 2024-07-23 PROCEDURE — 36561 INSERT TUNNELED CV CATH: CPT | Performed by: SURGERY

## 2024-07-23 PROCEDURE — 25810000003 LACTATED RINGERS PER 1000 ML: Performed by: ANESTHESIOLOGY

## 2024-07-23 PROCEDURE — 77001 FLUOROGUIDE FOR VEIN DEVICE: CPT | Performed by: SURGERY

## 2024-07-23 PROCEDURE — 25010000002 HEPARIN (PORCINE) PER 1000 UNITS: Performed by: SURGERY

## 2024-07-23 PROCEDURE — 25010000002 CEFAZOLIN PER 500 MG: Performed by: SURGERY

## 2024-07-23 PROCEDURE — 71045 X-RAY EXAM CHEST 1 VIEW: CPT

## 2024-07-23 PROCEDURE — 25010000002 PROPOFOL 10 MG/ML EMULSION: Performed by: NURSE ANESTHETIST, CERTIFIED REGISTERED

## 2024-07-23 PROCEDURE — C1788 PORT, INDWELLING, IMP: HCPCS | Performed by: SURGERY

## 2024-07-23 PROCEDURE — 76000 FLUOROSCOPY <1 HR PHYS/QHP: CPT

## 2024-07-23 PROCEDURE — 25010000002 ONDANSETRON PER 1 MG

## 2024-07-23 PROCEDURE — 76937 US GUIDE VASCULAR ACCESS: CPT | Performed by: SURGERY

## 2024-07-23 PROCEDURE — 25010000002 DEXAMETHASONE PER 1 MG

## 2024-07-23 DEVICE — POWERPORT CLEARVUE ISP IMPLANTABLE PORT WITH ATTACHABLE 8F POLYURETHANE OPEN-ENDED SINGLE-LUMEN VENOUS CATHETER PROCEDURAL KIT
Type: IMPLANTABLE DEVICE | Site: CHEST | Status: FUNCTIONAL
Brand: POWERPORT CLEARVUE

## 2024-07-23 RX ORDER — HYDROCODONE BITARTRATE AND ACETAMINOPHEN 5; 325 MG/1; MG/1
1 TABLET ORAL EVERY 6 HOURS PRN
Qty: 5 TABLET | Refills: 0 | Status: SHIPPED | OUTPATIENT
Start: 2024-07-23 | End: 2024-07-23 | Stop reason: HOSPADM

## 2024-07-23 RX ORDER — TRAMADOL HYDROCHLORIDE 50 MG/1
50 TABLET ORAL EVERY 6 HOURS PRN
Status: DISCONTINUED | OUTPATIENT
Start: 2024-07-23 | End: 2024-07-23 | Stop reason: HOSPADM

## 2024-07-23 RX ORDER — TRAMADOL HYDROCHLORIDE 50 MG/1
50 TABLET ORAL EVERY 6 HOURS PRN
Status: DISCONTINUED | OUTPATIENT
Start: 2024-07-23 | End: 2024-07-23 | Stop reason: SDUPTHER

## 2024-07-23 RX ORDER — PROMETHAZINE HYDROCHLORIDE 12.5 MG/1
25 TABLET ORAL ONCE AS NEEDED
Status: DISCONTINUED | OUTPATIENT
Start: 2024-07-23 | End: 2024-07-23 | Stop reason: HOSPADM

## 2024-07-23 RX ORDER — EPHEDRINE SULFATE 50 MG/ML
INJECTION, SOLUTION INTRAVENOUS AS NEEDED
Status: DISCONTINUED | OUTPATIENT
Start: 2024-07-23 | End: 2024-07-23 | Stop reason: SURG

## 2024-07-23 RX ORDER — TRAMADOL HYDROCHLORIDE 50 MG/1
50 TABLET ORAL EVERY 6 HOURS PRN
Qty: 5 TABLET | Refills: 0 | Status: SHIPPED | OUTPATIENT
Start: 2024-07-23 | End: 2025-07-23

## 2024-07-23 RX ORDER — DEXAMETHASONE SODIUM PHOSPHATE 4 MG/ML
INJECTION, SOLUTION INTRA-ARTICULAR; INTRALESIONAL; INTRAMUSCULAR; INTRAVENOUS; SOFT TISSUE AS NEEDED
Status: DISCONTINUED | OUTPATIENT
Start: 2024-07-23 | End: 2024-07-23 | Stop reason: SURG

## 2024-07-23 RX ORDER — ONDANSETRON 2 MG/ML
INJECTION INTRAMUSCULAR; INTRAVENOUS AS NEEDED
Status: DISCONTINUED | OUTPATIENT
Start: 2024-07-23 | End: 2024-07-23 | Stop reason: SURG

## 2024-07-23 RX ORDER — MEPERIDINE HYDROCHLORIDE 25 MG/ML
12.5 INJECTION INTRAMUSCULAR; INTRAVENOUS; SUBCUTANEOUS
Status: DISCONTINUED | OUTPATIENT
Start: 2024-07-23 | End: 2024-07-23 | Stop reason: HOSPADM

## 2024-07-23 RX ORDER — SODIUM CHLORIDE, SODIUM LACTATE, POTASSIUM CHLORIDE, CALCIUM CHLORIDE 600; 310; 30; 20 MG/100ML; MG/100ML; MG/100ML; MG/100ML
9 INJECTION, SOLUTION INTRAVENOUS CONTINUOUS PRN
Status: DISCONTINUED | OUTPATIENT
Start: 2024-07-23 | End: 2024-07-23 | Stop reason: HOSPADM

## 2024-07-23 RX ORDER — PROPOFOL 10 MG/ML
VIAL (ML) INTRAVENOUS AS NEEDED
Status: DISCONTINUED | OUTPATIENT
Start: 2024-07-23 | End: 2024-07-23 | Stop reason: SURG

## 2024-07-23 RX ORDER — KETAMINE HCL IN NACL, ISO-OSM 100MG/10ML
SYRINGE (ML) INJECTION AS NEEDED
Status: DISCONTINUED | OUTPATIENT
Start: 2024-07-23 | End: 2024-07-23 | Stop reason: SURG

## 2024-07-23 RX ORDER — BUPIVACAINE HYDROCHLORIDE 2.5 MG/ML
INJECTION, SOLUTION EPIDURAL; INFILTRATION; INTRACAUDAL AS NEEDED
Status: DISCONTINUED | OUTPATIENT
Start: 2024-07-23 | End: 2024-07-23 | Stop reason: HOSPADM

## 2024-07-23 RX ORDER — LIDOCAINE HYDROCHLORIDE 20 MG/ML
INJECTION, SOLUTION EPIDURAL; INFILTRATION; INTRACAUDAL; PERINEURAL AS NEEDED
Status: DISCONTINUED | OUTPATIENT
Start: 2024-07-23 | End: 2024-07-23 | Stop reason: SURG

## 2024-07-23 RX ORDER — ONDANSETRON 2 MG/ML
4 INJECTION INTRAMUSCULAR; INTRAVENOUS ONCE AS NEEDED
Status: DISCONTINUED | OUTPATIENT
Start: 2024-07-23 | End: 2024-07-23 | Stop reason: HOSPADM

## 2024-07-23 RX ORDER — SCOLOPAMINE TRANSDERMAL SYSTEM 1 MG/1
1 PATCH, EXTENDED RELEASE TRANSDERMAL ONCE
Status: DISCONTINUED | OUTPATIENT
Start: 2024-07-23 | End: 2024-07-23 | Stop reason: HOSPADM

## 2024-07-23 RX ORDER — OXYCODONE HYDROCHLORIDE 5 MG/1
5 TABLET ORAL
Status: DISCONTINUED | OUTPATIENT
Start: 2024-07-23 | End: 2024-07-23 | Stop reason: HOSPADM

## 2024-07-23 RX ORDER — ACETAMINOPHEN 500 MG
1000 TABLET ORAL ONCE
Status: COMPLETED | OUTPATIENT
Start: 2024-07-23 | End: 2024-07-23

## 2024-07-23 RX ORDER — PROMETHAZINE HYDROCHLORIDE 25 MG/1
25 SUPPOSITORY RECTAL ONCE AS NEEDED
Status: DISCONTINUED | OUTPATIENT
Start: 2024-07-23 | End: 2024-07-23 | Stop reason: HOSPADM

## 2024-07-23 RX ORDER — MAGNESIUM HYDROXIDE 1200 MG/15ML
LIQUID ORAL AS NEEDED
Status: DISCONTINUED | OUTPATIENT
Start: 2024-07-23 | End: 2024-07-23 | Stop reason: HOSPADM

## 2024-07-23 RX ADMIN — EPHEDRINE SULFATE 10 MG: 50 INJECTION INTRAVENOUS at 14:50

## 2024-07-23 RX ADMIN — LIDOCAINE HYDROCHLORIDE 60 MG: 20 INJECTION, SOLUTION INTRAVENOUS at 14:48

## 2024-07-23 RX ADMIN — PROPOFOL 150 MCG/KG/MIN: 10 INJECTION, EMULSION INTRAVENOUS at 14:52

## 2024-07-23 RX ADMIN — DEXAMETHASONE SODIUM PHOSPHATE 4 MG: 4 INJECTION, SOLUTION INTRAMUSCULAR; INTRAVENOUS at 15:22

## 2024-07-23 RX ADMIN — SODIUM CHLORIDE, POTASSIUM CHLORIDE, SODIUM LACTATE AND CALCIUM CHLORIDE 9 ML/HR: 600; 310; 30; 20 INJECTION, SOLUTION INTRAVENOUS at 12:49

## 2024-07-23 RX ADMIN — ONDANSETRON HYDROCHLORIDE 4 MG: 2 SOLUTION INTRAMUSCULAR; INTRAVENOUS at 15:22

## 2024-07-23 RX ADMIN — SODIUM CHLORIDE 2000 MG: 9 INJECTION, SOLUTION INTRAVENOUS at 14:42

## 2024-07-23 RX ADMIN — ACETAMINOPHEN 1000 MG: 500 TABLET ORAL at 12:49

## 2024-07-23 RX ADMIN — PROPOFOL 130 MG: 10 INJECTION, EMULSION INTRAVENOUS at 14:48

## 2024-07-23 RX ADMIN — Medication 10 MG: at 14:57

## 2024-07-23 RX ADMIN — SCOPALAMINE 1 PATCH: 1 PATCH, EXTENDED RELEASE TRANSDERMAL at 12:49

## 2024-07-23 NOTE — ANESTHESIA POSTPROCEDURE EVALUATION
Patient: Marnie Parra    Procedure Summary       Date: 07/23/24 Room / Location: [facility] OR 04 / [facility] MAIN OR    Anesthesia Start: 1442 Anesthesia Stop: 1535    Procedure: INSERTION VENOUS ACCESS DEVICE (Right: Chest) Diagnosis:       Rectal cancer      (Rectal cancer [C20])    Surgeons: Luis Savage MD Provider: Lynda Wilder CRNA    Anesthesia Type: MAC, general ASA Status: 3            Anesthesia Type: MAC, general    Vitals  Vitals Value Taken Time   /42 07/23/24 1600   Temp 36.1 °C (96.9 °F) 07/23/24 1535   Pulse 53 07/23/24 1602   Resp 19 07/23/24 1540   SpO2 96 % 07/23/24 1602   Vitals shown include unfiled device data.        Post Anesthesia Care and Evaluation    Patient location during evaluation: bedside  Patient participation: complete - patient participated  Level of consciousness: awake  Pain management: adequate    Airway patency: patent  PONV Status: none  Cardiovascular status: acceptable and stable  Respiratory status: acceptable  Hydration status: acceptable

## 2024-07-23 NOTE — H&P
Chief Complaint:  No chief complaint on file.    Primary Care Provider: Nicolas Velasquez MD    History of Present Illness  Marnie Parra is a 79 y.o. female referred to have a port-a-catheter placed.    The patient was diagnosed with rectal cancer.    The patient is going to receive IV therapy and port-a-catheter placement is needed.    Allergies: Levofloxacin, Propofol, Dilaudid [hydromorphone], Gabapentin, Propranolol, Adhesive tape, and Celecoxib    Outpatient Medications Marked as Taking for the 7/23/24 encounter (Hospital Encounter)   Medication Sig Dispense Refill    amiodarone (PACERONE) 200 MG tablet Take 1 tablet by mouth Daily.      metoprolol succinate XL (TOPROL-XL) 50 MG 24 hr tablet Take 1 tablet by mouth Daily.         Past Medical History:    Arthritis    Cancer    bladder    GERD (gastroesophageal reflux disease)    Hyperlipidemia    Hypertension    Kidney stone    PONV (postoperative nausea and vomiting)    Stroke    2016 left side        Past Surgical History:    ABDOMINAL SURGERY    APPENDECTOMY    COLONOSCOPY    CYSTECTOMY    bladder removal, with pouch made from stomach    HERNIA REPAIR    HYSTERECTOMY    KIDNEY STONE SURGERY    SIGMOIDOSCOPY    Procedure: FLEXIBLE SIGMOIDOSCOPY TO TRANSVERSE COLON WITH BIOPSIES;  Surgeon: Darius Andujar MD;  Location: [facility] ENDOSCOPY;  Service: Gastroenterology;  Laterality: N/A;  RECTAL MASS, DIVERTICULOSIS    TUBAL ABDOMINAL LIGATION       Family History: History reviewed. No pertinent family history.     Social History:  Social History     Tobacco Use    Smoking status: Every Day     Current packs/day: 1.00     Average packs/day: 1 pack/day for 58.5 years (58.5 ttl pk-yrs)     Types: Cigarettes     Start date: 1/5/1966    Smokeless tobacco: Never    Tobacco comments:     Last 7/23/24   Substance Use Topics    Alcohol use: Never       Objective     Vital Signs:  /71 (BP Location: Right arm, Patient Position: Lying)   Pulse 54   Temp 97.1  "°F (36.2 °C) (Temporal)   Resp 18   Ht 167.6 cm (66\")   Wt 59.2 kg (130 lb 8.2 oz)   SpO2 99%   BMI 21.07 kg/m²   Respiratory:  breathing not labored, respiratory effort appears normal  Cardiovascular:  heart regular rate  Skin and subcutaneous tissue:  warm and dry  Musculoskeletal: moving all extremities symmetrically and purposefully  Neurologic:  no obvious motor or sensory deficits    Assessment:  Diagnoses and all orders for this visit:    1. Rectal cancer  -     ceFAZolin 2000 mg IVPB in 100 mL NS (VTB)    Other orders  -     Follow Anesthesia Guidelines / Protocol; Standing  -     Verify / Perform Chlorhexidine Skin Prep; Standing  -     Place Sequential Compression Device; Standing  -     Maintain Sequential Compression Device; Standing  -     Follow Anesthesia Guidelines / Protocol  -     Verify / Perform Chlorhexidine Skin Prep  -     Place Sequential Compression Device  -     Maintain Sequential Compression Device  -     Vital Signs - Per Anesthesia Protocol; Standing  -     Remove Scopolamine Patch 24 Hours After Application; Standing  -     Continuous Pulse Oximetry; Standing  -     Insert Peripheral IV; Standing  -     lactated ringers infusion  -     acetaminophen (TYLENOL) tablet 1,000 mg  -     scopolamine patch 1 mg/72 hr  -     Remove Scopolamine Patch 24 Hours After Application  -     Continuous Pulse Oximetry  -     Insert Peripheral IV  -     Oxygen Therapy- Nasal Cannula; Titrate 1-6 LPM Per SpO2; 90 - 95%; Standing  -     Continuous Pulse Oximetry; Standing  -     POC Glucose STAT; Standing  -     Vital Signs Every 5 Minutes for 15 Minutes, Every 15 Minutes Thereafter.; Standing  -     Apply Warming [place]; Standing  -     Suction; Standing  -     Notify Anesthesia of Any Acute Changes in Patient Condition; Standing  -     Notify Anesthesia for Unrelieved Pain; Standing  -     oxyCODONE (ROXICODONE) immediate release tablet 5 mg  -     HYDROmorphone (DILAUDID) injection 0.5 mg  -     " ondansetron (ZOFRAN) injection 4 mg  -     promethazine (PHENERGAN) suppository 25 mg  -     promethazine (PHENERGAN) tablet 25 mg  -     meperidine (DEMEROL) injection 12.5 mg  -     Once Discharge Criteria to Floor Met, Follow Surgeon Orders; Standing  -     Discharge Patient From PACU When Discharge Criteria Met; Standing  -     HYDROmorphone (DILAUDID) injection 0.25 mg        Plan:  Port-a-catheter placement    Discussion: Indications, options, risks, benefits, and expected outcomes of planned surgery were discussed with the patient and she agrees to proceed.    Luis Savage MD  07/23/2024    Electronically signed by Luis Savage MD, 07/23/24, 3:01 PM EDT.

## 2024-07-23 NOTE — ANESTHESIA PREPROCEDURE EVALUATION
Anesthesia Evaluation     Patient summary reviewed and Nursing notes reviewed   history of anesthetic complications:  PONV  NPO Solid Status: > 8 hours  NPO Liquid Status: > 2 hours           Airway   Mallampati: I  TM distance: >3 FB  Neck ROM: full  No difficulty expected  Dental - normal exam     Pulmonary - normal exam    breath sounds clear to auscultation  (+) a smoker Current, cigarettes,  Cardiovascular - normal exam  Exercise tolerance: good (4-7 METS)    Patient on routine beta blocker and Beta blocker given within 24 hours of surgery  Rhythm: regular  Rate: normal    (+) hypertension (metoprolol, amiodarone) 2 medications or greater, dysrhythmias (history of two episodes of afib after surgery, both episodes occuring on post op day 2), hyperlipidemia    ROS comment: Echo (12/9/23):  Normal left ventricle intracavitary chamber size.  Calculated left ventricular ejection fraction of 80 % (Biplane Duran's  method).  Hyperdynamic left ventricular systolic function.  LV diastolic dysfunction ( Impaired relaxation ).  The right ventricle is normal in size and function.          Neuro/Psych  (+) CVA  GI/Hepatic/Renal/Endo    (+) GERD well controlled, GI bleeding , renal disease-    Musculoskeletal     Abdominal  - normal exam   Substance History - negative use     OB/GYN negative ob/gyn ROS         Other   arthritis,   history of cancer    ROS/Med Hx Other: PAT Nursing Notes unavailable.                     Anesthesia Plan    ASA 3     MAC and general     (Patient understands anesthesia not responsible for dental damage.)  intravenous induction     Anesthetic plan, risks, benefits, and alternatives have been provided, discussed and informed consent has been obtained with: patient and child.    Plan discussed with CRNA.        CODE STATUS:

## 2024-07-23 NOTE — OP NOTE
INSERTION VENOUS ACCESS DEVICE  Procedure Report    Patient Name:  Marnie Parra  YOB: 1945    Date of Surgery:  7/23/2024     Pre-op Diagnosis:   Rectal cancer [C20]    Pre-Op Diagnosis Codes:     * Rectal cancer [C20]       Post-op Diagnosis:   Post-Op Diagnosis Codes:     * Rectal cancer [C20]    Procedure:  Venous access device placement (CPT 57550)    Staff:  Surgeon(s):  Luis Savage MD    Assistant:  OR nurse    Anesthesia: General    Estimated Blood Loss: Minimal    Complications:  None    Drains:  None    Packing:  None    Implants:    Implant Name Type Inv. Item Serial No.  Lot No. LRB No. Used Action   KT PRT POWERPORT CLEARVUE MOD/BUMP INTERMD 8F 45CM - [id number] Implant KT PRT POWERPORT CLEARVUE MOD/BUMP INTERMD 8F 45CM  [employer] [id number] Right 1 Implanted     Specimen: None    Indications:  See my preop note.     Findings:  Bard PowerPort ClearVUE Implantable Port placed via right internal jugular vein.    Description of Procedure: Patient was taken to the operating room and placed supine on the operating table.  Timeout verification was performed. MAC anesthesia was administered.  Patient was prepped and draped in usual fashion.  Using ultrasound, the right internal jugular vein was identified and then accessed with a needle.  A guidewire was placed through the needle and the needle was withdrawn.  Fluoroscopy was used to confirm the guidewire was positioned appropriately in the superior vena cava.  A subcutaneous pocket was created at the right upper chest to accommodate the port.  The inner dilator was placed inside of the tear-away sheath and both were placed over the guidewire into the right internal jugular vein.  The inner guidewire and dilator were removed.  The catheter was placed through the tear-away sheath and the sheath was withdrawn.  The tunneler trocar was used to tunnel the catheter to the subcutaneous pocket.  Then under direct  visualization with fluoroscopy, the catheter was pulled back until the tip was positioned appropriately in the superior vena cava.  The catheter was cut to the appropriate length and the locking cap was placed onto the catheter.   The catheter was connected to the port, the locking cap was secured, and the port was placed within the subcutaneous pocket.  I accessed the port with a Walker needle.  I could easily aspirate venous blood.  The port was then flushed with heparinized solution.  The wound was closed appropriately with buried absorbable suture followed by appropriate dressings.  No complications.  Sponge needle and instrument counts were correct.  Patient was transported to the recovery area in stable condition.      Luis Savage MD     Date: 7/23/2024  Time: 15:33 EDT    Electronically signed by Luis Savage MD, 07/23/24, 3:33 PM EDT.

## 2024-07-23 NOTE — DISCHARGE INSTRUCTIONS
DISCHARGE INSTRUCTIONS  INSERTION OF   PORT-A-CATH      For your surgery you had:  Local anesthesia  Monitored Anesthesia Care  You received a medicated patch for nausea prevention today (behind your ear). It is recommended that you remove it 24-48 hours post-operatively. It must be removed within 72 hours.   You may experience dizziness, drowsiness, or light-headedness for several hours following surgery/procedure.  Do not stay alone today or tonight.  Limit your activity for 24 hours.  You should not drive, operate machinery, drink alcohol, or sign legally binding documents for 24 hours or while you are taking pain medication.  Resume your diet slowly.  Follow whatever special dietary instructions you may have been given by your doctor.    NOTIFY YOUR DOCTOR IF YOU EXPERIENCE ANY OF THE FOLLOWING:  Temperature greater than 101 degrees Fahrenheit  Shaking Chills  Redness or excessive drainage from incision  Nausea, vomiting and/opr pain that is not controlled by prescribed medications  Increase in bleeding or bleeding that is excessive  Unable to urinate in 6 hours after surgery  If unable to reach your doctor, please go to the closest Emergency Room [facility]  Skin adhesive flakes off in 10-14 days.  Wash your hands and cleanse the incision daily with   Soap and Water  You may shower tomorrow.  Apply an ice pack intermittently for 20 minutes for a total of 24-48 hours.  Do not apply directly to the skin.       Port should be flushed/heparinized once a month (even when not being used).  Keep Port-A-Cath ID Card in your purse of wallet.  Medications per physician instructions as indicated on After Visit Summary Sheet.    Last dose of pain medication was given at:   Tylenol 1000mg at 12:49pm .    SPECIAL INSTRUCTIONS:  *Follow ALL of Dr. Savage's instructions.  **Continue home medications as directed by physician.  ***Do NOT take additional Tylenol while taking prescribed pain meds.      Dr. Savage's  Instructions          DIET  Resume your regular diet.     ACTIVITY  Do not lift anything greater than 15 pounds with the arm on the same side the port was placed for one week.  After one week, you have no activity restrictions and may gradually increase your activities using common sense.     WOUND CARE & SHOWERING/BATHING  Your incisions are covered with a plastic type material that will flake off on its own in the next few weeks.  If the plastic type material has not flaked off on its own by 2 weeks after your surgery then use tweezers to pull it off.  You have sutures in your incisions but they are placed below the level of the skin and they will slowly dissolve (they do not need to be removed).  The skin around your incisions will likely have some bruising.  This is normal.  You can shower beginning tomorrow but wait two weeks after your surgery before taking any tub baths.  Also, do not get in swimming pools or lake water for two weeks.     HOME MEDICATIONS  Resume all of your normal home medications.     PAIN CONTROL  You will receive a narcotic pain medicine prescription before you are discharged home.  Be sure to take the narcotic pain medication with some food so as not to upset your stomach.  Do not drive while you are taking the prescription pain medication.  Take Tylenol or Motrin for mild pain.     BOWEL MOVEMENTS  It is not unusual for narcotic pain medications to cause constipation.  Also, the medications and anesthesia you received for your surgery can have a constipating effect.  To help avoid constipation, drink at least four eight-ounce glasses of water each day and use over-the-counter laxatives/stool softeners (dulcolax, milk of magnesia, senokot, etc.).  I recommend drinking the aforementioned amount of water daily and taking 30 ml of milk of magnesia two times each day while you are using the prescribed narcotic pain medication.  If your bowel movements become too loose or too frequent, then  simply stop following these recommendations unless you start to feel constipated.     FOLLOW-UP VISIT & QUESTIONS/CONCERNS  Call Dr. Ring office at [phone] and schedule a follow-up appointment for about two weeks after your surgery date.  However, if you are doing fine and don´t have any concerns then simply cancel the follow-up appointment.  Should you have any questions or concerns, please call Dr. Savage´s office.

## 2024-07-26 ENCOUNTER — TELEPHONE (OUTPATIENT)
Dept: SURGERY | Facility: CLINIC | Age: 79
End: 2024-07-26

## 2024-07-26 NOTE — TELEPHONE ENCOUNTER
Caller: Marnie Parra    Relationship to patient: Self    Best call back number: [phone] (home)       Patient is needing: PT CALLED TO ADV THAT SHE IS DOING FINE AND DOESN'T FEEL LIKE A F/U IS NECESSARY. SAID SHE WAS TOLD TO CALL TO INFORM IF SHE WAS DOING OK AFTER PORT PLACEMENT

## 2024-08-17 ENCOUNTER — HOSPITAL ENCOUNTER (EMERGENCY)
Facility: HOSPITAL | Age: 79
Discharge: HOME | End: 2024-08-17
Attending: EMERGENCY MEDICINE
Payer: MEDICARE

## 2024-08-17 ENCOUNTER — APPOINTMENT (OUTPATIENT)
Dept: CT IMAGING | Facility: HOSPITAL | Age: 79
End: 2024-08-17
Payer: MEDICARE

## 2024-08-17 VITALS
WEIGHT: 130.51 LBS | OXYGEN SATURATION: 98 % | HEART RATE: 84 BPM | RESPIRATION RATE: 18 BRPM | BODY MASS INDEX: 20.98 KG/M2 | HEIGHT: 66 IN | TEMPERATURE: 98.9 F | DIASTOLIC BLOOD PRESSURE: 65 MMHG | SYSTOLIC BLOOD PRESSURE: 137 MMHG

## 2024-08-17 DIAGNOSIS — N39.0 ACUTE UTI: Primary | ICD-10-CM

## 2024-08-17 LAB
ALBUMIN SERPL-MCNC: 3.2 G/DL (ref 3.5–5.2)
ALBUMIN/GLOB SERPL: 1.3 G/DL
ALP SERPL-CCNC: 89 U/L (ref 39–117)
ALT SERPL W P-5'-P-CCNC: 11 U/L (ref 1–33)
AMORPH URATE CRY URNS QL MICRO: ABNORMAL /HPF
ANION GAP SERPL CALCULATED.3IONS-SCNC: 9.9 MMOL/L (ref 5–15)
AST SERPL-CCNC: 13 U/L (ref 1–32)
BACTERIA UR QL AUTO: ABNORMAL /HPF
BASOPHILS # BLD AUTO: 0.01 10*3/MM3 (ref 0–0.2)
BASOPHILS NFR BLD AUTO: 0.3 % (ref 0–1.5)
BILIRUB SERPL-MCNC: 0.2 MG/DL (ref 0–1.2)
BILIRUB UR QL STRIP: NEGATIVE
BUN SERPL-MCNC: 11 MG/DL (ref 8–23)
BUN/CREAT SERPL: 15.3 (ref 7–25)
CALCIUM SPEC-SCNC: 8.1 MG/DL (ref 8.6–10.5)
CHLORIDE SERPL-SCNC: 106 MMOL/L (ref 98–107)
CLARITY UR: ABNORMAL
CO2 SERPL-SCNC: 22.1 MMOL/L (ref 22–29)
COLOR UR: YELLOW
CREAT SERPL-MCNC: 0.72 MG/DL (ref 0.57–1)
D-LACTATE SERPL-SCNC: 0.8 MMOL/L (ref 0.5–2)
DEPRECATED RDW RBC AUTO: 43.2 FL (ref 37–54)
EGFRCR SERPLBLD CKD-EPI 2021: 85.7 ML/MIN/1.73
EOSINOPHIL # BLD AUTO: 0.01 10*3/MM3 (ref 0–0.4)
EOSINOPHIL NFR BLD AUTO: 0.3 % (ref 0.3–6.2)
ERYTHROCYTE [DISTWIDTH] IN BLOOD BY AUTOMATED COUNT: 13.2 % (ref 12.3–15.4)
GLOBULIN UR ELPH-MCNC: 2.5 GM/DL
GLUCOSE SERPL-MCNC: 104 MG/DL (ref 65–99)
GLUCOSE UR STRIP-MCNC: NEGATIVE MG/DL
GRAN CASTS URNS QL MICRO: ABNORMAL /LPF
HCT VFR BLD AUTO: 32.5 % (ref 34–46.6)
HGB BLD-MCNC: 10.6 G/DL (ref 12–15.9)
HGB UR QL STRIP.AUTO: ABNORMAL
HOLD SPECIMEN: NORMAL
HOLD SPECIMEN: NORMAL
HYALINE CASTS UR QL AUTO: ABNORMAL /LPF
IMM GRANULOCYTES # BLD AUTO: 0.01 10*3/MM3 (ref 0–0.05)
IMM GRANULOCYTES NFR BLD AUTO: 0.3 % (ref 0–0.5)
KETONES UR QL STRIP: ABNORMAL
LEUKOCYTE ESTERASE UR QL STRIP.AUTO: ABNORMAL
LIPASE SERPL-CCNC: 15 U/L (ref 13–60)
LYMPHOCYTES # BLD AUTO: 0.25 10*3/MM3 (ref 0.7–3.1)
LYMPHOCYTES NFR BLD AUTO: 8.5 % (ref 19.6–45.3)
MCH RBC QN AUTO: 29 PG (ref 26.6–33)
MCHC RBC AUTO-ENTMCNC: 32.6 G/DL (ref 31.5–35.7)
MCV RBC AUTO: 88.8 FL (ref 79–97)
MONOCYTES # BLD AUTO: 0.15 10*3/MM3 (ref 0.1–0.9)
MONOCYTES NFR BLD AUTO: 5.1 % (ref 5–12)
MUCOUS THREADS URNS QL MICRO: ABNORMAL /HPF
NEUTROPHILS NFR BLD AUTO: 2.51 10*3/MM3 (ref 1.7–7)
NEUTROPHILS NFR BLD AUTO: 85.5 % (ref 42.7–76)
NITRITE UR QL STRIP: POSITIVE
NRBC BLD AUTO-RTO: 0 /100 WBC (ref 0–0.2)
PH UR STRIP.AUTO: 7 [PH] (ref 5–8)
PLATELET # BLD AUTO: 120 10*3/MM3 (ref 140–450)
PMV BLD AUTO: 9.9 FL (ref 6–12)
POTASSIUM SERPL-SCNC: 3.1 MMOL/L (ref 3.5–5.2)
PROT SERPL-MCNC: 5.7 G/DL (ref 6–8.5)
PROT UR QL STRIP: ABNORMAL
RBC # BLD AUTO: 3.66 10*6/MM3 (ref 3.77–5.28)
RBC # UR STRIP: ABNORMAL /HPF
REF LAB TEST METHOD: ABNORMAL
SODIUM SERPL-SCNC: 138 MMOL/L (ref 136–145)
SP GR UR STRIP: 1.02 (ref 1–1.03)
SQUAMOUS #/AREA URNS HPF: ABNORMAL /HPF
UROBILINOGEN UR QL STRIP: ABNORMAL
WBC # UR STRIP: ABNORMAL /HPF
WBC NRBC COR # BLD AUTO: 2.94 10*3/MM3 (ref 3.4–10.8)
WHOLE BLOOD HOLD COAG: NORMAL
WHOLE BLOOD HOLD SPECIMEN: NORMAL

## 2024-08-17 PROCEDURE — 25810000003 SODIUM CHLORIDE 0.9 % SOLUTION: Performed by: EMERGENCY MEDICINE

## 2024-08-17 PROCEDURE — 25010000002 ONDANSETRON PER 1 MG: Performed by: EMERGENCY MEDICINE

## 2024-08-17 PROCEDURE — 83690 ASSAY OF LIPASE: CPT | Performed by: EMERGENCY MEDICINE

## 2024-08-17 PROCEDURE — 25010000002 HEPARIN LOCK FLUSH PER 10 UNITS: Performed by: EMERGENCY MEDICINE

## 2024-08-17 PROCEDURE — 85025 COMPLETE CBC W/AUTO DIFF WBC: CPT

## 2024-08-17 PROCEDURE — 25510000001 IOPAMIDOL PER 1 ML: Performed by: EMERGENCY MEDICINE

## 2024-08-17 PROCEDURE — 87086 URINE CULTURE/COLONY COUNT: CPT | Performed by: EMERGENCY MEDICINE

## 2024-08-17 PROCEDURE — 96375 TX/PRO/DX INJ NEW DRUG ADDON: CPT

## 2024-08-17 PROCEDURE — 87186 SC STD MICRODIL/AGAR DIL: CPT | Performed by: EMERGENCY MEDICINE

## 2024-08-17 PROCEDURE — 25010000002 ACETAMINOPHEN 10 MG/ML SOLUTION: Performed by: EMERGENCY MEDICINE

## 2024-08-17 PROCEDURE — 80053 COMPREHEN METABOLIC PANEL: CPT | Performed by: EMERGENCY MEDICINE

## 2024-08-17 PROCEDURE — 81001 URINALYSIS AUTO W/SCOPE: CPT | Performed by: EMERGENCY MEDICINE

## 2024-08-17 PROCEDURE — 99285 EMERGENCY DEPT VISIT HI MDM: CPT

## 2024-08-17 PROCEDURE — 96365 THER/PROPH/DIAG IV INF INIT: CPT

## 2024-08-17 PROCEDURE — 87077 CULTURE AEROBIC IDENTIFY: CPT | Performed by: EMERGENCY MEDICINE

## 2024-08-17 PROCEDURE — 83605 ASSAY OF LACTIC ACID: CPT

## 2024-08-17 PROCEDURE — 74177 CT ABD & PELVIS W/CONTRAST: CPT

## 2024-08-17 PROCEDURE — 25010000002 CEFTRIAXONE PER 250 MG: Performed by: EMERGENCY MEDICINE

## 2024-08-17 RX ORDER — SODIUM CHLORIDE 0.9 % (FLUSH) 0.9 %
10 SYRINGE (ML) INJECTION EVERY 12 HOURS SCHEDULED
Status: DISCONTINUED | OUTPATIENT
Start: 2024-08-17 | End: 2024-08-17 | Stop reason: HOSPADM

## 2024-08-17 RX ORDER — CEPHALEXIN 500 MG/1
500 CAPSULE ORAL 3 TIMES DAILY
Qty: 21 CAPSULE | Refills: 0 | Status: SHIPPED | OUTPATIENT
Start: 2024-08-17 | End: 2024-08-24

## 2024-08-17 RX ORDER — SODIUM CHLORIDE 0.9 % (FLUSH) 0.9 %
10 SYRINGE (ML) INJECTION AS NEEDED
Status: DISCONTINUED | OUTPATIENT
Start: 2024-08-17 | End: 2024-08-17 | Stop reason: HOSPADM

## 2024-08-17 RX ORDER — HEPARIN SODIUM (PORCINE) LOCK FLUSH IV SOLN 100 UNIT/ML 100 UNIT/ML
5 SOLUTION INTRAVENOUS AS NEEDED
Status: DISCONTINUED | OUTPATIENT
Start: 2024-08-17 | End: 2024-08-17 | Stop reason: HOSPADM

## 2024-08-17 RX ORDER — SODIUM CHLORIDE 0.9 % (FLUSH) 0.9 %
20 SYRINGE (ML) INJECTION AS NEEDED
Status: DISCONTINUED | OUTPATIENT
Start: 2024-08-17 | End: 2024-08-17 | Stop reason: HOSPADM

## 2024-08-17 RX ORDER — SODIUM CHLORIDE 9 MG/ML
40 INJECTION, SOLUTION INTRAVENOUS AS NEEDED
Status: DISCONTINUED | OUTPATIENT
Start: 2024-08-17 | End: 2024-08-17 | Stop reason: HOSPADM

## 2024-08-17 RX ORDER — ACETAMINOPHEN 10 MG/ML
1000 INJECTION, SOLUTION INTRAVENOUS ONCE
Status: COMPLETED | OUTPATIENT
Start: 2024-08-17 | End: 2024-08-17

## 2024-08-17 RX ORDER — ONDANSETRON 4 MG/1
4 TABLET, ORALLY DISINTEGRATING ORAL EVERY 6 HOURS PRN
Qty: 15 TABLET | Refills: 0 | Status: SHIPPED | OUTPATIENT
Start: 2024-08-17

## 2024-08-17 RX ORDER — ONDANSETRON 2 MG/ML
4 INJECTION INTRAMUSCULAR; INTRAVENOUS ONCE
Status: COMPLETED | OUTPATIENT
Start: 2024-08-17 | End: 2024-08-17

## 2024-08-17 RX ADMIN — CEFTRIAXONE SODIUM 1000 MG: 1 INJECTION, POWDER, FOR SOLUTION INTRAMUSCULAR; INTRAVENOUS at 20:09

## 2024-08-17 RX ADMIN — SODIUM CHLORIDE 1000 ML: 9 INJECTION, SOLUTION INTRAVENOUS at 17:30

## 2024-08-17 RX ADMIN — IOPAMIDOL 100 ML: 755 INJECTION, SOLUTION INTRAVENOUS at 18:51

## 2024-08-17 RX ADMIN — HEPARIN 500 UNITS: 100 SYRINGE at 21:48

## 2024-08-17 RX ADMIN — ONDANSETRON 4 MG: 2 INJECTION INTRAMUSCULAR; INTRAVENOUS at 17:42

## 2024-08-17 RX ADMIN — ACETAMINOPHEN 1000 MG: 10 INJECTION INTRAVENOUS at 19:43

## 2024-08-17 NOTE — ED PROVIDER NOTES
"Time: 4:57 PM EDT  Date of encounter:  8/17/2024  Independent Historian/Clinical History and Information was obtained by:   Patient    History is limited by: N/A    Chief Complaint: Nausea, vomiting and diarrhea      History of Present Illness:  Patient is a 79 y.o. year old female who presents to the emergency department for evaluation of nausea, vomiting and diarrhea.  Abdominal pain.  Patient is only having approximately 2 bowel movements per day.  Denies any GI bleeding.  Afebrile.  States it feels like her abdomen is diffusely \"on fire\".  This has been an issue for the past 1 week.      Patient Care Team  Primary Care Provider: Nicolas Velasquez MD    Past Medical History:     Allergies   Allergen Reactions    Levofloxacin Other (See Comments)     TORE ROTATOR CUFF  Other reaction(s): Rotator cuff tear arthropathy      Propofol GI Intolerance    Dilaudid [Hydromorphone] Other (See Comments), Mental Status Change and Hallucinations     Dilaudid causes the patient to hallucinate.    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 side     Past Surgical History:   Procedure Laterality Date    ABDOMINAL SURGERY      APPENDECTOMY      COLONOSCOPY      CYSTECTOMY      bladder removal, with pouch made from stomach    HERNIA REPAIR      HYSTERECTOMY      KIDNEY STONE SURGERY      SIGMOIDOSCOPY N/A 7/3/2024    Procedure: FLEXIBLE SIGMOIDOSCOPY TO TRANSVERSE COLON WITH BIOPSIES;  Surgeon: Darius Andujar MD;  Location: [facility] ENDOSCOPY;  Service: Gastroenterology;  Laterality: N/A;  RECTAL MASS, DIVERTICULOSIS    TUBAL ABDOMINAL LIGATION      " VENOUS ACCESS DEVICE (PORT) INSERTION Right 7/23/2024    Procedure: INSERTION VENOUS ACCESS DEVICE;  Surgeon: Luis Savage MD;  Location: [facility] MAIN OR;  Service: General;  Laterality: Right;     History reviewed. No pertinent family history.    Home Medications:  Prior to Admission medications    Medication Sig Start Date End Date Taking? Authorizing Provider   acetaminophen (TYLENOL) 500 MG tablet Take 2 tablets by mouth Every 4 (Four) to 6 (Six) Hours As Needed for Mild Pain (Max 4000 mg daily).    Pavel Sanon MD   amiodarone (PACERONE) 200 MG tablet Take 1 tablet by mouth Daily.    Pavel Sanon MD   metoprolol succinate XL (TOPROL-XL) 50 MG 24 hr tablet Take 1 tablet by mouth Daily.    Pavel Sanon MD   multivitamin with minerals tablet tablet Take 1 tablet by mouth Daily.    Pavel Sanon MD   pantoprazole (PROTONIX) 40 MG EC tablet Take 1 tablet by mouth As Needed. 9/29/21   Pavel Sanon MD   traMADol (Ultram) 50 MG tablet Take 1 tablet by mouth Every 6 (Six) Hours As Needed for Moderate Pain or Severe Pain. 7/23/24 7/23/25  Luis Savage MD        Social History:   Social History     Tobacco Use    Smoking status: Every Day     Current packs/day: 1.00     Average packs/day: 1 pack/day for 58.6 years (58.6 ttl pk-yrs)     Types: Cigarettes     Start date: 1/5/1966    Smokeless tobacco: Never    Tobacco comments:     Last 7/23/24   Vaping Use    Vaping status: Never Used   Substance Use Topics    Alcohol use: Never    Drug use: Never         Review of Systems:  Review of Systems   Constitutional:  Negative for chills and fever.   HENT:  Negative for congestion, rhinorrhea and sore throat.    Eyes:  Negative for photophobia.   Respiratory:  Negative for apnea, cough, chest tightness and shortness of breath.    Cardiovascular:  Negative for chest pain and palpitations.   Gastrointestinal:  Positive for abdominal pain, diarrhea, nausea and vomiting.   Endocrine:  "Negative.    Genitourinary:  Negative for difficulty urinating and dysuria.   Musculoskeletal:  Negative for back pain, joint swelling and myalgias.   Skin:  Negative for color change and wound.   Allergic/Immunologic: Negative.    Neurological:  Negative for seizures and headaches.   Psychiatric/Behavioral: Negative.     All other systems reviewed and are negative.       Physical Exam:  /64   Pulse 80   Temp 98.9 °F (37.2 °C) (Oral)   Resp 18   Ht 167.6 cm (66\")   Wt 59.2 kg (130 lb 8.2 oz)   SpO2 98%   BMI 21.07 kg/m²     Physical Exam  Vitals and nursing note reviewed.   Constitutional:       General: She is awake.      Appearance: Normal appearance.   HENT:      Head: Normocephalic and atraumatic.      Nose: Nose normal.      Mouth/Throat:      Mouth: Mucous membranes are moist.   Eyes:      Extraocular Movements: Extraocular movements intact.      Pupils: Pupils are equal, round, and reactive to light.   Cardiovascular:      Rate and Rhythm: Normal rate and regular rhythm.      Heart sounds: Normal heart sounds.   Pulmonary:      Effort: Pulmonary effort is normal. No respiratory distress.      Breath sounds: Normal breath sounds. No wheezing, rhonchi or rales.   Abdominal:      General: Bowel sounds are normal.      Palpations: Abdomen is soft.      Tenderness: There is no abdominal tenderness. There is no guarding or rebound.      Comments: No rigidity   Musculoskeletal:         General: No tenderness. Normal range of motion.      Cervical back: Normal range of motion and neck supple.   Skin:     General: Skin is warm and dry.      Coloration: Skin is not jaundiced.   Neurological:      General: No focal deficit present.      Mental Status: She is alert and oriented to person, place, and time. Mental status is at baseline.   Psychiatric:         Mood and Affect: Mood normal.                  Procedures:  Procedures      Medical Decision Making:      Comorbidities that affect care:    GERD, HTN, " CVA, rectal cancer    External Notes reviewed:    Previous Clinic Note: Surgical oncology office visit 7/15/2024.  Description: Rectal cancer.  Assessment and plan as follows:  ASSESSMENT    Newly diagnosed rectal cancer, based on endoscopic reports. We will obtain the pathology from [facility] as soon as possible simple for confirmation.    She is scheduled for appropriate staging with a CT PET and MRI of the rectum which I think will be critical for us to decide on preoperative neoadjuvant therapy.    Given her extensive bladder surgery, and most recent abdominal surgery, my initial gestalt may be if the MRI of the rectum demonstrates a more earlier stage that neoadjuvant systemic chemotherapy would be most appropriate.    Patient has already had extensive pelvic operations, as well as extensive abdominal operation that I do worry about the side effects of potential radiation therapy.    Regardless we will make the decision based on her completion of preoperative staging and then proceed in that direction.    PLAN  See orders. The plan was discussed with the patient and/or family.    The patient was interviewed and examined at the bedside and the chart reviewed. The previous observations, recommendations, and conclusions were reviewed. Thank you so much for asking me to see this interesting patient.    Hakan Smyth II, MD PhD - Division of Surgical Oncology  7/15/2024 11:34 AM  Electronically signed by Hakan Smyth MD at 07/15/2024 2:08 PM EDT     The following orders were placed and all results were independently analyzed by me:  Orders Placed This Encounter   Procedures    Urine Culture - Urine, Urine, Clean Catch    CT Abdomen Pelvis With Contrast    [place] Draw    Comprehensive Metabolic Panel    Lipase    Urinalysis With Microscopic If Indicated (No Culture) - Urine, Clean Catch    Lactic Acid, Plasma    CBC Auto Differential    Urinalysis, Microscopic Only - Urine, Clean Catch    NPO Diet NPO  Type: Strict NPO    Undress & Gown    Connectors / Hubs Must Be Scrubbed 15 Seconds Using 70% Alcohol Before Access - Allow to Dry Before Accessing Line    Change Dressing to IV Site As Needed When Damp, Loose or Soiled    Change Needleless Connectors    Change Walker Needle & Transparent Dressing Every 7 Days    Change Walker Needle As Needed    Daily CHG Bath While Central Line in Place    Insert Peripheral IV    CBC & Differential    Green Top (Gel)    Lavender Top    Gold Top - SST    Light Blue Top       Medications Given in the Emergency Department:  Medications   sodium chloride 0.9 % flush 10 mL (has no administration in time range)   sodium chloride 0.9 % flush 10 mL (has no administration in time range)   sodium chloride 0.9 % flush 10 mL (has no administration in time range)   sodium chloride 0.9 % flush 20 mL (has no administration in time range)   sodium chloride 0.9 % infusion 40 mL (has no administration in time range)   heparin injection 500 Units (has no administration in time range)   sodium chloride 0.9 % bolus 1,000 mL (0 mL Intravenous Stopped 8/17/24 1947)   ondansetron (ZOFRAN) injection 4 mg (4 mg Intravenous Given 8/17/24 1742)   iopamidol (ISOVUE-370) 76 % injection 100 mL (100 mL Intravenous Given 8/17/24 1851)   acetaminophen (OFIRMEV) injection 1,000 mg (1,000 mg Intravenous Given 8/17/24 1943)   cefTRIAXone (ROCEPHIN) 1,000 mg in sodium chloride 0.9 % 100 mL IVPB-VTB (1,000 mg Intravenous New Bag 8/17/24 2009)        ED Course:         Labs:    Lab Results (last 24 hours)       Procedure Component Value Units Date/Time    CBC & Differential [[id number]]  (Abnormal) Collected: 08/17/24 1630    Specimen: Blood Updated: 08/17/24 1637    Narrative:      The following orders were created for panel order CBC & Differential.  Procedure                               Abnormality         Status                     ---------                               -----------         ------                      CBC Auto Differential[[id number]]        Abnormal            Final result                 Please view results for these tests on the individual orders.    Comprehensive Metabolic Panel [[id number]]  (Abnormal) Collected: 08/17/24 1630    Specimen: Blood Updated: 08/17/24 1656     Glucose 104 mg/dL      BUN 11 mg/dL      Creatinine 0.72 mg/dL      Sodium 138 mmol/L      Potassium 3.1 mmol/L      Chloride 106 mmol/L      CO2 22.1 mmol/L      Calcium 8.1 mg/dL      Total Protein 5.7 g/dL      Albumin 3.2 g/dL      ALT (SGPT) 11 U/L      AST (SGOT) 13 U/L      Alkaline Phosphatase 89 U/L      Total Bilirubin 0.2 mg/dL      Globulin 2.5 gm/dL      A/G Ratio 1.3 g/dL      BUN/Creatinine Ratio 15.3     Anion Gap 9.9 mmol/L      eGFR 85.7 mL/min/1.73     Narrative:      GFR Normal >60  Chronic Kidney Disease <60  Kidney Failure <15    The GFR formula is only valid for adults with stable renal function between ages 18 and 70.    Lipase [[id number]]  (Normal) Collected: 08/17/24 1630    Specimen: Blood Updated: 08/17/24 1656     Lipase 15 U/L     Lactic Acid, Plasma [[id number]]  (Normal) Collected: 08/17/24 1630    Specimen: Blood Updated: 08/17/24 1653     Lactate 0.8 mmol/L     CBC Auto Differential [[id number]]  (Abnormal) Collected: 08/17/24 1630    Specimen: Blood Updated: 08/17/24 1637     WBC 2.94 10*3/mm3      RBC 3.66 10*6/mm3      Hemoglobin 10.6 g/dL      Hematocrit 32.5 %      MCV 88.8 fL      MCH 29.0 pg      MCHC 32.6 g/dL      RDW 13.2 %      RDW-SD 43.2 fl      MPV 9.9 fL      Platelets 120 10*3/mm3      Neutrophil % 85.5 %      Lymphocyte % 8.5 %      Monocyte % 5.1 %      Eosinophil % 0.3 %      Basophil % 0.3 %      Immature Grans % 0.3 %      Neutrophils, Absolute 2.51 10*3/mm3      Lymphocytes, Absolute 0.25 10*3/mm3      Monocytes, Absolute 0.15 10*3/mm3      Eosinophils, Absolute 0.01 10*3/mm3      Basophils, Absolute 0.01 10*3/mm3      Immature Grans, Absolute 0.01 10*3/mm3      nRBC 0.0 /100 WBC      Urinalysis With Microscopic If Indicated (No Culture) - Urine, Clean Catch [[id number]]  (Abnormal) Collected: 08/17/24 1940    Specimen: Urine, Clean Catch Updated: 08/17/24 1956     Color, UA Yellow     Appearance, UA Cloudy     pH, UA 7.0     Specific Gravity, UA 1.020     Glucose, UA Negative     Ketones, UA Trace     Bilirubin, UA Negative     Blood, UA Small (1+)     Protein, UA 30 mg/dL (1+)     Leuk Esterase, UA Trace     Nitrite, UA Positive     Urobilinogen, UA 0.2 E.U./dL    Urinalysis, Microscopic Only - Urine, Clean Catch [[id number]]  (Abnormal) Collected: 08/17/24 1940    Specimen: Urine, Clean Catch Updated: 08/17/24 2044     RBC, UA 6-10 /HPF      WBC, UA 21-50 /HPF      Bacteria, UA 4+ /HPF      Squamous Epithelial Cells, UA 3-6 /HPF      Hyaline Casts, UA None Seen /LPF      Granular Casts, UA 3-6 /LPF      Amorphous Crystals, UA Small/1+ /HPF      Mucus, UA Small/1+ /HPF      Methodology Manual Light Microscopy    Urine Culture - Urine, Urine, Clean Catch [[id number]] Collected: 08/17/24 1940    Specimen: Urine, Clean Catch Updated: 08/17/24 2012             Imaging:    CT Abdomen Pelvis With Contrast    Result Date: 8/17/2024  CT ABDOMEN PELVIS W CONTRAST Date of Exam: 8/17/2024 6:50 PM EDT Indication: Rectal cancer, abdominal pain, vomiting abdominal pain. Comparison: CT abdomen/pelvis 7/3/2024. MR pelvis 7/16/2024. PET/CT 7/16/2024. Technique: Axial CT images were obtained of the abdomen and pelvis after the uneventful intravenous administration of iodinated contrast. Reconstructed coronal and sagittal images were also obtained. Automated exposure control and iterative construction methods were used. Findings: Lung Bases: Bibasal atelectasis. Liver: No significant abnormality.  Gallbladder and biliary tree: No significant abnormality.  Spleen: Scattered calcifications likely sequela of prior granulomatous disease.  Pancreas: No significant abnormality.  Adrenal glands: Similar thickening of  bilateral adrenal glands without discrete nodule.  Kidneys and ureters: Patulous/prominent appearing bilateral renal collecting systems in setting of prior cystectomy with ileal conduit. No perinephric fat stranding/inflammation to suggest obstruction.  Stomach and duodenum: Small hiatal hernia. Small and large bowel: Asymmetric rectal wall thickening in setting of known rectal malignancy is redemonstrated. Colonic diverticulosis. No evidence of obstruction. Postsurgical changes of right lower quadrant ileal conduit/neobladder. Peritoneal cavity: No free fluid or free air. Bladder: Surgically absent.  Pelvic organs: Prior hysterectomy.  Vasculature: Atherosclerotic calcifications.  Lymph nodes: No pathologic appearing lymph nodes by imaging criteria.  Bones and soft tissues: Degenerative changes of the imaged spine. No acute osseous abnormality. Anterior abdominal wall hernia containing fat and nonobstructed bowel.     Impression: No acute findings in the abdomen/pelvis Asymmetric rectal wall thickening in setting of known rectal malignancy, better assessed on prior pelvis MRI. No evidence of metastatic disease in the abdomen/pelvis. Electronically Signed: Ford Bar  8/17/2024 7:54 PM EDT  Workstation ID: [id number]       Differential Diagnosis and Discussion:    Abdominal Pain: Based on the patient's signs and symptoms, I considered abdominal aortic aneurysm, small bowel obstruction, pancreatitis, acute cholecystitis, acute appendecitis, peptic ulcer disease, gastritis, colitis, endocrine disorders, irritable bowel syndrome and other differential diagnosis an etiology of the patient's abdominal pain.    All labs were reviewed and interpreted by me.  CT scan radiology impression was interpreted by me.    MDM     Amount and/or Complexity of Data Reviewed  Clinical lab tests: reviewed                 Patient Care Considerations:    SEPSIS was considered but is NOT present in the emergency department as SIRS  criteria is not present.      Consultants/Shared Management Plan:    None    Social Determinants of Health:    Patient is independent, reliable, and has access to care.       Disposition and Care Coordination:    Discharged: I considered escalation of care by admitting this patient to the hospital, however patient feels comfortable and prefers to go home.  No signs of sepsis.  Benign abdominal exam with unremarkable CT abdomen pelvis.    I have explained the patient´s condition, diagnoses and treatment plan based on the information available to me at this time. I have answered questions and addressed any concerns. The patient has a good  understanding of the patient´s diagnosis, condition, and treatment plan as can be expected at this point. The vital signs have been stable. The patient´s condition is stable and appropriate for discharge from the emergency department.      The patient will pursue further outpatient evaluation with the primary care physician or other designated or consulting physician as outlined in the discharge instructions. They are agreeable to this plan of care and follow-up instructions have been explained in detail. The patient has received these instructions in written format and has expressed an understanding of the discharge instructions. The patient is aware that any significant change in condition or worsening of symptoms should prompt an immediate return to this or the closest emergency department or call to 911.    Final diagnoses:   Acute UTI        ED Disposition       ED Disposition   Discharge    Condition   Stable    Comment   --               This medical record created using voice recognition software.             Fletcher Bower MD  08/17/24 [id number]

## 2024-08-18 NOTE — DISCHARGE INSTRUCTIONS
Stay well-hydrated with very frequent sips of ideally only water and sports drinks/electrolyte replacement drinks.  Your potassium today is mildly low so the sports drinks would be helpful for this reason.  Return the emergency department immediately for fever, uncontrolled vomiting or uncontrolled pain.

## 2024-08-20 LAB — BACTERIA SPEC AEROBE CULT: ABNORMAL

## 2024-10-01 ENCOUNTER — TELEPHONE (OUTPATIENT)
Dept: GASTROENTEROLOGY | Facility: CLINIC | Age: 79
End: 2024-10-01
Payer: MEDICARE

## 2024-10-01 ENCOUNTER — PREP FOR SURGERY (OUTPATIENT)
Dept: OTHER | Facility: HOSPITAL | Age: 79
End: 2024-10-01
Payer: MEDICARE

## 2024-10-01 ENCOUNTER — ANESTHESIA EVENT (OUTPATIENT)
Dept: GASTROENTEROLOGY | Facility: HOSPITAL | Age: 79
End: 2024-10-01
Payer: MEDICARE

## 2024-10-01 DIAGNOSIS — Z85.048 HISTORY OF RECTAL CANCER: ICD-10-CM

## 2024-10-01 DIAGNOSIS — R13.10 DYSPHAGIA, UNSPECIFIED TYPE: Primary | ICD-10-CM

## 2024-10-01 NOTE — TELEPHONE ENCOUNTER
Per Dr. Wolf:  Patient needs EGD tomorrow, 10.02.24 - dysphagia, h/o rectal cancer, getting chemo.     Patient is scheduled for EGD with Dr. Wolf on Wednesday, 10.02.24 with expected arrival time of 0700.    Spoke with patient and daughter (patient requested JON to be updated) and informed of scheduled EGD.  Advised of NPO after midnight and need for a  post-procedure.  Daughter states patient doesn't take many medications in the morning.  She states that patient no longer takes blood pressure medications.      Patient reports seeing Dr. Andino every 6 months, denies any chest pain/shortness of breath/heart issues in the last year.    Alerted PAT of add on to schedule and that I've already spoken with patient.

## 2024-10-01 NOTE — TELEPHONE ENCOUNTER
Requested cardiac clearance from Dr. Andino's office.  Confirmed fax number, verified successful transmission, and alerted staff of incoming urgent clearance needed for procedure date of 10.02.24 at 0700.

## 2024-10-01 NOTE — ANESTHESIA PREPROCEDURE EVALUATION
Anesthesia Evaluation     Nursing notes reviewed   history of anesthetic complications:  PONV  NPO Solid Status: > 8 hours  NPO Liquid Status: > 4 hours           Airway   Mallampati: I  TM distance: >3 FB  Neck ROM: limited  No difficulty expected  Comment: Slight limited neck ROM d/t arthritic pain   Dental - normal exam     Pulmonary - normal exam    breath sounds clear to auscultation  (+) a smoker (former) Current, cigarettes,  Cardiovascular - normal exam  Exercise tolerance: good (4-7 METS)    Patient on routine beta blocker and Beta blocker given within 24 hours of surgery  Rhythm: regular  Rate: normal    (+) hypertension (metoprolol, amiodarone) well controlled 2 medications or greater, dysrhythmias (history of two episodes of afib after surgery, both episodes occuring on post op day 2), hyperlipidemia    ROS comment: Echo (12/9/23):  Normal left ventricle intracavitary chamber size.  Calculated left ventricular ejection fraction of 80 % (Biplane Duran's  method).  Hyperdynamic left ventricular systolic function.  LV diastolic dysfunction ( Impaired relaxation ).  The right ventricle is normal in size and function.          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   Calculated left ventricular ejection fraction of 80 % (Biplane Duran's   method).   Hyperdynamic left ventricular systolic function.   LV diastolic dysfunction ( Impaired relaxation ).   The right ventricle is normal in size and function.           Phys Exam Other: Zofran 4 mg IV in preop for nausea     Port in place                Anesthesia Plan    ASA 3     general   total IV anesthesia  (Total IV Anesthesia    Patient understands anesthesia not responsible for dental damage.  )  intravenous induction     Anesthetic plan, risks, benefits, and alternatives have been provided, discussed and informed consent has been obtained with: patient and child.  Pre-procedure education provided  Plan discussed with CRNA.      CODE STATUS:

## 2024-10-02 ENCOUNTER — ANESTHESIA (OUTPATIENT)
Dept: GASTROENTEROLOGY | Facility: HOSPITAL | Age: 79
End: 2024-10-02
Payer: MEDICARE

## 2024-10-02 ENCOUNTER — HOSPITAL ENCOUNTER (OUTPATIENT)
Facility: HOSPITAL | Age: 79
Setting detail: HOSPITAL OUTPATIENT SURGERY
Discharge: HOME | End: 2024-10-02
Attending: INTERNAL MEDICINE | Admitting: INTERNAL MEDICINE
Payer: MEDICARE

## 2024-10-02 VITALS
OXYGEN SATURATION: 100 % | RESPIRATION RATE: 17 BRPM | HEART RATE: 68 BPM | TEMPERATURE: 97 F | DIASTOLIC BLOOD PRESSURE: 55 MMHG | BODY MASS INDEX: 20.21 KG/M2 | SYSTOLIC BLOOD PRESSURE: 141 MMHG | WEIGHT: 125.22 LBS

## 2024-10-02 DIAGNOSIS — Z85.048 HISTORY OF RECTAL CANCER: ICD-10-CM

## 2024-10-02 DIAGNOSIS — R13.10 DYSPHAGIA, UNSPECIFIED TYPE: ICD-10-CM

## 2024-10-02 PROCEDURE — 43249 ESOPH EGD DILATION <30 MM: CPT | Performed by: INTERNAL MEDICINE

## 2024-10-02 PROCEDURE — 25010000002 PROPOFOL 500 MG/50ML EMULSION

## 2024-10-02 PROCEDURE — 25810000003 LACTATED RINGERS PER 1000 ML

## 2024-10-02 PROCEDURE — 25010000002 ONDANSETRON PER 1 MG

## 2024-10-02 PROCEDURE — 43239 EGD BIOPSY SINGLE/MULTIPLE: CPT | Performed by: INTERNAL MEDICINE

## 2024-10-02 PROCEDURE — 25010000002 LIDOCAINE PF 2% 2 % SOLUTION

## 2024-10-02 PROCEDURE — C1726 CATH, BAL DIL, NON-VASCULAR: HCPCS | Performed by: INTERNAL MEDICINE

## 2024-10-02 PROCEDURE — 88312 SPECIAL STAINS GROUP 1: CPT | Performed by: INTERNAL MEDICINE

## 2024-10-02 PROCEDURE — 88305 TISSUE EXAM BY PATHOLOGIST: CPT | Performed by: INTERNAL MEDICINE

## 2024-10-02 PROCEDURE — 25010000002 HEPARIN LOCK FLUSH PER 10 UNITS

## 2024-10-02 PROCEDURE — 25010000002 PROPOFOL 10 MG/ML EMULSION

## 2024-10-02 RX ORDER — SODIUM CHLORIDE 0.9 % (FLUSH) 0.9 %
10 SYRINGE (ML) INJECTION AS NEEDED
Status: DISCONTINUED | OUTPATIENT
Start: 2024-10-02 | End: 2024-10-02 | Stop reason: HOSPADM

## 2024-10-02 RX ORDER — SODIUM CHLORIDE 0.9 % (FLUSH) 0.9 %
10 SYRINGE (ML) INJECTION EVERY 12 HOURS SCHEDULED
Status: DISCONTINUED | OUTPATIENT
Start: 2024-10-02 | End: 2024-10-02 | Stop reason: HOSPADM

## 2024-10-02 RX ORDER — PROPOFOL 10 MG/ML
VIAL (ML) INTRAVENOUS AS NEEDED
Status: DISCONTINUED | OUTPATIENT
Start: 2024-10-02 | End: 2024-10-02 | Stop reason: SURG

## 2024-10-02 RX ORDER — PANTOPRAZOLE SODIUM 40 MG/1
40 TABLET, DELAYED RELEASE ORAL DAILY
Qty: 30 TABLET | Refills: 5 | Status: SHIPPED | OUTPATIENT
Start: 2024-10-02

## 2024-10-02 RX ORDER — HEPARIN SODIUM (PORCINE) LOCK FLUSH IV SOLN 100 UNIT/ML 100 UNIT/ML
5 SOLUTION INTRAVENOUS AS NEEDED
Status: DISCONTINUED | OUTPATIENT
Start: 2024-10-02 | End: 2024-10-02 | Stop reason: HOSPADM

## 2024-10-02 RX ORDER — SODIUM CHLORIDE 0.9 % (FLUSH) 0.9 %
20 SYRINGE (ML) INJECTION AS NEEDED
Status: DISCONTINUED | OUTPATIENT
Start: 2024-10-02 | End: 2024-10-02 | Stop reason: HOSPADM

## 2024-10-02 RX ORDER — LIDOCAINE HYDROCHLORIDE 20 MG/ML
INJECTION, SOLUTION EPIDURAL; INFILTRATION; INTRACAUDAL; PERINEURAL AS NEEDED
Status: DISCONTINUED | OUTPATIENT
Start: 2024-10-02 | End: 2024-10-02 | Stop reason: SURG

## 2024-10-02 RX ORDER — SERTRALINE HYDROCHLORIDE 25 MG/1
25 TABLET, FILM COATED ORAL NIGHTLY
COMMUNITY

## 2024-10-02 RX ORDER — METOCLOPRAMIDE 5 MG/1
5 TABLET ORAL
COMMUNITY

## 2024-10-02 RX ORDER — SODIUM CHLORIDE 9 MG/ML
40 INJECTION, SOLUTION INTRAVENOUS AS NEEDED
Status: DISCONTINUED | OUTPATIENT
Start: 2024-10-02 | End: 2024-10-02 | Stop reason: HOSPADM

## 2024-10-02 RX ORDER — SODIUM CHLORIDE, SODIUM LACTATE, POTASSIUM CHLORIDE, CALCIUM CHLORIDE 600; 310; 30; 20 MG/100ML; MG/100ML; MG/100ML; MG/100ML
30 INJECTION, SOLUTION INTRAVENOUS CONTINUOUS
Status: DISCONTINUED | OUTPATIENT
Start: 2024-10-02 | End: 2024-10-02 | Stop reason: HOSPADM

## 2024-10-02 RX ORDER — PROPOFOL 10 MG/ML
INJECTION, EMULSION INTRAVENOUS CONTINUOUS PRN
Status: DISCONTINUED | OUTPATIENT
Start: 2024-10-02 | End: 2024-10-02 | Stop reason: SURG

## 2024-10-02 RX ORDER — ONDANSETRON 2 MG/ML
4 INJECTION INTRAMUSCULAR; INTRAVENOUS ONCE
Status: COMPLETED | OUTPATIENT
Start: 2024-10-02 | End: 2024-10-02

## 2024-10-02 RX ADMIN — Medication 10 ML: at 09:39

## 2024-10-02 RX ADMIN — LIDOCAINE HYDROCHLORIDE 40 MG: 20 INJECTION, SOLUTION EPIDURAL; INFILTRATION; INTRACAUDAL; PERINEURAL at 08:51

## 2024-10-02 RX ADMIN — ONDANSETRON 4 MG: 2 INJECTION INTRAMUSCULAR; INTRAVENOUS at 07:53

## 2024-10-02 RX ADMIN — PROPOFOL 60 MG: 10 INJECTION, EMULSION INTRAVENOUS at 08:51

## 2024-10-02 RX ADMIN — SODIUM CHLORIDE, POTASSIUM CHLORIDE, SODIUM LACTATE AND CALCIUM CHLORIDE 30 ML/HR: 600; 310; 30; 20 INJECTION, SOLUTION INTRAVENOUS at 07:53

## 2024-10-02 RX ADMIN — HEPARIN 500 UNITS: 100 SYRINGE at 09:38

## 2024-10-02 RX ADMIN — PROPOFOL 150 MCG/KG/MIN: 10 INJECTION, EMULSION INTRAVENOUS at 08:51

## 2024-10-02 NOTE — H&P
Pre Procedure History & Physical    Chief Complaint:   Painful swallowing    Subjective     HPI:   History of malignancy patient undergoing chemotherapy and developed severe nausea and persistent burning especially with swallowing.    Past Medical History:   Past Medical History:   Diagnosis Date    Arthritis     Cancer     bladder    GERD (gastroesophageal reflux disease)     Hyperlipidemia     Hypertension     Kidney stone     PONV (postoperative nausea and vomiting)     Stroke     2016 left side       Past Surgical History:  Past Surgical History:   Procedure Laterality Date    ABDOMINAL SURGERY      APPENDECTOMY      COLONOSCOPY      CYSTECTOMY      bladder removal, with pouch made from stomach    HERNIA REPAIR      HYSTERECTOMY      KIDNEY STONE SURGERY      SIGMOIDOSCOPY N/A 7/3/2024    Procedure: FLEXIBLE SIGMOIDOSCOPY TO TRANSVERSE COLON WITH BIOPSIES;  Surgeon: Darius Andujar MD;  Location: [facility] ENDOSCOPY;  Service: Gastroenterology;  Laterality: N/A;  RECTAL MASS, DIVERTICULOSIS    TUBAL ABDOMINAL LIGATION      VENOUS ACCESS DEVICE (PORT) INSERTION Right 7/23/2024    Procedure: INSERTION VENOUS ACCESS DEVICE;  Surgeon: Luis Savage MD;  Location: [facility] MAIN OR;  Service: General;  Laterality: Right;       Family History:  History reviewed. No pertinent family history.    Social History:   reports that she has been smoking cigarettes. She started smoking about 58 years ago. She has a 58.7 pack-year smoking history. She has never used smokeless tobacco. She reports that she does not drink alcohol and does not use drugs.    Medications:   Medications Prior to Admission   Medication Sig Dispense Refill Last Dose    acetaminophen (TYLENOL) 500 MG tablet Take 2 tablets by mouth Every 4 (Four) to 6 (Six) Hours As Needed for Mild Pain (Max 4000 mg daily).   10/1/2024    ondansetron ODT (ZOFRAN-ODT) 4 MG disintegrating tablet Place 1 tablet on the tongue Every 6 (Six) Hours As Needed for Vomiting. 15  tablet 0 Past Week    pantoprazole (PROTONIX) 40 MG EC tablet Take 1 tablet by mouth As Needed.   10/1/2024    traMADol (Ultram) 50 MG tablet Take 1 tablet by mouth Every 6 (Six) Hours As Needed for Moderate Pain or Severe Pain. 5 tablet 0 Past Week    metoclopramide (REGLAN) 5 MG tablet Take 1 tablet by mouth 4 (Four) Times a Day Before Meals & at Bedtime.       sertraline (ZOLOFT) 25 MG tablet Take 1 tablet by mouth Every Night.          Allergies:  Levofloxacin, Propofol, Dilaudid [hydromorphone], Gabapentin, Propranolol, Adhesive tape, and Celecoxib        Objective     Blood pressure 153/85, pulse 82, temperature 97.3 °F (36.3 °C), temperature source Temporal, resp. rate 18, weight 56.8 kg (125 lb 3.5 oz), SpO2 96%.    Physical Exam   Constitutional: Pt is oriented to person, place, and time and well-developed, well-nourished, and in no distress.   Mouth/Throat: Oropharynx is clear and moist.   Neck: Normal range of motion.   Cardiovascular: Normal rate, regular rhythm and normal heart sounds.    Pulmonary/Chest: Effort normal and breath sounds normal.   Abdominal: Soft. Nontender  Skin: Skin is warm and dry.   Psychiatric: Mood, memory, affect and judgment normal.     Assessment & Plan     Diagnosis:  Painful swallowing    Anticipated Surgical Procedure:  EGD    The risks, benefits, and alternatives of this procedure have been discussed with the patient or the responsible party- the patient understands and agrees to proceed.

## 2024-10-02 NOTE — ANESTHESIA POSTPROCEDURE EVALUATION
Patient: Marnie Parra    Procedure Summary       Date: 10/02/24 Room / Location: [facility] ENDOSCOPY 4 / [facility] ENDOSCOPY    Anesthesia Start: 0848 Anesthesia Stop: 0906    Procedure: ESOPHAGOGASTRODUODENOSCOPY WITH BIOPSIES AND 15-18MM BALLOON DILATATION Diagnosis:       Dysphagia, unspecified type      History of rectal cancer      (Dysphagia, unspecified type [R13.10])      (History of rectal cancer [Z85.048])    Surgeons: Gilberto Wolf MD Provider: Russ Guo CRNA    Anesthesia Type: general ASA Status: 3            Anesthesia Type: general    Vitals  Vitals Value Taken Time   /55 10/02/24 0925   Temp 36.1 °C (97 °F) 10/02/24 0925   Pulse 68 10/02/24 0925   Resp 17 10/02/24 0925   SpO2 100 % 10/02/24 0925           Post Anesthesia Care and Evaluation    Post-procedure mental status: acceptable.  Pain management: satisfactory to patient    Airway patency: patent  Anesthetic complications: No anesthetic complications    Cardiovascular status: acceptable  Respiratory status: acceptable    Comments: Per chart review

## 2024-10-04 LAB
CYTO UR: NORMAL
LAB AP CASE REPORT: NORMAL
LAB AP CLINICAL INFORMATION: NORMAL
LAB AP SPECIAL STAINS: NORMAL
PATH REPORT.FINAL DX SPEC: NORMAL
PATH REPORT.GROSS SPEC: NORMAL

## 2024-10-17 ENCOUNTER — TELEPHONE (OUTPATIENT)
Dept: GASTROENTEROLOGY | Facility: CLINIC | Age: 79
End: 2024-10-17
Payer: MEDICARE

## 2024-10-17 NOTE — TELEPHONE ENCOUNTER
Hub staff attempted to follow warm transfer process and was unsuccessful     Caller: GADIEL SHANELL    Relationship to patient: DAUGHTER    Best call back number: [phone]     Patient is needing: MS. REECE CALLED REGARDING HER MOTHER MS. URENA. MS. URENA IS PRESENTLY UNDERGOING CHEMO AND ALSO HAD HER ESOPHAGUS STRETCHED OCT 2. SHE IS BACK TO HAVING PAIN WHEN TRYING TO SWALLOW. SHE IS UNABLE TO EAT OR DRINK ANYTHING. SHE IS TAKING VERY SMALL SIPS OF WATER EACH HOUR TO KEEP FLUIDS IN. MS REECE STOPPED BY THE OFFICE YESTERDAY AND WAS ASSURED THAT DR SPEARS WOULD CALL HER EITHER YESTERDAY OR THIS MORNING. SHE CANNOT TAKE HER MEDICATION DUE TO THIS ISSUE. PLEASE REVIEW AND ADVISE.    OK TO CALL BACK ANYTIME

## 2024-10-17 NOTE — TELEPHONE ENCOUNTER
----- Message from Katya Dahl sent at 10/16/2024 11:53 AM EDT -----  Patient had EGD with Dr. Wolf on 10/2/2024  Findings included-  Candidiasis esophagitis with no bleeding.  Moderate Schatzki ring.  Dilated.  Normal stomach.  Normal duodenum.    Would ensure that patient is having improvement with swallowing and she is currently using Protonix 40 mg p.o. daily    Patient currently undergoing chemotherapy for rectal cancer and sees Dr. Velasquez

## 2024-10-17 NOTE — TELEPHONE ENCOUNTER
I called and spoke with patient. She said her swallowing issues have resolved but I advised her to go to the ER if pt worsens. Pt also aware of path results.

## 2024-10-17 NOTE — TELEPHONE ENCOUNTER
I spoke with patient. She is aware of results. Patient states she had trouble swallowing yesterday, but it has improved. I advised patient to go to the ER if symptoms worsen. Patient aware to continue Protonix. Patient states she saw Dr. Velasquez yesterday and will continue care.

## 2025-01-16 ENCOUNTER — TELEPHONE (OUTPATIENT)
Dept: ORTHOPEDIC SURGERY | Facility: CLINIC | Age: 80
End: 2025-01-16
Payer: MEDICARE

## 2025-01-16 NOTE — TELEPHONE ENCOUNTER
Caller: Marnie Parra    Relationship to patient: Self    Best call back number: [phone] (home)     Chief complaint: LEFT KNEE     Type of visit: GEL INJECTION

## 2025-01-22 ENCOUNTER — TELEPHONE (OUTPATIENT)
Dept: ORTHOPEDIC SURGERY | Facility: CLINIC | Age: 80
End: 2025-01-22
Payer: MEDICARE

## 2025-01-22 NOTE — TELEPHONE ENCOUNTER
APPT: 2-3 AT 2:00 PM LT KNEE SYNVISC ONE INJ WITH LUCERO    LAST LT KNEE SYNVISC ONE INJ:  3-20    [facility] PPO =NO PA REQ  ENTERED REF #[id number]

## 2025-01-22 NOTE — TELEPHONE ENCOUNTER
----- Message from Mercy BROWN sent at 1/21/2025 11:24 AM EST -----  No PA Required for left knee Synvisc One.  Okay to schedule when appropriate.  Reference#:  [id number]

## 2025-02-01 NOTE — PROGRESS NOTES
Chief Complaint  Follow-up and Pain of the Left Knee     Subjective      Marnie Parra is a 79 y.o. female who presents to [facility] for follow up on left knee osteoarthritis we have been managing conservatively with intermittent injections. Her last Synvisc injection was on 3/20/2024. She has not had x-rays since 2021 and is here today with visco supplementation approval for Synvisc injection and to update standing x-rays.    Patient presents today and states that the last injection lasted approximately 5 months.  Her pain recurred in August, however she was undergoing chemotherapy for rectal cancer and therefore did not want to have any steroids.  She has finished her first round of chemotherapy and they have recommended repeating around.  She decided to go on and get a gel injection now while she is waiting to see if she is going to continue chemo.    Allergies   Allergen Reactions    Levofloxacin Other (See Comments)     TORE ROTATOR CUFF  Other reaction(s): Rotator cuff tear arthropathy      Propofol GI Intolerance    Dilaudid [Hydromorphone] Other (See Comments), Mental Status Change and Hallucinations     Dilaudid causes the patient to hallucinate.    Gabapentin Other (See Comments) and Myalgia    Adhesive Tape Rash    Celecoxib Unknown - High Severity and Rash        Social History     Socioeconomic History    Marital status:    Tobacco Use    Smoking status: Some Days     Current packs/day: 1.00     Average packs/day: 1 pack/day for 59.1 years (59.1 ttl pk-yrs)     Types: Cigarettes     Start date: 1/5/1966    Smokeless tobacco: Never    Tobacco comments:     Last 7/23/24   Vaping Use    Vaping status: Never Used   Substance and Sexual Activity    Alcohol use: Never    Drug use: Never    Sexual activity: Defer        Tobacco Use: High Risk (2/3/2025)    Patient History     Smoking Tobacco Use: Some Days     Smokeless Tobacco Use: Never     Passive Exposure: Not on  "file     Patient reports tobacco use. Tobacco use can delay healing and complicate the recovery process. Recommended tobacco cessation for optimal healing and health benefits.     I reviewed the patient's chief complaint, history of present illness, review of systems, past medical history, surgical history, family history, social history, medications, and allergy list.     Review of Systems     Constitutional: Denies fevers, chills, weight loss  Cardiovascular: Denies chest pain, shortness of breath  Skin: Denies rashes, acute skin changes  Neurologic: Denies headache, loss of consciousness    Vital Signs:   /90   Pulse 76   Ht 167.6 cm (66\")   Wt 58.5 kg (129 lb)   SpO2 98%   BMI 20.82 kg/m²          Physical Exam  General: Alert. No acute distress    Ortho Exam      General: Alert, no acute distress  Left lower extremity: Mild to moderate knee effusion.  Mild tenderness to lateral right line.  120 degrees of flexion.  0 degrees extension. Knee extensor mechanism intact. Knee stable to varus and valgus stress. Calf soft, nontender. Demonstrates active ankle plantarflexion and dorsiflexion. Sensation intact over the dorsal and plantar foot.  Palpable pedal pulses.             Large Joint Arthrocentesis: L knee  Date/Time: 2/3/2025 10:20 AM  Consent given by: patient  Site marked: site marked  Timeout: Immediately prior to procedure a time out was called to verify the correct patient, procedure, equipment, support staff and site/side marked as required   Supporting Documentation  Indications: pain   Procedure Details  Location: knee - L knee  Preparation: Patient was prepped and draped in the usual sterile fashion  Needle gauge: 21 G.  Approach: lateral  Medications administered: 48 mg hylan 48 MG/6ML  Patient tolerance: patient tolerated the procedure well with no immediate complications      This injection documentation was Scribed for Sarina Matta PA-C by Stephy Tavarez.  02/03/25   10:20 " EST        Imaging Results (Most Recent)       Procedure Component Value Units Date/Time    XR Knee 3 View Left [[id number]] Resulted: 02/03/25 1220     Updated: 02/03/25 1220    Narrative:      X-Ray Report:  Study: X-rays ordered, taken in the office, and reviewed today  Indication: Left knee pain  View: AP/Lateral, Standing, and [place] view(s)  Findings: At least moderate osteoarthritis in lateral and medial joint   spaces, with near bone-on-bone on the lateral joint space.  Some bone   spurs of the lateral femur/tibia.  No acute fractures.  Prior studies available for comparison: yes               Result Review :       XR Knee 3 View Left    Result Date: 2/3/2025  Narrative: X-Ray Report: Study: X-rays ordered, taken in the office, and reviewed today Indication: Left knee pain View: AP/Lateral, Standing, and [place] view(s) Findings: At least moderate osteoarthritis in lateral and medial joint spaces, with near bone-on-bone on the lateral joint space.  Some bone spurs of the lateral femur/tibia.  No acute fractures. Prior studies available for comparison: yes             Assessment and Plan     Diagnoses and all orders for this visit:    1. Primary osteoarthritis of left knee (Primary)    2. Left knee pain, unspecified chronicity  -     XR Knee 3 View Left    Other orders  -     Large Joint Arthrocentesis: L knee        X rays obtained and reviewed with patient which show at least moderate osteoarthritis, worse in the lateral aspect with near bone-on-bone.  X-rays reviewed with Dr. Álvarez.  Patient will be a candidate for total knee arthroplasty whenever she is ready pending her overall health..     She would like to continue with conservative management as she has had good luck with the gel injections in the past.  We discussed steroid injection, however with her immune suppressed state on recent chemotherapy she would like to speak with her oncologist about this before agreeing to that in the future.    Patient  received synvisc injection today in office in left knee  and tolerated the procedure well without complication.  Counseled that these injections can be given every 6 months and 1 day with insurance approval. Pt can also receive steroid injections intermittently between these doses, with at least 6 weeks between the two.  Steroid injections can be given every 3 months.    Discussed the risk, benefits and alternatives of injection.  Discussed potential complications such as increased pain, swelling and tenderness at the injection site.  Possibility of reaction.  Possibility that injection may not improve pain. Risk of infection.  Possibility of worsening pain after injection.  Patient verbalized understanding and gives verbal consent to proceed.     Follow-up offered.  They will prefer to call back as needed.  Call with questions, concerns, or worsening symptoms.     Follow Up   There are no Patient Instructions on file for this visit.        Patient was given instructions and counseling regarding her condition or for health maintenance advice. Please see specific information pulled into the AVS if appropriate.     Sarina Matta PA-C   02/03/2025  14:06 EST    Dictated Utilizing Dragon Dictation. Please note that portions of this note were completed with a voice recognition program. Part of this note may be an electronic transcription/translation of spoken language to printed text using the Dragon Dictation System.

## 2025-02-03 ENCOUNTER — OFFICE VISIT (OUTPATIENT)
Dept: ORTHOPEDIC SURGERY | Facility: CLINIC | Age: 80
End: 2025-02-03
Payer: MEDICARE

## 2025-02-03 VITALS
OXYGEN SATURATION: 98 % | DIASTOLIC BLOOD PRESSURE: 90 MMHG | SYSTOLIC BLOOD PRESSURE: 170 MMHG | WEIGHT: 129 LBS | BODY MASS INDEX: 20.73 KG/M2 | HEART RATE: 76 BPM | HEIGHT: 66 IN

## 2025-02-03 DIAGNOSIS — M25.562 LEFT KNEE PAIN, UNSPECIFIED CHRONICITY: ICD-10-CM

## 2025-02-03 DIAGNOSIS — M17.12 PRIMARY OSTEOARTHRITIS OF LEFT KNEE: Primary | ICD-10-CM

## 2025-02-03 RX ORDER — SULFAMETHOXAZOLE AND TRIMETHOPRIM 800; 160 MG/1; MG/1
TABLET ORAL EVERY 12 HOURS SCHEDULED
COMMUNITY
Start: 2024-08-20 | End: 2025-02-03

## 2025-02-03 RX ORDER — AMLODIPINE BESYLATE 2.5 MG/1
1 TABLET ORAL DAILY
COMMUNITY

## 2025-02-03 RX ORDER — MAGNESIUM OXIDE 400 MG/1
TABLET ORAL EVERY 24 HOURS
COMMUNITY

## 2025-02-03 RX ORDER — METOPROLOL SUCCINATE 25 MG/1
1 TABLET, EXTENDED RELEASE ORAL DAILY
COMMUNITY

## 2025-02-03 RX ORDER — DULOXETIN HYDROCHLORIDE 60 MG/1
1 CAPSULE, DELAYED RELEASE ORAL EVERY 24 HOURS
COMMUNITY

## 2025-02-03 RX ORDER — SUCRALFATE 1 G/1
TABLET ORAL EVERY 12 HOURS SCHEDULED
COMMUNITY
Start: 2024-10-01

## 2025-02-03 RX ORDER — OLANZAPINE 5 MG/1
TABLET ORAL
COMMUNITY
Start: 2024-12-04

## 2025-02-03 RX ORDER — PSEUDOEPHEDRINE HCL 30 MG
100 TABLET ORAL
COMMUNITY

## 2025-02-03 RX ORDER — DIPHENOXYLATE HCL/ATROPINE 2.5-.025MG
TABLET ORAL EVERY 6 HOURS SCHEDULED
COMMUNITY
Start: 2024-11-07

## 2025-02-03 RX ORDER — CAPECITABINE 500 MG/1
TABLET, FILM COATED ORAL EVERY 12 HOURS SCHEDULED
COMMUNITY
Start: 2025-01-02

## 2025-02-27 ENCOUNTER — APPOINTMENT (OUTPATIENT)
Dept: CT IMAGING | Facility: HOSPITAL | Age: 80
End: 2025-02-27
Payer: MEDICARE

## 2025-02-27 ENCOUNTER — HOSPITAL ENCOUNTER (INPATIENT)
Facility: HOSPITAL | Age: 80
LOS: 11 days | Discharge: HOME | End: 2025-03-10
Attending: EMERGENCY MEDICINE | Admitting: INTERNAL MEDICINE
Payer: MEDICARE

## 2025-02-27 DIAGNOSIS — E86.0 DEHYDRATION: Primary | ICD-10-CM

## 2025-02-27 DIAGNOSIS — R26.2 DIFFICULTY IN WALKING: ICD-10-CM

## 2025-02-27 DIAGNOSIS — R13.10 DYSPHAGIA, UNSPECIFIED TYPE: ICD-10-CM

## 2025-02-27 LAB
ALBUMIN SERPL-MCNC: 3.8 G/DL (ref 3.5–5.2)
ALBUMIN/GLOB SERPL: 1.5 G/DL
ALP SERPL-CCNC: 94 U/L (ref 39–117)
ALT SERPL W P-5'-P-CCNC: 6 U/L (ref 1–33)
AMORPH URATE CRY URNS QL MICRO: ABNORMAL /HPF
ANION GAP SERPL CALCULATED.3IONS-SCNC: 10.6 MMOL/L (ref 5–15)
AST SERPL-CCNC: 12 U/L (ref 1–32)
BACTERIA UR QL AUTO: ABNORMAL /HPF
BASOPHILS # BLD AUTO: 0 10*3/MM3 (ref 0–0.2)
BASOPHILS NFR BLD AUTO: 0 % (ref 0–1.5)
BILIRUB SERPL-MCNC: 1 MG/DL (ref 0–1.2)
BILIRUB UR QL STRIP: NEGATIVE
BUN SERPL-MCNC: 23 MG/DL (ref 8–23)
BUN/CREAT SERPL: 25.6 (ref 7–25)
CALCIUM SPEC-SCNC: 8.6 MG/DL (ref 8.6–10.5)
CHLORIDE SERPL-SCNC: 100 MMOL/L (ref 98–107)
CLARITY UR: ABNORMAL
CO2 SERPL-SCNC: 25.4 MMOL/L (ref 22–29)
COLOR UR: YELLOW
CREAT SERPL-MCNC: 0.9 MG/DL (ref 0.57–1)
D-LACTATE SERPL-SCNC: 1.5 MMOL/L (ref 0.5–2)
DEPRECATED RDW RBC AUTO: 44.5 FL (ref 37–54)
EGFRCR SERPLBLD CKD-EPI 2021: 65.6 ML/MIN/1.73
EOSINOPHIL # BLD AUTO: 0.07 10*3/MM3 (ref 0–0.4)
EOSINOPHIL NFR BLD AUTO: 2.2 % (ref 0.3–6.2)
ERYTHROCYTE [DISTWIDTH] IN BLOOD BY AUTOMATED COUNT: 14.3 % (ref 12.3–15.4)
FLUAV SUBTYP SPEC NAA+PROBE: NOT DETECTED
FLUBV RNA ISLT QL NAA+PROBE: NOT DETECTED
GLOBULIN UR ELPH-MCNC: 2.6 GM/DL
GLUCOSE SERPL-MCNC: 122 MG/DL (ref 65–99)
GLUCOSE UR STRIP-MCNC: NEGATIVE MG/DL
HCT VFR BLD AUTO: 37.7 % (ref 34–46.6)
HGB BLD-MCNC: 12.1 G/DL (ref 12–15.9)
HGB UR QL STRIP.AUTO: ABNORMAL
HOLD SPECIMEN: NORMAL
HOLD SPECIMEN: NORMAL
HYALINE CASTS UR QL AUTO: ABNORMAL /LPF
IMM GRANULOCYTES # BLD AUTO: 0.01 10*3/MM3 (ref 0–0.05)
IMM GRANULOCYTES NFR BLD AUTO: 0.3 % (ref 0–0.5)
KETONES UR QL STRIP: ABNORMAL
LEUKOCYTE ESTERASE UR QL STRIP.AUTO: ABNORMAL
LIPASE SERPL-CCNC: 14 U/L (ref 13–60)
LYMPHOCYTES # BLD AUTO: 0.42 10*3/MM3 (ref 0.7–3.1)
LYMPHOCYTES NFR BLD AUTO: 13.4 % (ref 19.6–45.3)
MCH RBC QN AUTO: 27.8 PG (ref 26.6–33)
MCHC RBC AUTO-ENTMCNC: 32.1 G/DL (ref 31.5–35.7)
MCV RBC AUTO: 86.7 FL (ref 79–97)
MONOCYTES # BLD AUTO: 0.19 10*3/MM3 (ref 0.1–0.9)
MONOCYTES NFR BLD AUTO: 6.1 % (ref 5–12)
NEUTROPHILS NFR BLD AUTO: 2.44 10*3/MM3 (ref 1.7–7)
NEUTROPHILS NFR BLD AUTO: 78 % (ref 42.7–76)
NITRITE UR QL STRIP: POSITIVE
NRBC BLD AUTO-RTO: 0 /100 WBC (ref 0–0.2)
PH UR STRIP.AUTO: 6 [PH] (ref 5–8)
PLATELET # BLD AUTO: 146 10*3/MM3 (ref 140–450)
PMV BLD AUTO: 10 FL (ref 6–12)
POTASSIUM SERPL-SCNC: 3.7 MMOL/L (ref 3.5–5.2)
PROT SERPL-MCNC: 6.4 G/DL (ref 6–8.5)
PROT UR QL STRIP: ABNORMAL
RBC # BLD AUTO: 4.35 10*6/MM3 (ref 3.77–5.28)
RBC # UR STRIP: ABNORMAL /HPF
REF LAB TEST METHOD: ABNORMAL
RSV RNA NPH QL NAA+NON-PROBE: NOT DETECTED
SARS-COV-2 RNA RESP QL NAA+PROBE: NOT DETECTED
SODIUM SERPL-SCNC: 136 MMOL/L (ref 136–145)
SP GR UR STRIP: 1.02 (ref 1–1.03)
SQUAMOUS #/AREA URNS HPF: ABNORMAL /HPF
UROBILINOGEN UR QL STRIP: ABNORMAL
WBC # UR STRIP: ABNORMAL /HPF
WBC NRBC COR # BLD AUTO: 3.13 10*3/MM3 (ref 3.4–10.8)
WHOLE BLOOD HOLD COAG: NORMAL
WHOLE BLOOD HOLD SPECIMEN: NORMAL

## 2025-02-27 PROCEDURE — 25010000002 MORPHINE PER 10 MG: Performed by: EMERGENCY MEDICINE

## 2025-02-27 PROCEDURE — 80053 COMPREHEN METABOLIC PANEL: CPT | Performed by: EMERGENCY MEDICINE

## 2025-02-27 PROCEDURE — 25510000001 IOPAMIDOL PER 1 ML: Performed by: EMERGENCY MEDICINE

## 2025-02-27 PROCEDURE — 25810000003 SODIUM CHLORIDE 0.9 % SOLUTION: Performed by: EMERGENCY MEDICINE

## 2025-02-27 PROCEDURE — 85025 COMPLETE CBC W/AUTO DIFF WBC: CPT | Performed by: EMERGENCY MEDICINE

## 2025-02-27 PROCEDURE — 99285 EMERGENCY DEPT VISIT HI MDM: CPT

## 2025-02-27 PROCEDURE — 25010000002 DICYCLOMINE PER 20 MG: Performed by: EMERGENCY MEDICINE

## 2025-02-27 PROCEDURE — 74177 CT ABD & PELVIS W/CONTRAST: CPT

## 2025-02-27 PROCEDURE — 83690 ASSAY OF LIPASE: CPT | Performed by: EMERGENCY MEDICINE

## 2025-02-27 PROCEDURE — 25010000002 ONDANSETRON PER 1 MG: Performed by: INTERNAL MEDICINE

## 2025-02-27 PROCEDURE — 25810000003 LACTATED RINGERS PER 1000 ML: Performed by: INTERNAL MEDICINE

## 2025-02-27 PROCEDURE — 36415 COLL VENOUS BLD VENIPUNCTURE: CPT

## 2025-02-27 PROCEDURE — 83605 ASSAY OF LACTIC ACID: CPT | Performed by: EMERGENCY MEDICINE

## 2025-02-27 PROCEDURE — 81001 URINALYSIS AUTO W/SCOPE: CPT | Performed by: EMERGENCY MEDICINE

## 2025-02-27 PROCEDURE — 87040 BLOOD CULTURE FOR BACTERIA: CPT | Performed by: EMERGENCY MEDICINE

## 2025-02-27 PROCEDURE — 25010000002 ONDANSETRON PER 1 MG: Performed by: EMERGENCY MEDICINE

## 2025-02-27 PROCEDURE — 25010000002 CEFTRIAXONE PER 250 MG: Performed by: EMERGENCY MEDICINE

## 2025-02-27 PROCEDURE — 36415 COLL VENOUS BLD VENIPUNCTURE: CPT | Performed by: EMERGENCY MEDICINE

## 2025-02-27 PROCEDURE — 87637 SARSCOV2&INF A&B&RSV AMP PRB: CPT | Performed by: EMERGENCY MEDICINE

## 2025-02-27 RX ORDER — ONDANSETRON 2 MG/ML
4 INJECTION INTRAMUSCULAR; INTRAVENOUS ONCE
Status: COMPLETED | OUTPATIENT
Start: 2025-02-27 | End: 2025-02-27

## 2025-02-27 RX ORDER — ONDANSETRON 8 MG/1
8 TABLET, ORALLY DISINTEGRATING ORAL EVERY 8 HOURS PRN
COMMUNITY
Start: 2024-12-04 | End: 2025-03-10 | Stop reason: HOSPADM

## 2025-02-27 RX ORDER — POLYETHYLENE GLYCOL 3350 17 G/17G
17 POWDER, FOR SOLUTION ORAL DAILY PRN
Status: DISCONTINUED | OUTPATIENT
Start: 2025-02-27 | End: 2025-03-01

## 2025-02-27 RX ORDER — SODIUM CHLORIDE 0.9 % (FLUSH) 0.9 %
10 SYRINGE (ML) INJECTION AS NEEDED
Status: DISCONTINUED | OUTPATIENT
Start: 2025-02-27 | End: 2025-03-10 | Stop reason: HOSPADM

## 2025-02-27 RX ORDER — ALUMINA, MAGNESIA, AND SIMETHICONE 2400; 2400; 240 MG/30ML; MG/30ML; MG/30ML
15 SUSPENSION ORAL EVERY 6 HOURS PRN
Status: DISCONTINUED | OUTPATIENT
Start: 2025-02-27 | End: 2025-03-10 | Stop reason: HOSPADM

## 2025-02-27 RX ORDER — BISACODYL 10 MG
10 SUPPOSITORY, RECTAL RECTAL DAILY PRN
Status: DISCONTINUED | OUTPATIENT
Start: 2025-02-27 | End: 2025-03-01

## 2025-02-27 RX ORDER — SODIUM CHLORIDE, SODIUM LACTATE, POTASSIUM CHLORIDE, CALCIUM CHLORIDE 600; 310; 30; 20 MG/100ML; MG/100ML; MG/100ML; MG/100ML
100 INJECTION, SOLUTION INTRAVENOUS CONTINUOUS
Status: ACTIVE | OUTPATIENT
Start: 2025-02-27 | End: 2025-03-02

## 2025-02-27 RX ORDER — ONDANSETRON 2 MG/ML
4 INJECTION INTRAMUSCULAR; INTRAVENOUS EVERY 6 HOURS PRN
Status: DISCONTINUED | OUTPATIENT
Start: 2025-02-27 | End: 2025-03-10 | Stop reason: HOSPADM

## 2025-02-27 RX ORDER — SODIUM CHLORIDE 0.9 % (FLUSH) 0.9 %
10 SYRINGE (ML) INJECTION EVERY 12 HOURS SCHEDULED
Status: DISCONTINUED | OUTPATIENT
Start: 2025-02-27 | End: 2025-03-10 | Stop reason: HOSPADM

## 2025-02-27 RX ORDER — TRAMADOL HYDROCHLORIDE 50 MG/1
50 TABLET ORAL EVERY 6 HOURS PRN
Status: DISCONTINUED | OUTPATIENT
Start: 2025-02-27 | End: 2025-03-01

## 2025-02-27 RX ORDER — AMOXICILLIN 250 MG
2 CAPSULE ORAL 2 TIMES DAILY
Status: DISCONTINUED | OUTPATIENT
Start: 2025-02-27 | End: 2025-03-01

## 2025-02-27 RX ORDER — IOPAMIDOL 755 MG/ML
100 INJECTION, SOLUTION INTRAVASCULAR
Status: COMPLETED | OUTPATIENT
Start: 2025-02-27 | End: 2025-02-27

## 2025-02-27 RX ORDER — BISACODYL 5 MG/1
5 TABLET, DELAYED RELEASE ORAL DAILY PRN
Status: DISCONTINUED | OUTPATIENT
Start: 2025-02-27 | End: 2025-03-01

## 2025-02-27 RX ORDER — LOPERAMIDE HYDROCHLORIDE 2 MG/1
4 CAPSULE ORAL 3 TIMES DAILY PRN
Status: DISCONTINUED | OUTPATIENT
Start: 2025-02-27 | End: 2025-03-01

## 2025-02-27 RX ORDER — ACETAMINOPHEN 325 MG/1
650 TABLET ORAL EVERY 6 HOURS PRN
Status: DISCONTINUED | OUTPATIENT
Start: 2025-02-27 | End: 2025-03-10 | Stop reason: HOSPADM

## 2025-02-27 RX ORDER — SODIUM CHLORIDE 9 MG/ML
40 INJECTION, SOLUTION INTRAVENOUS AS NEEDED
Status: DISCONTINUED | OUTPATIENT
Start: 2025-02-27 | End: 2025-03-10 | Stop reason: HOSPADM

## 2025-02-27 RX ORDER — DICYCLOMINE HYDROCHLORIDE 10 MG/ML
20 INJECTION INTRAMUSCULAR ONCE
Status: COMPLETED | OUTPATIENT
Start: 2025-02-27 | End: 2025-02-27

## 2025-02-27 RX ADMIN — DICYCLOMINE HYDROCHLORIDE 20 MG: 10 INJECTION, SOLUTION INTRAMUSCULAR at 10:04

## 2025-02-27 RX ADMIN — SODIUM CHLORIDE 1000 MG: 9 INJECTION INTRAMUSCULAR; INTRAVENOUS; SUBCUTANEOUS at 12:49

## 2025-02-27 RX ADMIN — ONDANSETRON 4 MG: 2 INJECTION INTRAMUSCULAR; INTRAVENOUS at 10:02

## 2025-02-27 RX ADMIN — MORPHINE SULFATE 4 MG: 4 INJECTION, SOLUTION INTRAMUSCULAR; INTRAVENOUS at 10:02

## 2025-02-27 RX ADMIN — ONDANSETRON 4 MG: 2 INJECTION INTRAMUSCULAR; INTRAVENOUS at 19:34

## 2025-02-27 RX ADMIN — Medication 10 ML: at 23:49

## 2025-02-27 RX ADMIN — IOPAMIDOL 100 ML: 755 INJECTION, SOLUTION INTRAVENOUS at 10:44

## 2025-02-27 RX ADMIN — SODIUM CHLORIDE, SODIUM LACTATE, POTASSIUM CHLORIDE, CALCIUM CHLORIDE 100 ML/HR: 20; 30; 600; 310 INJECTION, SOLUTION INTRAVENOUS at 19:31

## 2025-02-27 RX ADMIN — SODIUM CHLORIDE 1000 ML: 9 INJECTION, SOLUTION INTRAVENOUS at 10:01

## 2025-02-27 RX ADMIN — LOPERAMIDE HYDROCHLORIDE 4 MG: 2 CAPSULE ORAL at 17:59

## 2025-02-27 RX ADMIN — ACETAMINOPHEN 650 MG: 325 TABLET ORAL at 23:48

## 2025-02-27 RX ADMIN — TRAMADOL HYDROCHLORIDE 50 MG: 50 TABLET, COATED ORAL at 17:59

## 2025-02-27 RX ADMIN — SODIUM CHLORIDE 1000 ML: 9 INJECTION, SOLUTION INTRAVENOUS at 12:03

## 2025-02-27 NOTE — ED PROVIDER NOTES
Time: 9:33 AM EST  Date of encounter:  2/27/2025  Independent Historian/Clinical History and Information was obtained by:   Patient and Family    History is limited by: N/A    Chief Complaint: Vomiting and diarrhea      History of Present Illness:  Patient is a 79 y.o. year old female who presents to the emergency department for evaluation of vomiting and diarrhea that is gotten worse over the past 2 days.  Patient denies fever and chills.  Patient does report some lower abdominal pain.  Patient has no dysuria or urinary frequency.  Patient denies cough and hemoptysis.      Patient Care Team  Primary Care Provider: Nicolas Velasquez MD    Past Medical History:     Allergies   Allergen Reactions    Levofloxacin Other (See Comments)     TORE ROTATOR CUFF  Other reaction(s): Rotator cuff tear arthropathy      Propofol GI Intolerance    Dilaudid [Hydromorphone] Other (See Comments), Mental Status Change and Hallucinations     Dilaudid causes the patient to hallucinate.    Gabapentin Other (See Comments) and Myalgia    Adhesive Tape Rash    Celecoxib Unknown - High Severity and Rash     Past Medical History:   Diagnosis Date    Arthritis     Cancer     bladder    GERD (gastroesophageal reflux disease)     Hyperlipidemia     Hypertension     Kidney stone     PONV (postoperative nausea and vomiting)     Stroke     2016 left side     Past Surgical History:   Procedure Laterality Date    ABDOMINAL SURGERY      APPENDECTOMY      COLONOSCOPY      CYSTECTOMY      bladder removal, with pouch made from stomach    ENDOSCOPY N/A 10/2/2024    Procedure: ESOPHAGOGASTRODUODENOSCOPY WITH BIOPSIES AND 15-18MM BALLOON DILATATION;  Surgeon: Gilberto Wolf MD;  Location: [facility] ENDOSCOPY;  Service: Gastroenterology;  Laterality: N/A;  HIATAL HERNIA, SCHATZKI'S RING, FUNGAL ESOPHAGITIS    HERNIA REPAIR      HYSTERECTOMY      KIDNEY STONE SURGERY      SIGMOIDOSCOPY N/A 7/3/2024    Procedure: FLEXIBLE SIGMOIDOSCOPY TO TRANSVERSE COLON  WITH BIOPSIES;  Surgeon: Darius Andujar MD;  Location: [facility] ENDOSCOPY;  Service: Gastroenterology;  Laterality: N/A;  RECTAL MASS, DIVERTICULOSIS    TUBAL ABDOMINAL LIGATION      VENOUS ACCESS DEVICE (PORT) INSERTION Right 7/23/2024    Procedure: INSERTION VENOUS ACCESS DEVICE;  Surgeon: Luis Savage MD;  Location: [facility] MAIN OR;  Service: General;  Laterality: Right;     History reviewed. No pertinent family history.    Home Medications:  Prior to Admission medications    Medication Sig Start Date End Date Taking? Authorizing Provider   acetaminophen (TYLENOL) 500 MG tablet Take 2 tablets by mouth Every 4 (Four) to 6 (Six) Hours As Needed for Mild Pain (Max 4000 mg daily).  Patient not taking: Reported on 2/3/2025    Pavel Sanon MD   amLODIPine (Norvasc) 2.5 MG tablet Take 1 tablet by mouth Daily.  Patient not taking: Reported on 2/3/2025    Pavel Sanon MD   capecitabine (XELODA) 500 MG chemo tablet Every 12 (Twelve) Hours.  Patient not taking: Reported on 2/3/2025 1/2/25   Pavel Sanon MD   docusate sodium 100 MG capsule Take 1 capsule by mouth.  Patient not taking: Reported on 2/3/2025    Pavel Sanon MD   DULoxetine (Cymbalta) 60 MG capsule 1 capsule Daily.  Patient not taking: Reported on 2/3/2025    Pavel Sanon MD   Lomotil 2.5-0.025 MG per tablet Every 6 (Six) Hours.  Patient not taking: Reported on 2/3/2025 11/7/24   Pavel Sanon MD   magnesium oxide (MAG-OX) 400 MG tablet Daily.  Patient not taking: Reported on 2/3/2025    Pavel Sanon MD   metoclopramide (REGLAN) 5 MG tablet Take 1 tablet by mouth 4 (Four) Times a Day Before Meals & at Bedtime.    Pavel Sanon MD   metoprolol succinate XL (TOPROL-XL) 25 MG 24 hr tablet Take 1 tablet by mouth Daily.  Patient not taking: Reported on 2/3/2025    Pavel Sanon MD   OLANZapine (zyPREXA) 5 MG tablet 1 tablet orally once a day 30 days  Patient not taking: Reported on  2/3/2025 12/4/24   Pavel Sanon MD   ondansetron ODT (ZOFRAN-ODT) 4 MG disintegrating tablet Place 1 tablet on the tongue Every 6 (Six) Hours As Needed for Vomiting.  Patient not taking: Reported on 2/3/2025 8/17/24   Fletcher Bower MD   pantoprazole (PROTONIX) 40 MG EC tablet Take 1 tablet by mouth As Needed. 9/29/21   Pavel Sanon MD   pantoprazole (PROTONIX) 40 MG EC tablet Take 1 tablet by mouth Daily. 10/2/24   Gilberto Wolf MD   sertraline (ZOLOFT) 25 MG tablet Take 1 tablet by mouth Every Night.    Pavel Sanon MD   sucralfate (CARAFATE) 1 g tablet Every 12 (Twelve) Hours.  Patient not taking: Reported on 2/3/2025 10/1/24   Pavel Sanon MD   traMADol (Ultram) 50 MG tablet Take 1 tablet by mouth Every 6 (Six) Hours As Needed for Moderate Pain or Severe Pain. 7/23/24 7/23/25  Luis Savage MD        Social History:   Social History     Tobacco Use    Smoking status: Some Days     Current packs/day: 1.00     Average packs/day: 1 pack/day for 59.1 years (59.1 ttl pk-yrs)     Types: Cigarettes     Start date: 1/5/1966    Smokeless tobacco: Never    Tobacco comments:     Last 7/23/24   Vaping Use    Vaping status: Never Used   Substance Use Topics    Alcohol use: Never    Drug use: Never         Review of Systems:  Review of Systems   Constitutional:  Negative for chills and fever.   HENT:  Negative for congestion, rhinorrhea and sore throat.    Eyes:  Negative for pain and visual disturbance.   Respiratory:  Negative for apnea, cough, chest tightness and shortness of breath.    Cardiovascular:  Negative for chest pain and palpitations.   Gastrointestinal:  Positive for diarrhea, nausea and vomiting. Negative for abdominal pain.   Genitourinary:  Negative for difficulty urinating and dysuria.   Musculoskeletal:  Negative for joint swelling and myalgias.   Skin:  Negative for color change.   Neurological:  Negative for seizures and headaches.  "  Psychiatric/Behavioral: Negative.     All other systems reviewed and are negative.       Physical Exam:  /52 (BP Location: Left arm, Patient Position: Sitting)   Pulse 88   Temp 98.5 °F (36.9 °C) (Oral)   Resp 18   Ht 167.6 cm (66\")   Wt 57.4 kg (126 lb 8.7 oz)   SpO2 96%   BMI 20.42 kg/m²     Physical Exam  Vitals and nursing note reviewed.   Constitutional:       General: She is not in acute distress.     Appearance: Normal appearance. She is not toxic-appearing.   HENT:      Head: Normocephalic and atraumatic.      Jaw: There is normal jaw occlusion.   Eyes:      General: Lids are normal.      Extraocular Movements: Extraocular movements intact.      Conjunctiva/sclera: Conjunctivae normal.      Pupils: Pupils are equal, round, and reactive to light.   Cardiovascular:      Rate and Rhythm: Normal rate and regular rhythm.      Pulses: Normal pulses.      Heart sounds: Normal heart sounds.   Pulmonary:      Effort: Pulmonary effort is normal. No respiratory distress.      Breath sounds: Normal breath sounds. No wheezing or rhonchi.   Abdominal:      General: Abdomen is flat.      Palpations: Abdomen is soft.      Tenderness: There is abdominal tenderness in the left lower quadrant. There is no guarding or rebound.   Musculoskeletal:         General: Normal range of motion.      Cervical back: Normal range of motion and neck supple.      Right lower leg: No edema.      Left lower leg: No edema.   Skin:     General: Skin is warm and dry.   Neurological:      Mental Status: She is alert and oriented to person, place, and time. Mental status is at baseline.   Psychiatric:         Mood and Affect: Mood normal.                    Medical Decision Making:      Comorbidities that affect care:    Cancer    External Notes reviewed:    Previous Clinic Note: Patient was last seen in clinic for follow-up of knee pain.      The following orders were placed and all results were independently analyzed by me:  Orders " Placed This Encounter   Procedures    COVID PRE-OP / PRE-PROCEDURE SCREENING ORDER (NO ISOLATION) - Swab, Nasopharynx    COVID-19, FLU A/B, RSV PCR 1 HR TAT - Swab, Nasopharynx    Blood Culture - Blood,    Blood Culture - Blood,    CT Abdomen Pelvis With Contrast    [place] Draw    Comprehensive Metabolic Panel    Lipase    Urinalysis With Microscopic If Indicated (No Culture) - Urine, Clean Catch    Lactic Acid, Plasma    CBC Auto Differential    Urinalysis, Microscopic Only - Urine, Clean Catch    NPO Diet NPO Type: Strict NPO    Undress & Gown    General MD Inpatient Consult    Insert Peripheral IV    Initiate Observation Status    CBC & Differential    Green Top (Gel)    Lavender Top    Gold Top - SST    Light Blue Top       Medications Given in the Emergency Department:  Medications   sodium chloride 0.9 % flush 10 mL (has no administration in time range)   sodium chloride 0.9 % bolus 1,000 mL (0 mL Intravenous Stopped 2/27/25 1031)   ondansetron (ZOFRAN) injection 4 mg (4 mg Intravenous Given 2/27/25 1002)   morphine injection 4 mg (4 mg Intravenous Given 2/27/25 1002)   dicyclomine (BENTYL) injection 20 mg (20 mg Intramuscular Given 2/27/25 1004)   iopamidol (ISOVUE-370) 76 % injection 100 mL (100 mL Intravenous Given 2/27/25 1044)   sodium chloride 0.9 % bolus 1,000 mL (0 mL Intravenous Stopped 2/27/25 1233)   cefTRIAXone (ROCEPHIN) in NS 1 gram/10ml IV PUSH syringe (1,000 mg Intravenous Given 2/27/25 1249)        ED Course:         Labs:    Lab Results (last 24 hours)       Procedure Component Value Units Date/Time    Urinalysis With Microscopic If Indicated (No Culture) - Urine, Clean Catch [[id number]]  (Abnormal) Collected: 02/27/25 0916    Specimen: Urine, Clean Catch Updated: 02/27/25 0933     Color, UA Yellow     Appearance, UA Cloudy     pH, UA 6.0     Specific Gravity, UA 1.017     Glucose, UA Negative     Ketones, UA 15 mg/dL (1+)     Bilirubin, UA Negative     Blood, UA Moderate (2+)     Protein,  UA >=300 mg/dL (3+)     Leuk Esterase, UA Moderate (2+)     Nitrite, UA Positive     Urobilinogen, UA 0.2 E.U./dL    Urinalysis, Microscopic Only - Urine, Clean Catch [[id number]]  (Abnormal) Collected: 02/27/25 0916    Specimen: Urine, Clean Catch Updated: 02/27/25 0943     RBC, UA 6-10 /HPF      WBC, UA 21-50 /HPF      Bacteria, UA 3+ /HPF      Squamous Epithelial Cells, UA None Seen /HPF      Hyaline Casts, UA None Seen /LPF      Amorphous Crystals, UA Small/1+ /HPF      Methodology Manual Light Microscopy    CBC & Differential [[id number]]  (Abnormal) Collected: 02/27/25 0920    Specimen: Blood Updated: 02/27/25 0928    Narrative:      The following orders were created for panel order CBC & Differential.  Procedure                               Abnormality         Status                     ---------                               -----------         ------                     CBC Auto Differential[[id number]]        Abnormal            Final result                 Please view results for these tests on the individual orders.    Comprehensive Metabolic Panel [[id number]]  (Abnormal) Collected: 02/27/25 0920    Specimen: Blood Updated: 02/27/25 0949     Glucose 122 mg/dL      BUN 23 mg/dL      Creatinine 0.90 mg/dL      Sodium 136 mmol/L      Potassium 3.7 mmol/L      Chloride 100 mmol/L      CO2 25.4 mmol/L      Calcium 8.6 mg/dL      Total Protein 6.4 g/dL      Albumin 3.8 g/dL      ALT (SGPT) 6 U/L      AST (SGOT) 12 U/L      Alkaline Phosphatase 94 U/L      Total Bilirubin 1.0 mg/dL      Globulin 2.6 gm/dL      A/G Ratio 1.5 g/dL      BUN/Creatinine Ratio 25.6     Anion Gap 10.6 mmol/L      eGFR 65.6 mL/min/1.73     Narrative:      GFR Categories in Chronic Kidney Disease (CKD)      GFR Category          GFR (mL/min/1.73)    Interpretation  G1                     90 or greater         Normal or high (1)  G2                      60-89                Mild decrease (1)  G3a                   45-59                Mild  to moderate decrease  G3b                   30-44                Moderate to severe decrease  G4                    15-29                Severe decrease  G5                    14 or less           Kidney failure          (1)In the absence of evidence of kidney disease, neither GFR category G1 or G2 fulfill the criteria for CKD.    eGFR calculation 2021 CKD-EPI creatinine equation, which does not include race as a factor    Lipase [[id number]]  (Normal) Collected: 02/27/25 0920    Specimen: Blood Updated: 02/27/25 0949     Lipase 14 U/L     Lactic Acid, Plasma [[id number]]  (Normal) Collected: 02/27/25 0920    Specimen: Blood Updated: 02/27/25 0946     Lactate 1.5 mmol/L     CBC Auto Differential [[id number]]  (Abnormal) Collected: 02/27/25 0920    Specimen: Blood Updated: 02/27/25 0928     WBC 3.13 10*3/mm3      RBC 4.35 10*6/mm3      Hemoglobin 12.1 g/dL      Hematocrit 37.7 %      MCV 86.7 fL      MCH 27.8 pg      MCHC 32.1 g/dL      RDW 14.3 %      RDW-SD 44.5 fl      MPV 10.0 fL      Platelets 146 10*3/mm3      Neutrophil % 78.0 %      Lymphocyte % 13.4 %      Monocyte % 6.1 %      Eosinophil % 2.2 %      Basophil % 0.0 %      Immature Grans % 0.3 %      Neutrophils, Absolute 2.44 10*3/mm3      Lymphocytes, Absolute 0.42 10*3/mm3      Monocytes, Absolute 0.19 10*3/mm3      Eosinophils, Absolute 0.07 10*3/mm3      Basophils, Absolute 0.00 10*3/mm3      Immature Grans, Absolute 0.01 10*3/mm3      nRBC 0.0 /100 WBC     COVID PRE-OP / PRE-PROCEDURE SCREENING ORDER (NO ISOLATION) - Swab, Nasopharynx [[id number]]  (Normal) Collected: 02/27/25 1008    Specimen: Swab from Nasopharynx Updated: 02/27/25 1053    Narrative:      The following orders were created for panel order COVID PRE-OP / PRE-PROCEDURE SCREENING ORDER (NO ISOLATION) - Swab, Nasopharynx.  Procedure                               Abnormality         Status                     ---------                               -----------         ------                      COVID-19, FLU A/B, RSV P...[[id number]]  Normal              Final result                 Please view results for these tests on the individual orders.    COVID-19, FLU A/B, RSV PCR 1 HR TAT - Swab, Nasopharynx [[id number]]  (Normal) Collected: 02/27/25 1008    Specimen: Swab from Nasopharynx Updated: 02/27/25 1053     COVID19 Not Detected     Influenza A PCR Not Detected     Influenza B PCR Not Detected     RSV, PCR Not Detected    Narrative:      Fact sheet for providers: https://www.fda.gov/media/471927/download    Fact sheet for patients: https://www.fda.gov/media/524398/download    Test performed by PCR.    Blood Culture - Blood, Arm, Left [[id number]] Collected: 02/27/25 1205    Specimen: Blood from Arm, Left Updated: 02/27/25 1208    Blood Culture - Blood, Hand, Right [[id number]] Collected: 02/27/25 1231    Specimen: Blood from Hand, Right Updated: 02/27/25 1234             Imaging:    CT Abdomen Pelvis With Contrast    Result Date: 2/27/2025  CT ABDOMEN PELVIS W CONTRAST Date of Exam: 2/27/2025 10:36 AM EST Indication: Abdominal pain with cramping, history of hernia. The patient has a history of rectal and bladder cancer. Comparison: CT of the abdomen/pelvis performed on 8/17/2024. Technique: Axial CT images were obtained of the abdomen and pelvis after the uneventful intravenous administration of iodinated contrast. Reconstructed coronal and sagittal images were also obtained. Automated exposure control and iterative construction methods were used. Findings: The patient again has extensive postsurgical changes. The uterus and urinary bladder are surgically absent. There is a ileal loop in the right lower quadrant. There is ureteral diversion. There is stable moderate right hydronephrosis. The right ureter is  only slightly distended. There are parapelvic cysts in the left kidney with no significant hydronephrosis. There is mild asymmetric thickening of the left lateral wall of the rectum which could  represent either residual tumor or scarring from treated rectal cancer. The patient has bowel wall edema and increased mucosal enhancement involving the descending colon and sigmoid colon. There are similar findings also appreciated within several of the small bowel loops in the mid to lower pelvis. This suggest an underlying enterocolitis. There are diverticula in the sigmoid colon with no evidence of acute diverticulitis. The stomach is not well distended. The wall is probably artificially thickened. There may be some increased mucosal enhancement due to gastritis. There is mesh in the anterior abdominal wall. A loop of the mid transverse colon protrudes into the wid[place]outh abdominal wall defect with no evidence of ischemia or obstruction. There is hepatic steatosis. The gallbladder, pancreas, and spleen are normal. There is nodular thickening of the adrenal glands consistent with benign adenomas. There are atherosclerotic vascular calcifications in the abdomen and pelvis. There are no pathologically enlarged lymph nodes or abnormal fluid collections. The patient has a small hiatal hernia. The lung bases are clear. There are no suspicious osteolytic or sclerotic lesions in the bony structures. There are underlying degenerative changes.     Impression: 1.Extensive postsurgical changes as described. 2.Stable moderate right hydronephrosis with a prominent extrarenal pelvis. There are left-sided parapelvic cysts with no hydronephrosis. There is ureteral diversion secondary to the patient's cystectomy and creation of a right lower quadrant ileal loop. 3.There is mild asymmetric thickening of the left lateral wall of the rectum which could represent either residual tumor or scarring from treated rectal cancer. 4.There is bowel wall edema and increased mucosal enhancement involving the descending colon and sigmoid colon as well as several small bowel loops in the mid to lower pelvis. This suggest an underlying  enterocolitis. There is also a questionable gastritis. 5.There is a loop of the mid transverse colon which protrudes through a widemouth wall defect where the patient has a mesh in place. There is no evidence of ischemia or obstruction. 6.Additional findings as noted above. Electronically Signed: Reese Hickey MD  2/27/2025 11:10 AM EST  Workstation ID: [id number]       Differential Diagnosis and Discussion:    Weakness: Based on the patient's history, signs, and symptoms, the diffential diagnosis includes but is not limited to meningitis, stroke, sepsis, subarachnoid hemorrhage, intracranial bleeding, encephalitis, acute uti, dehydration, MS, myasthenia gravis, Guillan Stanley, migraine variant, neuromuscular disorders vertigo, electrolyte imbalance, and metabolic disorders.    PROCEDURES:    Labs were collected in the emergency department and all labs were reviewed and interpreted by me.  CT scan was performed in the emergency department and the CT scan radiology impression was interpreted by me.    No orders to display       Procedures    MDM     The patient´s CBC that was reviewed and interpreted by me shows no abnormalities of critical concern. Of note, there is no anemia requiring a blood transfusion and the platelet count is acceptable.  The patient´s CMP that was reviewed and interpretted by me shows no abnormalities of critical concern. Of note, the patient´s sodium and potassium are acceptable. The patient´s liver enzymes are unremarkable. The patient´s renal function (creatinine) is preserved. The patient has a normal anion gap.  CT scan abdomen pelvis is negative for acute abdominal pathology.  Patient was given 2 L normal saline Emergency Department.  Patient reports that she still feels very weak and unable to care for herself.                Patient Care Considerations:    CT HEAD: I considered ordering a noncontrast CT of the head, however patient has a normal neurological exam and has no recent  trauma.      Consultants/Shared Management Plan:    Case was discussed with Dr. Cristobal who agrees with admission.    Social Determinants of Health:    Patient is unable to carry out activities of daily life. Escalation of care is necessary.       Disposition and Care Coordination:    Admit:   Through independent evaluation of the patient's history, physical, and imperical data, the patient meets criteria for inpatient admission to the hospital.        Final diagnoses:   Dehydration        ED Disposition       ED Disposition   Decision to Admit    Condition   --    Comment   Level of Care: Med/Surg [1]   Diagnosis: Dehydration [276.51.ICD-9-CM]   Admitting Physician: JACKIE COREA [[id number]]                 This medical record created using voice recognition software.             Valentina Foss MD  02/27/25 [id number]

## 2025-02-27 NOTE — ED NOTES
Patient is a cancer patient and started PO chemo on 2/11 and completed on 2/25, on 2/19-2/20 patient began having diarrhea, this AM patient started vomiting, hasn't drank or ate much since yesterday and is so weak, patient has to self cath herself and has been too weak to even do that requiring her daughter to have to do it, starting yesterday her [place] pouch started pulsating and satya which patient states is abnormal and when it contracts it radiates to right flank area, patient has been extremely dehydrated with dry mouth and lips, currently patient has a blister on her lip but according to patient it is common to have hand, foot, and mouth symptoms, patient saw her oncologist Dr. Velasquez yesterday and was advised to come to the ED this AM to be seen

## 2025-02-27 NOTE — H&P
[facility]   HISTORY AND PHYSICAL    Patient Name: Marnie Parra  : 1945  MRN: [id number]  Primary Care Physician:  Nicolas Velasquez MD  Date of admission: 2025    Subjective   Subjective     Chief Complaint: Patient known to me with a rectal cancer she also has history of bladder cancer previously cystectomy and has a ileal pouch patient now was started on chemotherapy with Decitabine and started having diarrhea and abdominal pain dehydrated and therefore went to the emergency room and appears to be dehydrated and she is therefore being admitted for the IV hydration and controlling the diarrhea    HPI: Him to have a rectal cancer recently has been started on hemotherapy    Marnie Parra is a 79 y.o. female Chemotherapy induced diarrhea and nausea vomiting    Review of Systems   All systems were reviewed and negative except for: Has been reviewed    Personal History     Past Medical History:   Diagnosis Date    Arthritis     Cancer     bladder    GERD (gastroesophageal reflux disease)     Hyperlipidemia     Hypertension     Kidney stone     PONV (postoperative nausea and vomiting)     Stroke     2016 left side       Past Surgical History:   Procedure Laterality Date    ABDOMINAL SURGERY      APPENDECTOMY      COLONOSCOPY      CYSTECTOMY      bladder removal, with pouch made from stomach    ENDOSCOPY N/A 10/2/2024    Procedure: ESOPHAGOGASTRODUODENOSCOPY WITH BIOPSIES AND 15-18MM BALLOON DILATATION;  Surgeon: Gilberto Wolf MD;  Location: [facility] ENDOSCOPY;  Service: Gastroenterology;  Laterality: N/A;  HIATAL HERNIA, SCHATZKI'S RING, FUNGAL ESOPHAGITIS    HERNIA REPAIR      HYSTERECTOMY      KIDNEY STONE SURGERY      SIGMOIDOSCOPY N/A 7/3/2024    Procedure: FLEXIBLE SIGMOIDOSCOPY TO TRANSVERSE COLON WITH BIOPSIES;  Surgeon: Darius Andujar MD;  Location: [facility] ENDOSCOPY;  Service: Gastroenterology;  Laterality: N/A;  RECTAL MASS, DIVERTICULOSIS    TUBAL ABDOMINAL LIGATION       VENOUS ACCESS DEVICE (PORT) INSERTION Right 7/23/2024    Procedure: INSERTION VENOUS ACCESS DEVICE;  Surgeon: Luis Savage MD;  Location: [facility] OR;  Service: General;  Laterality: Right;       Family History: family history is not on file. Otherwise pertinent FHx was reviewed and not pertinent to current issue.    Social History:  reports that she has been smoking cigarettes. She started smoking about 59 years ago. She has a 59.1 pack-year smoking history. She has never used smokeless tobacco. She reports that she does not drink alcohol and does not use drugs.    Home Medications:  capecitabine, diphenoxylate-atropine, ondansetron ODT, pantoprazole, and traMADol      Allergies:  Allergies   Allergen Reactions    Levofloxacin Other (See Comments)     TORE ROTATOR CUFF  Other reaction(s): Rotator cuff tear arthropathy      Propofol GI Intolerance    Dilaudid [Hydromorphone] Other (See Comments), Mental Status Change and Hallucinations     Dilaudid causes the patient to hallucinate.    Gabapentin Other (See Comments) and Myalgia    Adhesive Tape Rash    Celecoxib Unknown - High Severity and Rash       Objective   Objective     Vitals:   Temp:  [98.5 °F (36.9 °C)] 98.5 °F (36.9 °C)  Heart Rate:  [] 90  Resp:  [18] 18  BP: (117-154)/(52-73) 154/73  Physical Exam    Constitutional: alert and  Oriented not in acute distress   Eyes:  pupils Equal reacting to light and accommodation   HENT: Normal   Neck: Normal   Respiratory: No rales or rhonchi   Cardiovascular: S1-S2 normal no S3 or S4   Gastrointestinal: No palpable organomegaly patient has [place] pouch   Musculoskeletal: No deformities   Neurologic: No localizing signs  Skin: No rash    Result Review    Result Review:  I have personally reviewed the results from the time of this admission to 2/27/2025 16:26 EST and agree with these findings:  [x]  Laboratory  []  Microbiology  []  Radiology  []  EKG/Telemetry   []  Cardiology/Vascular   []   Pathology  []  Old records  []  Other:  Most notable findings include: Have been reviewed and discussed    Assessment & Plan   Assessment / Plan     Brief Patient Summary:  Marnie Parra is a 79 y.o. female who [place] chemotherapy-induced diarrhea and nausea vomiting    Active Hospital Problems:  Active Hospital Problems    Diagnosis     **Dehydration        Plan:   IV hydration    VTE Prophylaxis:  No VTE prophylaxis order currently exists.        CODE STATUS:         Admission Status:  I believe this patient meets Observation status.    Electronically signed by Nicolas Velasquez MD, 02/27/25, 4:26 PM EST.      Part of this note may be an electronic transcription/translation of spoken language to printed text using the Dragon Dictation System.

## 2025-02-27 NOTE — Clinical Note
Level of Care: Med/Surg [1]   Diagnosis: Dehydration [276.51.ICD-9-CM]   Admitting Physician: JACKIE COREA [[id number]]

## 2025-02-27 NOTE — ED NOTES
Spoke with Dr Velasquez about patient's request for pain medications, per Dr Velasquez he will place orders

## 2025-02-28 LAB
ALBUMIN SERPL-MCNC: 2.9 G/DL (ref 3.5–5.2)
ALBUMIN/GLOB SERPL: 1.3 G/DL
ALP SERPL-CCNC: 71 U/L (ref 39–117)
ALT SERPL W P-5'-P-CCNC: 5 U/L (ref 1–33)
ANION GAP SERPL CALCULATED.3IONS-SCNC: 9.5 MMOL/L (ref 5–15)
AST SERPL-CCNC: 10 U/L (ref 1–32)
BILIRUB SERPL-MCNC: 0.5 MG/DL (ref 0–1.2)
BUN SERPL-MCNC: 17 MG/DL (ref 8–23)
BUN/CREAT SERPL: 22.4 (ref 7–25)
CALCIUM SPEC-SCNC: 8.3 MG/DL (ref 8.6–10.5)
CHLORIDE SERPL-SCNC: 107 MMOL/L (ref 98–107)
CO2 SERPL-SCNC: 24.5 MMOL/L (ref 22–29)
CREAT SERPL-MCNC: 0.76 MG/DL (ref 0.57–1)
DEPRECATED RDW RBC AUTO: 44.7 FL (ref 37–54)
EGFRCR SERPLBLD CKD-EPI 2021: 80.3 ML/MIN/1.73
ERYTHROCYTE [DISTWIDTH] IN BLOOD BY AUTOMATED COUNT: 14.5 % (ref 12.3–15.4)
GLOBULIN UR ELPH-MCNC: 2.2 GM/DL
GLUCOSE SERPL-MCNC: 114 MG/DL (ref 65–99)
HCT VFR BLD AUTO: 30.3 % (ref 34–46.6)
HGB BLD-MCNC: 9.4 G/DL (ref 12–15.9)
INR PPP: 1.33 (ref 0.86–1.15)
MCH RBC QN AUTO: 26.8 PG (ref 26.6–33)
MCHC RBC AUTO-ENTMCNC: 31 G/DL (ref 31.5–35.7)
MCV RBC AUTO: 86.3 FL (ref 79–97)
PLATELET # BLD AUTO: 118 10*3/MM3 (ref 140–450)
PMV BLD AUTO: 10.5 FL (ref 6–12)
POTASSIUM SERPL-SCNC: 3.6 MMOL/L (ref 3.5–5.2)
PROT SERPL-MCNC: 5.1 G/DL (ref 6–8.5)
PROTHROMBIN TIME: 17.1 SECONDS (ref 11.8–14.9)
RBC # BLD AUTO: 3.51 10*6/MM3 (ref 3.77–5.28)
SODIUM SERPL-SCNC: 141 MMOL/L (ref 136–145)
WBC NRBC COR # BLD AUTO: 2.04 10*3/MM3 (ref 3.4–10.8)

## 2025-02-28 PROCEDURE — 25010000002 CEFTRIAXONE PER 250 MG: Performed by: INTERNAL MEDICINE

## 2025-02-28 PROCEDURE — 97161 PT EVAL LOW COMPLEX 20 MIN: CPT

## 2025-02-28 PROCEDURE — 80053 COMPREHEN METABOLIC PANEL: CPT | Performed by: INTERNAL MEDICINE

## 2025-02-28 PROCEDURE — 25810000003 LACTATED RINGERS PER 1000 ML: Performed by: INTERNAL MEDICINE

## 2025-02-28 PROCEDURE — 85610 PROTHROMBIN TIME: CPT | Performed by: INTERNAL MEDICINE

## 2025-02-28 PROCEDURE — 85027 COMPLETE CBC AUTOMATED: CPT | Performed by: INTERNAL MEDICINE

## 2025-02-28 RX ADMIN — TRAMADOL HYDROCHLORIDE 50 MG: 50 TABLET, COATED ORAL at 21:00

## 2025-02-28 RX ADMIN — SODIUM CHLORIDE 1000 MG: 9 INJECTION INTRAMUSCULAR; INTRAVENOUS; SUBCUTANEOUS at 10:22

## 2025-02-28 RX ADMIN — LOPERAMIDE HYDROCHLORIDE 4 MG: 2 CAPSULE ORAL at 15:58

## 2025-02-28 RX ADMIN — ACETAMINOPHEN 650 MG: 325 TABLET ORAL at 06:02

## 2025-02-28 RX ADMIN — SODIUM CHLORIDE, SODIUM LACTATE, POTASSIUM CHLORIDE, CALCIUM CHLORIDE 100 ML/HR: 20; 30; 600; 310 INJECTION, SOLUTION INTRAVENOUS at 15:00

## 2025-02-28 RX ADMIN — TRAMADOL HYDROCHLORIDE 50 MG: 50 TABLET, COATED ORAL at 10:39

## 2025-02-28 RX ADMIN — Medication 10 ML: at 10:23

## 2025-02-28 RX ADMIN — SODIUM CHLORIDE, SODIUM LACTATE, POTASSIUM CHLORIDE, CALCIUM CHLORIDE 100 ML/HR: 20; 30; 600; 310 INJECTION, SOLUTION INTRAVENOUS at 04:46

## 2025-02-28 NOTE — PROGRESS NOTES
[facility]     Progress Note    Patient Name: Marnie Parra  : 1945  MRN: [id number]  Primary Care Physician:  Nicolas Velasquez MD  Date of admission: 2025    Subjective   Subjective     Chief Complaint: Possible colitis history of rectal cancer status post chemotherapy still complaining of abdominal discomfort will continue IV hydration antibiotics patient otherwise stable CT scan findings with discussed possible hernia but not incarcerated has had extensive postoperative change    HPI: Patient with history of rectal cancer history of bladder cancer status post cystectomy and ilial pouch has been reviewed    Review of Systems   All systems were reviewed and negative except for: Has been reviewed    Objective   Objective     Vitals:   Temp:  [97.3 °F (36.3 °C)-98.5 °F (36.9 °C)] 97.9 °F (36.6 °C)  Heart Rate:  [] 82  Resp:  [18] 18  BP: (128-170)/(52-73) 136/64    Physical Exam    Constitutional: Alert and oriented not in acute distress   Eyes: Pupils equal reacting to light and accommodation   HENT: Normal    Neck: No lymphadenopathy   Respiratory: No rales or rhonchi no palpable organomegaly has ileal pouch   Cardiovascular: Normal S1-S2 normal no S3 or S4   Gastrointestinal: Ileal pouch no organomegaly no deformities   Musculoskeletal: No deformities   Neurologic: No localizing signs  Skin: No rash       Result Review    Result Review:  I have personally reviewed the results from the time of this admission to 2025 09:51 EST and agree with these findings:  [x]  Laboratory  []  Microbiology  [x]  Radiology  []  EKG/Telemetry   []  Cardiology/Vascular   []  Pathology  []  Old records  []  Other:  Most notable findings include: Have been reviewed and discussed    Assessment & Plan   Assessment / Plan     Brief Patient Summary:  Marnie Parra is a 79 y.o. female who patient with what appears to be gastroenteritis possible chemo induced colitis    Active Hospital Problems:  Active  Hospital Problems    Diagnosis     **Dehydration        Plan:   Continue IV hydration antibiotic    VTE Prophylaxis:  No VTE prophylaxis order currently exists.        CODE STATUS:   Level Of Support Discussed With: Patient  Code Status (Patient has no pulse and is not breathing): CPR (Attempt to Resuscitate)  Medical Interventions (Patient has pulse or is breathing): Full Support    Disposition:  I expect patient to be discharged after the patient has been stabilized.    Electronically signed by Nicolas Velasquez MD, 02/28/25, 9:51 AM EST.      Part of this note may be an electronic transcription/translation of spoken language to printed text using the Dragon Dictation System.

## 2025-02-28 NOTE — PLAN OF CARE
Goal Outcome Evaluation:  Plan of Care Reviewed With: patient           Outcome Evaluation: Patient A/Ox4, Pain managed with PRN pain meds, Transfers 1-2 assist with gait belt and walker. Patient had 5 diarrhea episodes and was given immodium. Has [place] pouch and self caths. continuous IVF running as ordered.Currently resting in bed with call light in reach.

## 2025-02-28 NOTE — PLAN OF CARE
Problem: Adult Inpatient Plan of Care  Goal: Plan of Care Review  Outcome: Progressing  Flowsheets (Taken 2/28/2025 0337)  Outcome Evaluation: Pt A&O, pain controlled, MD made aware of pt VS no new order noted, Pt has [place] Pouch that pt is managing without difficulty and doing intermittent catheterization her self, stoma site is red, moist, irritated and have scant bleeding after pt did self catheterization, will continue to monitor stoma, call light in reach  Plan of Care Reviewed With: patient  Goal: Patient-Specific Goal (Individualized)  Outcome: Progressing  Goal: Absence of Hospital-Acquired Illness or Injury  Outcome: Progressing  Intervention: Identify and Manage Fall Risk  Recent Flowsheet Documentation  Taken 2/28/2025 0300 by Christel Queen LPN  Safety Promotion/Fall Prevention: safety round/check completed  Taken 2/27/2025 2100 by Christel Queen LPN  Safety Promotion/Fall Prevention: safety round/check completed  Taken 2/27/2025 1912 by Christel Queen LPN  Safety Promotion/Fall Prevention: safety round/check completed  Intervention: Prevent Skin Injury  Recent Flowsheet Documentation  Taken 2/27/2025 2100 by Christel Queen LPN  Body Position: position changed independently  Taken 2/27/2025 1912 by Christel Queen LPN  Body Position: position changed independently  Goal: Optimal Comfort and Wellbeing  Outcome: Progressing  Goal: Readiness for Transition of Care  Outcome: Progressing     Problem: Fall Injury Risk  Goal: Absence of Fall and Fall-Related Injury  Outcome: Progressing  Intervention: Promote Injury-Free Environment  Recent Flowsheet Documentation  Taken 2/28/2025 0300 by Christel Queen LPN  Safety Promotion/Fall Prevention: safety round/check completed  Taken 2/27/2025 2100 by Christel Queen LPN  Safety Promotion/Fall Prevention: safety round/check completed  Taken 2/27/2025 1912 by Christel Queen LPN  Safety Promotion/Fall Prevention: safety round/check completed      Problem: Skin Injury Risk Increased  Goal: Skin Health and Integrity  Outcome: Progressing   Goal Outcome Evaluation:  Plan of Care Reviewed With: patient           Outcome Evaluation: Pt A&O, pain controlled, MD made aware of pt VS and pt positive for simple sepsis no new order noted, Pt has [place] Pouch that pt is managing without difficulty and doing intermittent catheterization her self, stoma site is red, moist, irritated and have scant bleeding after pt did self catheterization, will continue to monitor stoma, call light in reach

## 2025-02-28 NOTE — THERAPY EVALUATION
Acute Care - Physical Therapy Initial Evaluation   [facility]     Patient Name: Marnie Parra  : 1945  MRN: [id number]  Today's Date: 2025      Visit Dx:     ICD-10-CM ICD-9-CM   1. Dehydration  E86.0 276.51   2. Difficulty in walking  R26.2 719.7     Patient Active Problem List   Diagnosis    Primary osteoarthritis of left knee    Sepsis    Cellulitis    Ureterolithiasis    Kidney stones, calcium oxalate dihydrate    Hypotension    Rectal mass    Rectal bleed    Rectal cancer    Dysphagia    History of rectal cancer    Dehydration     Past Medical History:   Diagnosis Date    Arthritis     Cancer     bladder    GERD (gastroesophageal reflux disease)     Hyperlipidemia     Hypertension     Kidney stone     PONV (postoperative nausea and vomiting)     Stroke     2016 left side     Past Surgical History:   Procedure Laterality Date    ABDOMINAL SURGERY      APPENDECTOMY      COLONOSCOPY      CYSTECTOMY      bladder removal, with pouch made from stomach    ENDOSCOPY N/A 10/2/2024    Procedure: ESOPHAGOGASTRODUODENOSCOPY WITH BIOPSIES AND 15-18MM BALLOON DILATATION;  Surgeon: Gilberto Wolf MD;  Location: [facility] ENDOSCOPY;  Service: Gastroenterology;  Laterality: N/A;  HIATAL HERNIA, SCHATZKI'S RING, FUNGAL ESOPHAGITIS    HERNIA REPAIR      HYSTERECTOMY      KIDNEY STONE SURGERY      SIGMOIDOSCOPY N/A 7/3/2024    Procedure: FLEXIBLE SIGMOIDOSCOPY TO TRANSVERSE COLON WITH BIOPSIES;  Surgeon: Darius Andujar MD;  Location: [facility] ENDOSCOPY;  Service: Gastroenterology;  Laterality: N/A;  RECTAL MASS, DIVERTICULOSIS    TUBAL ABDOMINAL LIGATION      VENOUS ACCESS DEVICE (PORT) INSERTION Right 2024    Procedure: INSERTION VENOUS ACCESS DEVICE;  Surgeon: Luis Savage MD;  Location: [facility] MAIN OR;  Service: General;  Laterality: Right;     PT Assessment (Last 12 Hours)       PT Evaluation and Treatment       Row Name 25 1300          Physical Therapy Time and Intention    Subjective  Information no complaints  -LISA     Document Type evaluation  -LISA     Mode of Treatment individual therapy;physical therapy  -LISA     Patient Effort good  -LISA       Row Name 02/28/25 1300          General Information    Patient Observations alert;cooperative;agree to therapy  -LISA     Prior Level of Function independent:;all household mobility;community mobility  -LISA     Equipment Currently Used at Home cane, quad;walker, rolling  -LISA     Existing Precautions/Restrictions fall  -LISA     Barriers to Rehab none identified  -LISA       Row Name 02/28/25 1300          Living Environment    Current Living Arrangements home  -LISA       Row Name 02/28/25 1300          Range of Motion (ROM)    Range of Motion ROM is L  -LISA       Row Name 02/28/25 1300          Strength (Manual Muscle Testing)    Strength (Manual Muscle Testing) strength is [facility] Name 02/28/25 1300          Bed Mobility    Bed Mobility bed mobility (all) activities;supine-sit  -LISA     All Activities, [place] (Bed Mobility) standby assist  -LISA     Supine-Sit [place] (Bed Mobility) standby assist  -LISA       Row Name 02/28/25 1300          Transfers    Transfers bed-chair transfer;sit-stand transfer  -LISA       Row Name 02/28/25 1300          Bed-Chair Transfer    Bed-Chair [place] (Transfers) contact guard  -LISA     Assistive Device (Bed-Chair Transfers) walker, front-wheeled  -LISA       Row Name 02/28/25 1300          Sit-Stand Transfer    Sit-Stand [place] (Transfers) contact guard  -LISA     Assistive Device (Sit-Stand Transfers) walker, front-wheeled  -LISA       Row Name 02/28/25 1300          Gait/Stairs (Locomotion)    Gait/Stairs Locomotion gait/ambulation assistive device  -LISA     [place] Level (Gait) contact guard  -LISA     Assistive Device (Gait) walker, front-wheeled  -LISA     Distance in Feet (Gait) 30  fatigues quickly  -LISA       Row Name 02/28/25 1300          Safety Issues/Impairments Affecting Functional Mobility     Impairments Affecting Function (Mobility) balance;strength  -LISA       Row Name 02/28/25 1300          Balance    Balance Assessment standing dynamic balance  -LISA     Dynamic Standing Balance contact guard  -LISA     Position/Device Used, Standing Balance walker, front-wheeled  -LISA       Row Name 02/28/25 1300          Plan of Care Review    Plan of Care Reviewed With patient  -LISA     Outcome Evaluation Patient presents with decreased strength, transfers and ambulation.  Skilled physical therapy services will be required to address these mobility deficits.  -LISA       Row Name 02/28/25 1300          Therapy Assessment/Plan (PT)    Rehab Potential (PT) good  -LISA     Criteria for Skilled Interventions Met (PT) skilled treatment is necessary  -LISA     Therapy Frequency (PT) daily  -LISA     Predicted Duration of Therapy Intervention (PT) 10 days  -LISA     Problem List (PT) problems related to;balance;mobility;strength;pain  -LISA     Activity Limitations Related to Problem List (PT) unable to ambulate safely;unable to transfer safely  -LISA       Row Name 02/28/25 1300          PT Evaluation Complexity    History, PT Evaluation Complexity no personal factors and/or comorbidities  -LISA     Examination of Body Systems (PT Eval Complexity) total of 4 or more elements  -LISA     Clinical Presentation (PT Evaluation Complexity) stable  -LISA     Clinical Decision Making (PT Evaluation Complexity) low complexity  -LISA     Overall Complexity (PT Evaluation Complexity) low complexity  -LISA       Row Name 02/28/25 1300          Therapy Plan Review/Discharge Plan (PT)    Therapy Plan Review (PT) evaluation/treatment results reviewed;participants voiced agreement with care plan;participants included;patient  -LISA       Row Name 02/28/25 1300          Physical Therapy Goals    Transfer Goal Selection (PT) transfer, PT goal 1  -LISA     Gait Training Goal Selection (PT) gait training, PT goal 1  -LISA       Row Name 02/28/25 1300          Transfer Goal 1  (PT)    Activity/Assistive Device (Transfer Goal 1, PT) transfers, all  -LISA     Coffee Level/Cues Needed (Transfer Goal 1, PT) independent  -LISA     Time Frame (Transfer Goal 1, PT) long term goal (LTG);10 days  -LISA       Row Name 02/28/25 1300          Gait Training Goal 1 (PT)    Activity/Assistive Device (Gait Training Goal 1, PT) gait (walking locomotion);assistive device use;walker, rolling  -LISA     Coffee Level (Gait Training Goal 1, PT) independent  -LISA     Distance (Gait Training Goal 1, PT) 300  -LISA     Time Frame (Gait Training Goal 1, PT) long term goal (LTG);10 days  -LISA               User Key  (r) = Recorded By, (t) = Taken By, (c) = Cosigned By      Initials Name Provider Type    Corwin Cuadra, PT Physical Therapist                    Physical Therapy Education        No education to display                  PT Recommendation and Plan  Anticipated Discharge Disposition (PT): home with home health  Planned Therapy Interventions (PT): balance training, bed mobility training, gait training, home exercise program, strengthening, stair training, transfer training  Therapy Frequency (PT): daily  Plan of Care Reviewed With: patient  Outcome Evaluation: Patient presents with decreased strength, transfers and ambulation.  Skilled physical therapy services will be required to address these mobility deficits.   Outcome Measures       Row Name 02/28/25 1300             How much help from another person do you currently need...    Turning from your back to your side while in flat bed without using bedrails? 4  -LISA      Moving from lying on back to sitting on the side of a flat bed without bedrails? 4  -LISA      Moving to and from a bed to a chair (including a wheelchair)? 3  -LISA      Standing up from a chair using your arms (e.g., wheelchair, bedside chair)? 3  -LISA      Climbing 3-5 steps with a railing? 3  -LISA      To walk in hospital room? 3  -LISA      AM-PAC 6 Clicks Score (PT) 20  -LISA          Functional Assessment    Outcome Measure Options AM-PAC 6 Clicks Basic Mobility (PT)  -LISA                User Key  (r) = Recorded By, (t) = Taken By, (c) = Cosigned By      Initials Name Provider Type    Corwin Cuadra, PT Physical Therapist                     Time Calculation:    PT Charges       Row Name 02/28/25 1330             Time Calculation    PT Received On 02/28/25  -LISA      PT Goal Re-Cert Due Date 03/09/25  -LISA         Untimed Charges    PT Eval/Re-eval Minutes 30  -LISA         Total Minutes    Untimed Charges Total Minutes 30  -LISA       Total Minutes 30  -LISA                User Key  (r) = Recorded By, (t) = Taken By, (c) = Cosigned By      Initials Name Provider Type    Corwin Cuadra, PT Physical Therapist                      PT G-Codes  Outcome Measure Options: AM-PAC 6 Clicks Basic Mobility (PT)  AM-PAC 6 Clicks Score (PT): 20    Corwin France, PT  2/28/2025

## 2025-03-01 LAB
027 TOXIN: ABNORMAL
ANION GAP SERPL CALCULATED.3IONS-SCNC: 10.1 MMOL/L (ref 5–15)
ANISOCYTOSIS BLD QL: ABNORMAL
BUN SERPL-MCNC: 13 MG/DL (ref 8–23)
BUN/CREAT SERPL: 22.4 (ref 7–25)
C DIFF GDH + TOXINS A+B STL QL IA.RAPID: NEGATIVE
C DIFF TOX GENS STL QL NAA+PROBE: POSITIVE
CALCIUM SPEC-SCNC: 8.2 MG/DL (ref 8.6–10.5)
CHLORIDE SERPL-SCNC: 103 MMOL/L (ref 98–107)
CO2 SERPL-SCNC: 24.9 MMOL/L (ref 22–29)
CREAT SERPL-MCNC: 0.58 MG/DL (ref 0.57–1)
DEPRECATED RDW RBC AUTO: 43.9 FL (ref 37–54)
EGFRCR SERPLBLD CKD-EPI 2021: 92.8 ML/MIN/1.73
EOSINOPHIL # BLD MANUAL: 0.05 10*3/MM3 (ref 0–0.4)
EOSINOPHIL NFR BLD MANUAL: 2 % (ref 0.3–6.2)
ERYTHROCYTE [DISTWIDTH] IN BLOOD BY AUTOMATED COUNT: 14.3 % (ref 12.3–15.4)
GLUCOSE SERPL-MCNC: 105 MG/DL (ref 65–99)
HCT VFR BLD AUTO: 27.2 % (ref 34–46.6)
HGB BLD-MCNC: 8.7 G/DL (ref 12–15.9)
LYMPHOCYTES # BLD MANUAL: 0.59 10*3/MM3 (ref 0.7–3.1)
LYMPHOCYTES NFR BLD MANUAL: 12 % (ref 5–12)
MAGNESIUM SERPL-MCNC: 1.5 MG/DL (ref 1.6–2.4)
MCH RBC QN AUTO: 27 PG (ref 26.6–33)
MCHC RBC AUTO-ENTMCNC: 32 G/DL (ref 31.5–35.7)
MCV RBC AUTO: 84.5 FL (ref 79–97)
MONOCYTES # BLD: 0.27 10*3/MM3 (ref 0.1–0.9)
NEUTROPHILS # BLD AUTO: 1.35 10*3/MM3 (ref 1.7–7)
NEUTROPHILS NFR BLD MANUAL: 50 % (ref 42.7–76)
NEUTS BAND NFR BLD MANUAL: 10 % (ref 0–5)
PLATELET # BLD AUTO: 116 10*3/MM3 (ref 140–450)
PMV BLD AUTO: 9.4 FL (ref 6–12)
POTASSIUM SERPL-SCNC: 3 MMOL/L (ref 3.5–5.2)
RBC # BLD AUTO: 3.22 10*6/MM3 (ref 3.77–5.28)
SCAN SLIDE: NORMAL
SMALL PLATELETS BLD QL SMEAR: ABNORMAL
SODIUM SERPL-SCNC: 138 MMOL/L (ref 136–145)
VARIANT LYMPHS NFR BLD MANUAL: 26 % (ref 19.6–45.3)
WBC MORPH BLD: NORMAL
WBC NRBC COR # BLD AUTO: 2.25 10*3/MM3 (ref 3.4–10.8)

## 2025-03-01 PROCEDURE — 85025 COMPLETE CBC W/AUTO DIFF WBC: CPT | Performed by: INTERNAL MEDICINE

## 2025-03-01 PROCEDURE — 85007 BL SMEAR W/DIFF WBC COUNT: CPT | Performed by: INTERNAL MEDICINE

## 2025-03-01 PROCEDURE — 87449 NOS EACH ORGANISM AG IA: CPT | Performed by: INTERNAL MEDICINE

## 2025-03-01 PROCEDURE — 83735 ASSAY OF MAGNESIUM: CPT | Performed by: INTERNAL MEDICINE

## 2025-03-01 PROCEDURE — 25010000002 CEFTRIAXONE PER 250 MG: Performed by: INTERNAL MEDICINE

## 2025-03-01 PROCEDURE — 87493 C DIFF AMPLIFIED PROBE: CPT | Performed by: INTERNAL MEDICINE

## 2025-03-01 PROCEDURE — 25810000003 LACTATED RINGERS PER 1000 ML: Performed by: INTERNAL MEDICINE

## 2025-03-01 PROCEDURE — 80048 BASIC METABOLIC PNL TOTAL CA: CPT | Performed by: INTERNAL MEDICINE

## 2025-03-01 RX ORDER — DICYCLOMINE HCL 20 MG
20 TABLET ORAL 4 TIMES DAILY
Status: DISCONTINUED | OUTPATIENT
Start: 2025-03-01 | End: 2025-03-01

## 2025-03-01 RX ORDER — ONDANSETRON 4 MG/1
8 TABLET, ORALLY DISINTEGRATING ORAL EVERY 8 HOURS PRN
Status: DISCONTINUED | OUTPATIENT
Start: 2025-03-01 | End: 2025-03-10 | Stop reason: HOSPADM

## 2025-03-01 RX ORDER — PANTOPRAZOLE SODIUM 40 MG/1
40 TABLET, DELAYED RELEASE ORAL DAILY
Status: DISCONTINUED | OUTPATIENT
Start: 2025-03-01 | End: 2025-03-10 | Stop reason: HOSPADM

## 2025-03-01 RX ORDER — DIPHENOXYLATE HYDROCHLORIDE AND ATROPINE SULFATE 2.5; .025 MG/1; MG/1
1 TABLET ORAL 4 TIMES DAILY
Status: DISCONTINUED | OUTPATIENT
Start: 2025-03-01 | End: 2025-03-01

## 2025-03-01 RX ORDER — TRAMADOL HYDROCHLORIDE 50 MG/1
50 TABLET ORAL EVERY 6 HOURS PRN
Status: DISCONTINUED | OUTPATIENT
Start: 2025-03-01 | End: 2025-03-10 | Stop reason: HOSPADM

## 2025-03-01 RX ORDER — POTASSIUM CHLORIDE 750 MG/1
40 CAPSULE, EXTENDED RELEASE ORAL 2 TIMES DAILY WITH MEALS
Status: COMPLETED | OUTPATIENT
Start: 2025-03-01 | End: 2025-03-01

## 2025-03-01 RX ORDER — VANCOMYCIN HYDROCHLORIDE 125 MG/1
125 CAPSULE ORAL EVERY 6 HOURS SCHEDULED
Status: DISCONTINUED | OUTPATIENT
Start: 2025-03-01 | End: 2025-03-10 | Stop reason: HOSPADM

## 2025-03-01 RX ADMIN — LOPERAMIDE HYDROCHLORIDE 4 MG: 2 CAPSULE ORAL at 01:18

## 2025-03-01 RX ADMIN — VANCOMYCIN HYDROCHLORIDE 125 MG: 125 CAPSULE ORAL at 17:23

## 2025-03-01 RX ADMIN — SODIUM CHLORIDE, SODIUM LACTATE, POTASSIUM CHLORIDE, CALCIUM CHLORIDE 100 ML/HR: 20; 30; 600; 310 INJECTION, SOLUTION INTRAVENOUS at 22:10

## 2025-03-01 RX ADMIN — DIPHENOXYLATE HYDROCHLORIDE AND ATROPINE SULFATE 1 TABLET: 2.5; .025 TABLET ORAL at 11:51

## 2025-03-01 RX ADMIN — Medication 10 ML: at 21:18

## 2025-03-01 RX ADMIN — POTASSIUM CHLORIDE 40 MEQ: 750 CAPSULE, EXTENDED RELEASE ORAL at 11:51

## 2025-03-01 RX ADMIN — POTASSIUM CHLORIDE 20 MEQ: 750 CAPSULE, EXTENDED RELEASE ORAL at 17:23

## 2025-03-01 RX ADMIN — LOPERAMIDE HYDROCHLORIDE 4 MG: 2 CAPSULE ORAL at 09:21

## 2025-03-01 RX ADMIN — SODIUM CHLORIDE 1000 MG: 9 INJECTION INTRAMUSCULAR; INTRAVENOUS; SUBCUTANEOUS at 09:22

## 2025-03-01 RX ADMIN — Medication 10 ML: at 09:22

## 2025-03-01 RX ADMIN — PANTOPRAZOLE SODIUM 40 MG: 40 TABLET, DELAYED RELEASE ORAL at 11:51

## 2025-03-01 RX ADMIN — TRAMADOL HYDROCHLORIDE 50 MG: 50 TABLET, COATED ORAL at 12:30

## 2025-03-01 RX ADMIN — SODIUM CHLORIDE, SODIUM LACTATE, POTASSIUM CHLORIDE, CALCIUM CHLORIDE 100 ML/HR: 20; 30; 600; 310 INJECTION, SOLUTION INTRAVENOUS at 11:52

## 2025-03-01 RX ADMIN — DICYCLOMINE HYDROCHLORIDE 20 MG: 20 TABLET ORAL at 11:51

## 2025-03-01 NOTE — PLAN OF CARE
Goal Outcome Evaluation:  Plan of Care Reviewed With: patient        Progress: improving  Outcome Evaluation: Pt complained of pain once during the shift, which was managed with oral PRN Ultram. VSS. PT is able to self cath her [place] pouch. Pt is expecting to use home health upon discharge.

## 2025-03-01 NOTE — PROGRESS NOTES
[facility]     Progress Note    Patient Name: Marnie Parra  : 1945  MRN: [id number]  Primary Care Physician:  Nicolas Velasquez MD  Date of admission: 2025    Subjective   Subjective     Chief Complaint: Patient continues to complaining of abdominal cramps and diarrhea he does have an [place] pouch and has discomfort we will start her on Bentyl she already is getting Lomotil and Imodium for diarrhea she had been treated with capecitabine which may be the reason for her cramping pain and diarrhea    HPI: Continues to have abdominal pain and diarrhea will get C. difficile    Review of Systems   All systems were reviewed and negative except for: Has been reviewed    Objective   Objective     Vitals:   Temp:  [97.9 °F (36.6 °C)-98.7 °F (37.1 °C)] 98.1 °F (36.7 °C)  Heart Rate:  [79-96] 82  Resp:  [18] 18  BP: (127-156)/(57-73) 136/68    Physical Exam    Constitutional: Alert and oriented not in acute distress   Eyes: Pupils equal reacting to light and accommodation   HENT: Normal   Neck: Normal   Respiratory: No rales or rhonchi   Cardiovascular: S1-S2 normal no S3 or S4   Gastrointestinal: No palpable organomegaly has [place] pouch   Musculoskeletal: No deformities   Neurologic: No localizing signs  Skin: No rash       Result Review    Result Review:  I have personally reviewed the results from the time of this admission to 3/1/2025 11:00 EST and agree with these findings:  [x]  Laboratory  []  Microbiology  [x]  Radiology  []  EKG/Telemetry   []  Cardiology/Vascular   []  Pathology  []  Old records  []  Other:  Most notable findings include: Have been reviewed and discussed    Assessment & Plan   Assessment / Plan     Brief Patient Summary:  Marnie Parra is a 79 y.o. female who patient with diarrhea abdominal cramps    Active Hospital Problems:  Active Hospital Problems    Diagnosis     **Dehydration        Plan:   Continue antidiarrhea medication and Bentyl    VTE Prophylaxis:  No VTE  prophylaxis order currently exists.        CODE STATUS:   Level Of Support Discussed With: Patient  Code Status (Patient has no pulse and is not breathing): CPR (Attempt to Resuscitate)  Medical Interventions (Patient has pulse or is breathing): Full Support    Disposition:  I expect patient to be discharged after patient has been stabilized.    Electronically signed by Nicolas Velasquez MD, 03/01/25, 11:00 AM EST.      Part of this note may be an electronic transcription/translation of spoken language to printed text using the Dragon Dictation System.

## 2025-03-02 LAB
ANION GAP SERPL CALCULATED.3IONS-SCNC: 8 MMOL/L (ref 5–15)
BUN SERPL-MCNC: 10 MG/DL (ref 8–23)
BUN/CREAT SERPL: 16.9 (ref 7–25)
CALCIUM SPEC-SCNC: 7.9 MG/DL (ref 8.6–10.5)
CHLORIDE SERPL-SCNC: 104 MMOL/L (ref 98–107)
CO2 SERPL-SCNC: 25 MMOL/L (ref 22–29)
CREAT SERPL-MCNC: 0.59 MG/DL (ref 0.57–1)
DEPRECATED RDW RBC AUTO: 44.3 FL (ref 37–54)
EGFRCR SERPLBLD CKD-EPI 2021: 92.4 ML/MIN/1.73
ERYTHROCYTE [DISTWIDTH] IN BLOOD BY AUTOMATED COUNT: 14.5 % (ref 12.3–15.4)
GLUCOSE SERPL-MCNC: 87 MG/DL (ref 65–99)
HCT VFR BLD AUTO: 28 % (ref 34–46.6)
HGB BLD-MCNC: 8.9 G/DL (ref 12–15.9)
LYMPHOCYTES # BLD MANUAL: 0.35 10*3/MM3 (ref 0.7–3.1)
LYMPHOCYTES NFR BLD MANUAL: 8 % (ref 5–12)
MCH RBC QN AUTO: 27.1 PG (ref 26.6–33)
MCHC RBC AUTO-ENTMCNC: 31.8 G/DL (ref 31.5–35.7)
MCV RBC AUTO: 85.1 FL (ref 79–97)
MONOCYTES # BLD: 0.12 10*3/MM3 (ref 0.1–0.9)
NEUTROPHILS # BLD AUTO: 1 10*3/MM3 (ref 1.7–7)
NEUTROPHILS NFR BLD MANUAL: 56 % (ref 42.7–76)
NEUTS BAND NFR BLD MANUAL: 12 % (ref 0–5)
PLATELET # BLD AUTO: 138 10*3/MM3 (ref 140–450)
PMV BLD AUTO: 9.6 FL (ref 6–12)
POTASSIUM SERPL-SCNC: 3.3 MMOL/L (ref 3.5–5.2)
POTASSIUM SERPL-SCNC: 4.4 MMOL/L (ref 3.5–5.2)
RBC # BLD AUTO: 3.29 10*6/MM3 (ref 3.77–5.28)
RBC MORPH BLD: NORMAL
SCAN SLIDE: NORMAL
SMALL PLATELETS BLD QL SMEAR: ABNORMAL
SODIUM SERPL-SCNC: 137 MMOL/L (ref 136–145)
VARIANT LYMPHS NFR BLD MANUAL: 24 % (ref 19.6–45.3)
WBC MORPH BLD: NORMAL
WBC NRBC COR # BLD AUTO: 1.47 10*3/MM3 (ref 3.4–10.8)

## 2025-03-02 PROCEDURE — 97116 GAIT TRAINING THERAPY: CPT

## 2025-03-02 PROCEDURE — 85007 BL SMEAR W/DIFF WBC COUNT: CPT | Performed by: INTERNAL MEDICINE

## 2025-03-02 PROCEDURE — 25010000002 CEFTRIAXONE PER 250 MG: Performed by: INTERNAL MEDICINE

## 2025-03-02 PROCEDURE — 84132 ASSAY OF SERUM POTASSIUM: CPT | Performed by: INTERNAL MEDICINE

## 2025-03-02 PROCEDURE — 85025 COMPLETE CBC W/AUTO DIFF WBC: CPT | Performed by: INTERNAL MEDICINE

## 2025-03-02 PROCEDURE — 25010000002 ONDANSETRON PER 1 MG: Performed by: INTERNAL MEDICINE

## 2025-03-02 PROCEDURE — 25810000003 LACTATED RINGERS PER 1000 ML: Performed by: INTERNAL MEDICINE

## 2025-03-02 PROCEDURE — 92610 EVALUATE SWALLOWING FUNCTION: CPT

## 2025-03-02 PROCEDURE — 80048 BASIC METABOLIC PNL TOTAL CA: CPT | Performed by: INTERNAL MEDICINE

## 2025-03-02 PROCEDURE — 97110 THERAPEUTIC EXERCISES: CPT

## 2025-03-02 RX ORDER — SIMETHICONE 80 MG
80 TABLET,CHEWABLE ORAL 4 TIMES DAILY PRN
Status: DISCONTINUED | OUTPATIENT
Start: 2025-03-02 | End: 2025-03-10 | Stop reason: HOSPADM

## 2025-03-02 RX ORDER — POTASSIUM CHLORIDE 1.5 G/1.58G
40 POWDER, FOR SOLUTION ORAL EVERY 4 HOURS
Status: COMPLETED | OUTPATIENT
Start: 2025-03-02 | End: 2025-03-02

## 2025-03-02 RX ADMIN — SIMETHICONE 80 MG: 80 TABLET, CHEWABLE ORAL at 16:23

## 2025-03-02 RX ADMIN — ONDANSETRON 4 MG: 2 INJECTION INTRAMUSCULAR; INTRAVENOUS at 14:29

## 2025-03-02 RX ADMIN — Medication 10 ML: at 08:38

## 2025-03-02 RX ADMIN — PANTOPRAZOLE SODIUM 40 MG: 40 TABLET, DELAYED RELEASE ORAL at 08:35

## 2025-03-02 RX ADMIN — VANCOMYCIN HYDROCHLORIDE 125 MG: 125 CAPSULE ORAL at 11:56

## 2025-03-02 RX ADMIN — POTASSIUM CHLORIDE 40 MEQ: 1.5 POWDER, FOR SOLUTION ORAL at 11:53

## 2025-03-02 RX ADMIN — ACETAMINOPHEN 650 MG: 325 TABLET ORAL at 05:29

## 2025-03-02 RX ADMIN — VANCOMYCIN HYDROCHLORIDE 125 MG: 125 CAPSULE ORAL at 05:20

## 2025-03-02 RX ADMIN — ALUMINUM HYDROXIDE, MAGNESIUM HYDROXIDE, AND DIMETHICONE 15 ML: 400; 400; 40 SUSPENSION ORAL at 12:55

## 2025-03-02 RX ADMIN — TRAMADOL HYDROCHLORIDE 50 MG: 50 TABLET, COATED ORAL at 01:04

## 2025-03-02 RX ADMIN — SODIUM CHLORIDE 1000 MG: 9 INJECTION INTRAMUSCULAR; INTRAVENOUS; SUBCUTANEOUS at 08:36

## 2025-03-02 RX ADMIN — SODIUM CHLORIDE, SODIUM LACTATE, POTASSIUM CHLORIDE, CALCIUM CHLORIDE 100 ML/HR: 20; 30; 600; 310 INJECTION, SOLUTION INTRAVENOUS at 18:07

## 2025-03-02 RX ADMIN — POTASSIUM CHLORIDE 40 MEQ: 1.5 POWDER, FOR SOLUTION ORAL at 05:20

## 2025-03-02 RX ADMIN — SODIUM CHLORIDE, SODIUM LACTATE, POTASSIUM CHLORIDE, CALCIUM CHLORIDE 100 ML/HR: 20; 30; 600; 310 INJECTION, SOLUTION INTRAVENOUS at 08:03

## 2025-03-02 RX ADMIN — VANCOMYCIN HYDROCHLORIDE 125 MG: 125 CAPSULE ORAL at 00:59

## 2025-03-02 RX ADMIN — VANCOMYCIN HYDROCHLORIDE 125 MG: 125 CAPSULE ORAL at 17:35

## 2025-03-02 NOTE — THERAPY TREATMENT NOTE
Acute Care - Physical Therapy Treatment Note   Delia     Patient Name: Marnie Parra  : 1945  MRN: [id number]  Today's Date: 3/2/2025      Visit Dx:     ICD-10-CM ICD-9-CM   1. Dehydration  E86.0 276.51   2. Difficulty in walking  R26.2 719.7   3. Dysphagia, unspecified type  R13.10 787.20     Patient Active Problem List   Diagnosis    Primary osteoarthritis of left knee    Sepsis    Cellulitis    Ureterolithiasis    Kidney stones, calcium oxalate dihydrate    Hypotension    Rectal mass    Rectal bleed    Rectal cancer    Dysphagia    History of rectal cancer    Dehydration     Past Medical History:   Diagnosis Date    Arthritis     Cancer     bladder    GERD (gastroesophageal reflux disease)     Hyperlipidemia     Hypertension     Kidney stone     PONV (postoperative nausea and vomiting)     Stroke     2016 left side     Past Surgical History:   Procedure Laterality Date    ABDOMINAL SURGERY      APPENDECTOMY      COLONOSCOPY      CYSTECTOMY      bladder removal, with pouch made from stomach    ENDOSCOPY N/A 10/2/2024    Procedure: ESOPHAGOGASTRODUODENOSCOPY WITH BIOPSIES AND 15-18MM BALLOON DILATATION;  Surgeon: Gilberto Wolf MD;  Location: [facility] ENDOSCOPY;  Service: Gastroenterology;  Laterality: N/A;  HIATAL HERNIA, SCHATZKI'S RING, FUNGAL ESOPHAGITIS    HERNIA REPAIR      HYSTERECTOMY      KIDNEY STONE SURGERY      SIGMOIDOSCOPY N/A 7/3/2024    Procedure: FLEXIBLE SIGMOIDOSCOPY TO TRANSVERSE COLON WITH BIOPSIES;  Surgeon: Darius Andujar MD;  Location: [facility] ENDOSCOPY;  Service: Gastroenterology;  Laterality: N/A;  RECTAL MASS, DIVERTICULOSIS    TUBAL ABDOMINAL LIGATION      VENOUS ACCESS DEVICE (PORT) INSERTION Right 2024    Procedure: INSERTION VENOUS ACCESS DEVICE;  Surgeon: Luis Savage MD;  Location: [facility] MAIN OR;  Service: General;  Laterality: Right;     PT Assessment (Last 12 Hours)       PT Evaluation and Treatment       Row Name 25 1224          Physical  Therapy Time and Intention    Subjective Information complains of;weakness;fatigue;pain  -DK     Document Type therapy note (daily note)  -DK     Mode of Treatment individual therapy;physical therapy  -DK     Patient Effort good  -DK     Symptoms Noted During/After Treatment fatigue;increased pain  -DK       Row Name 03/02/25 1224          Pain    Pretreatment Pain Rating 7/10  -DK     Posttreatment Pain Rating 7/10  -DK     Pain Side/Orientation generalized  -DK     Pain Management Interventions exercise or physical activity utilized  -DK     Response to Pain Interventions activity and movement patterns unchanged  -DK       Row Name 03/02/25 1224          Cognition    Affect/Mental Status (Cognition) WNL  -DK     Orientation Status (Cognition) oriented x 4  -DK     Follows Commands (Cognition) WNL  -DK     Cognitive Function (Cognition) WNL  -DK     Personal Safety Interventions gait belt;nonskid shoes/slippers when out of bed;supervised activity  -       Row Name 03/02/25 1224          Bed Mobility    Bed Mobility supine-sit-supine  -DK     All Activities, [place] (Bed Mobility) standby assist  -DK     Supine-Sit [place] (Bed Mobility) standby assist  -DK     Supine-Sit-Supine [place] (Bed Mobility) standby assist  -DK     Assistive Device (Bed Mobility) bed rails  -       Row Name 03/02/25 1224          Transfers    Transfers sit-stand transfer;stand-sit transfer  -       Row Name 03/02/25 1224          Sit-Stand Transfer    Sit-Stand [place] (Transfers) standby assist;contact guard;1 person assist  -     Assistive Device (Sit-Stand Transfers) walker, front-wheeled  -       Row Name 03/02/25 1224          Stand-Sit Transfer    Stand-Sit [place] (Transfers) standby assist;contact guard;1 person assist  -     Assistive Device (Stand-Sit Transfers) walker, front-wheeled  -       Row Name 03/02/25 1224          Gait/Stairs (Locomotion)    Gait/Stairs Locomotion gait/ambulation  independence;gait/ambulation assistive device;distance ambulated;gait pattern  -DK     [place] (Gait) standby assist;contact guard;1 person assist  -DK     Assistive Device (Gait) walker, front-wheeled  -DK     Distance in Feet (Gait) 50  -DK     Pattern (Gait) step-to  -DK     Deviations/Abnormal Patterns (Gait) alfonzo decreased;festinating/shuffling;gait speed decreased;stride length decreased  -     Comment, (Gait/Stairs) Pt ambulated on room air with a rolling walker. Pt was mildly unsteady with gait. Cues given to stay inside the confines of the rolling walker, especially during turns.  She returned to bed post treatment.  -       Row Name 03/02/25 1224          Safety Issues/Impairments Affecting Functional Mobility    Safety Issues Affecting Function (Mobility) safety precaution awareness  -     Impairments Affecting Function (Mobility) balance;endurance/activity tolerance;pain;strength  -       Row Name 03/02/25 1224          Balance    Balance Assessment sitting static balance;sitting dynamic balance;standing static balance;standing dynamic balance  -DK     Static Sitting Balance standby assist  -     Dynamic Sitting Balance standby assist  -DK     Position, Sitting Balance unsupported;sitting edge of bed  -DK     Static Standing Balance standby assist;contact guard;1-person assist  -DK     Dynamic Standing Balance standby assist;contact guard;1-person assist  -DK     Position/Device Used, Standing Balance walker, front-wheeled  -DK     Balance Interventions standing;dynamic;tandem gait  -       Row Name 03/02/25 1224          Motor Skills    Motor Skills --  therapeutic exercises  -     Therapeutic Exercise hip;knee;ankle  -       Row Name 03/02/25 1224          Hip (Therapeutic Exercise)    Hip (Therapeutic Exercise) AAROM (active assistive range of motion)  -     Hip AAROM (Therapeutic Exercise) bilateral;flexion;extension;aBduction;aDduction;supine;10 repetitions;2 sets   -DK       Row Name 03/02/25 1224          Knee (Therapeutic Exercise)    Knee (Therapeutic Exercise) AAROM (active assistive range of motion)  -DK     Knee AAROM (Therapeutic Exercise) bilateral;flexion;extension;supine;10 repetitions;2 sets  -DK       Row Name 03/02/25 1224          Ankle (Therapeutic Exercise)    Ankle (Therapeutic Exercise) AAROM (active assistive range of motion)  -DK     Ankle AAROM (Therapeutic Exercise) bilateral;dorsiflexion;plantarflexion;supine;10 repetitions;2 sets  -DK       Row Name 03/02/25 1224          Plan of Care Review    Plan of Care Reviewed With patient  -DK     Progress improving  -DK       Row Name 03/02/25 1224          Positioning and Restraints    Pre-Treatment Position in bed  -DK     Post Treatment Position bed  -DK     In Bed supine;call light within reach;encouraged to call for assist;side rails up x2;legs elevated;heels elevated  -DK               User Key  (r) = Recorded By, (t) = Taken By, (c) = Cosigned By      Initials Name Provider Type    Joselyn Schultz PTA Physical Therapist Assistant                    Physical Therapy Education        No education to display                  PT Recommendation and Plan     Plan of Care Reviewed With: patient  Progress: improving   Outcome Measures       Row Name 03/02/25 1223 02/28/25 1300          How much help from another person do you currently need...    Turning from your back to your side while in flat bed without using bedrails? 4  -DK 4  -LISA     Moving from lying on back to sitting on the side of a flat bed without bedrails? 4  -DK 4  -LISA     Moving to and from a bed to a chair (including a wheelchair)? 3  -DK 3  -LISA     Standing up from a chair using your arms (e.g., wheelchair, bedside chair)? 3  -DK 3  -LISA     Climbing 3-5 steps with a railing? 3  -DK 3  -LISA     To walk in hospital room? 3  -DK 3  -LISA     AM-PAC 6 Clicks Score (PT) 20  -DK 20  -LISA        Functional Assessment    Outcome Measure Options AM-PAC 6  Clicks Basic Mobility (PT)  -DK AM-PAC 6 Clicks Basic Mobility (PT)  -LISA               User Key  (r) = Recorded By, (t) = Taken By, (c) = Cosigned By      Initials Name Provider Type    Joselyn Schultz PTA Physical Therapist Assistant    Corwin Cuadra, PT Physical Therapist                     Time Calculation:    PT Charges       Row Name 03/02/25 1227             Time Calculation    PT Received On 03/02/25  -DK      PT Goal Re-Cert Due Date 03/09/25  -DK         Timed Charges    20323 - PT Therapeutic Exercise Minutes 14  -DK      31987 - Gait Training Minutes  6  -DK      34530 - PT Therapeutic Activity Minutes 6  -DK         Total Minutes    Timed Charges Total Minutes 26  -DK       Total Minutes 26  -DK                User Key  (r) = Recorded By, (t) = Taken By, (c) = Cosigned By      Initials Name Provider Type    Joselyn Schultz PTA Physical Therapist Assistant                  Therapy Charges for Today       Code Description Service Date Service Provider Modifiers Qty    70225951384 HC PT THER PROC EA 15 MIN 3/2/2025 Joselyn Ludwig PTA GP 1    72296021784 HC GAIT TRAINING EA 15 MIN 3/2/2025 Joselyn Ludwig PTA GP 1            PT G-Codes  Outcome Measure Options: AM-PAC 6 Clicks Basic Mobility (PT)  AM-PAC 6 Clicks Score (PT): 20    Joselyn Ludwig PTA  3/2/2025

## 2025-03-02 NOTE — PROGRESS NOTES
[facility]     Progress Note    Patient Name: Marnie Parra  : 1945  MRN: [id number]  Primary Care Physician:  Nicolas Velasquez MD  Date of admission: 2025    Subjective   Subjective     Chief Complaint: Patient has C. difficile colitis has been started on oral vancomycin we will discharge her once her diarrhea has been controlled    HPI: History of rectal cancer now has C. difficile colitis    Review of Systems   All systems were reviewed and negative except for: Has been reviewed    Objective   Objective     Vitals:   Temp:  [97.5 °F (36.4 °C)-98.4 °F (36.9 °C)] 98.1 °F (36.7 °C)  Heart Rate:  [76-84] 76  Resp:  [16-18] 16  BP: (119-137)/(58-68) 119/62    Physical Exam    Constitutional: Alert and oriented not in acute distress   Eyes: Pupils equal reacting to light and accommodation   HENT: Normal   Neck: Normal   Respiratory: No rales or rhonchi   Cardiovascular: S1-S2 normal no S3 or S4   Gastrointestinal: No palpable organomegaly   Musculoskeletal: No deformities   Neurologic: No localizing signs  Skin: No rash       Result Review    Result Review:  I have personally reviewed the results from the time of this admission to 3/2/2025 10:40 EST and agree with these findings:  [x]  Laboratory  [x]  Microbiology  []  Radiology  []  EKG/Telemetry   []  Cardiology/Vascular   []  Pathology  []  Old records  []  Other:  Most notable findings include: Have been reviewed and discussed    Assessment & Plan   Assessment / Plan     Brief Patient Summary:  Marnie Parra is a 79 y.o. female who patient with C. difficile colitis with history of rectal cancer    Active Hospital Problems:  Active Hospital Problems    Diagnosis     **Dehydration        Plan:   Continue oral vancomycin will discharge after diarrhea controlled    VTE Prophylaxis:  No VTE prophylaxis order currently exists.        CODE STATUS:   Level Of Support Discussed With: Patient  Code Status (Patient has no pulse and is not breathing):  CPR (Attempt to Resuscitate)  Medical Interventions (Patient has pulse or is breathing): Full Support    Disposition:  I expect patient to be discharged after diarrhea has controlled.    Electronically signed by Nicolas Velasquez MD, 03/02/25, 10:40 AM EST.      Part of this note may be an electronic transcription/translation of spoken language to printed text using the Dragon Dictation System.

## 2025-03-02 NOTE — PLAN OF CARE
Goal Outcome Evaluation:  Plan of Care Reviewed With: patient           Outcome Evaluation: Alert and oriented. Medicated x1 for pain. Self maintenance of urostomy. Frequent diarrhea with positive test for c diff. Patient states full feeling from hiatal hernia.

## 2025-03-02 NOTE — THERAPY EVALUATION
Acute Care - Speech Language Pathology   Swallow Initial Evaluation  Lin     Patient Name: Marnie Parra  : 1945  MRN: [id number]  Today's Date: 3/2/2025               Admit Date: 2025    Visit Dx:     ICD-10-CM ICD-9-CM   1. Dehydration  E86.0 276.51   2. Difficulty in walking  R26.2 719.7   3. Dysphagia, unspecified type  R13.10 787.20     Patient Active Problem List   Diagnosis    Primary osteoarthritis of left knee    Sepsis    Cellulitis    Ureterolithiasis    Kidney stones, calcium oxalate dihydrate    Hypotension    Rectal mass    Rectal bleed    Rectal cancer    Dysphagia    History of rectal cancer    Dehydration     Past Medical History:   Diagnosis Date    Arthritis     Cancer     bladder    GERD (gastroesophageal reflux disease)     Hyperlipidemia     Hypertension     Kidney stone     PONV (postoperative nausea and vomiting)     Stroke     2016 left side     Past Surgical History:   Procedure Laterality Date    ABDOMINAL SURGERY      APPENDECTOMY      COLONOSCOPY      CYSTECTOMY      bladder removal, with pouch made from stomach    ENDOSCOPY N/A 10/2/2024    Procedure: ESOPHAGOGASTRODUODENOSCOPY WITH BIOPSIES AND 15-18MM BALLOON DILATATION;  Surgeon: Gilberto Wolf MD;  Location: [facility] ENDOSCOPY;  Service: Gastroenterology;  Laterality: N/A;  HIATAL HERNIA, SCHATZKI'S RING, FUNGAL ESOPHAGITIS    HERNIA REPAIR      HYSTERECTOMY      KIDNEY STONE SURGERY      SIGMOIDOSCOPY N/A 7/3/2024    Procedure: FLEXIBLE SIGMOIDOSCOPY TO TRANSVERSE COLON WITH BIOPSIES;  Surgeon: Darius Andujar MD;  Location: [facility] ENDOSCOPY;  Service: Gastroenterology;  Laterality: N/A;  RECTAL MASS, DIVERTICULOSIS    TUBAL ABDOMINAL LIGATION      VENOUS ACCESS DEVICE (PORT) INSERTION Right 2024    Procedure: INSERTION VENOUS ACCESS DEVICE;  Surgeon: Luis Savage MD;  Location: [facility] MAIN OR;  Service: General;  Laterality: Right;       SLP Recommendation and Plan             Inpatient  Speech Pathology Dysphagia Evaluation        PAIN SCALE: Patient did not indicate that he was having any pain at time of evaluation.     PRECAUTIONS/CONTRAINDICATIONS: Contact precautions.  Standard precautions.    SUSPECTED ABUSE/NEGLECT/EXPLOITATION:    None or observed or reported.     SOCIAL/PSYCHOLOGICAL NEEDS/BARRIERS: N/A    PAST SOCIAL HISTORY: Independent at home.    PRIOR FUNCTION: Chronic GI issues.  Patient denies any difficulty with pharyngeal swallow.    PATIENT GOALS/EXPECTATIONS:  To consume least restrictive diet without signs/symptoms of aspiration. To identify type and severity of current deficits and provide appropriate treatment.     HISTORY:  Patient is a 79 y.o. year old female who presents to the emergency department for evaluation of vomiting and diarrhea that is gotten worse over the past 2 days.  Patient has a history of GI issues and reported to SLP that she recently had an EGD with esophageal dilation 3 months ago.    CURRENT DIET LEVEL: Level 0 thin liquids and level 7 regular solids.    OBJECTIVE:    TEST ADMINISTERED: Oral mechanisms exam and clinical swallow evaluation.    COGNITION/SAFETY AWARENESS:   Patient's cognition appeared to be within normal limits. Patient denied any brain fog or difficulty with attention, memory, or problem solving.    BEHAVIORAL OBSERVATIONS:  Patient was pleasant and cooperative. Patient did not demonstrate any instances of anomia or paraphasias. Patient denied any difficulty with auditory comprehension. Patient was appropriate during conversation, answered open ended questions well, and had good topic maintenance.     ORAL MOTOR EXAM:  Patient did not demonstrate any one sided weakness. Patient's coordination and strength of oral mechanisms appears to be within functional limits.     VOICE QUALITY: Normal    REFLEX EXAM: Velopharyngeal function appears to be within normal limits.    POSTURE: Upright for oral care when eating or  drinking.    FEEDING/SWALLOWING FUNCTION:  Oral care provided prior to trials. Patient participated in clinical swallow evaluation and trialed level 0 thin liquids via cup and straw, level 4 pureed solids, level 6 soft and bite sized solids, and level 7 regular solids.  Patient did not demonstrate any overt signs/symptoms of aspiration with any of the consistencies trialed.     CLINICAL OBSERVATIONS:  Patient had good timing and hyolaryngeal excursion. Patient's swallow appears to be within functional limits.  Patient's complaints of occasional difficulty with pills appear to be more noted to esophageal dysphagia or other GI issues as patient indicated the pills get stuck around the area of the middle chest or upper stomach.  Patient would likely benefit from GI consult for GI workup as patient reported recent esophageal dilation which she has not yet followed up with her GI doctor about.    DYSPHAGIA CRITERIA: Reported pills getting stuck.  Chronic GI issues.  Rectal cancer.    FUNCTIONAL ASSESSMENT INSTRUMENT: Patient currently scored a level 7 of 7 on Functional Communication Measures for swallowing indicating a 0% limitation in function.    ASSESSMENT/ PLAN OF CARE:  Pt presents with limitations, noted below, that impede her ability to swallow. The skills of a therapist will be required to safely and effectively implement the following treatment plan to restore maximal level of function.    PROBLEMS:  Patient does not require follow up services at this time as cognition, language, and swallow are within functional limits.  Patient's symptoms align more closely with GI issues.    RECOMMENDATIONS:   1.   DIET: Level 0 thin liquids and level 7 regular solids.  Pills in purée.    2.  POSITION: Upright for oral care and when eating or drinking.  Remain upright at least an hour after eating or drinking.    Patient would likely benefit from follow-up GI consult due to chronic GI issues.    Pt/responsible party agrees  with plan of care and has been informed of all alternatives, risks and benefits.                  Anticipated Discharge Disposition (SLP): No further SLP services warranted (03/02/25 1117)                                                               EDUCATION  The patient has been educated in the following areas:   Dysphagia (Swallowing Impairment).                Time Calculation:    Time Calculation- SLP       Row Name 03/02/25 0900             Time Calculation- SLP    SLP Start Time 0900  -AW      SLP Stop Time 1000  -AW      SLP Time Calculation (min) 60 min  -AW      SLP Received On 03/02/25  -AW         Untimed Charges    03355-HH Eval Oral Pharyng Swallow Minutes 60  -AW         Total Minutes    Untimed Charges Total Minutes 60  -AW       Total Minutes 60  -AW                User Key  (r) = Recorded By, (t) = Taken By, (c) = Cosigned By      Initials Name Provider Type    Sameera Jeff SLP Speech and Language Pathologist                    Therapy Charges for Today       Code Description Service Date Service Provider Modifiers Qty    49270147128 HC ST EVAL ORAL PHARYNG SWALLOW 4 3/2/2025 Sameera Leary SLP GN 1                 JERAIME Fletcher  3/2/2025

## 2025-03-02 NOTE — PLAN OF CARE
Goal Outcome Evaluation:  Plan of Care Reviewed With: patient        Progress: improving  Outcome Evaluation: Patient a&ox4. Medicated x1 for c/o pain near right lower abdomen. Continuous fluids maintained per MAR.

## 2025-03-02 NOTE — PLAN OF CARE
Goal Outcome Evaluation:  Plan of Care Reviewed With: patient        Progress: no change  Outcome Evaluation: Patient A/Ox4, medicated for nausea x1, c/o pain to abdomen pain declined pain meds when offered and stated she would like to wait until later. Voiding per [place] pouch and she self caths only requires assist to collect supplies. 1 assist for transfer but didnt want to get out of bed but she did sit up on the edge of the bed without assistance.Abdomen slightly distended patient stated that she takes simethicone at home and it helps dr is aware new order obtained. IV fluids maintained per orders. Currently resting in bed with call light in reach and family at bedside.

## 2025-03-03 PROCEDURE — 63710000001 ONDANSETRON ODT 4 MG TABLET DISPERSIBLE: Performed by: INTERNAL MEDICINE

## 2025-03-03 RX ADMIN — Medication 10 ML: at 10:01

## 2025-03-03 RX ADMIN — ONDANSETRON 8 MG: 4 TABLET, ORALLY DISINTEGRATING ORAL at 14:25

## 2025-03-03 RX ADMIN — PANTOPRAZOLE SODIUM 40 MG: 40 TABLET, DELAYED RELEASE ORAL at 10:01

## 2025-03-03 RX ADMIN — VANCOMYCIN HYDROCHLORIDE 125 MG: 125 CAPSULE ORAL at 01:01

## 2025-03-03 RX ADMIN — VANCOMYCIN HYDROCHLORIDE 125 MG: 125 CAPSULE ORAL at 12:39

## 2025-03-03 RX ADMIN — TRAMADOL HYDROCHLORIDE 50 MG: 50 TABLET, COATED ORAL at 17:50

## 2025-03-03 RX ADMIN — VANCOMYCIN HYDROCHLORIDE 125 MG: 125 CAPSULE ORAL at 05:56

## 2025-03-03 RX ADMIN — TRAMADOL HYDROCHLORIDE 50 MG: 50 TABLET, COATED ORAL at 03:23

## 2025-03-03 RX ADMIN — VANCOMYCIN HYDROCHLORIDE 125 MG: 125 CAPSULE ORAL at 17:49

## 2025-03-03 NOTE — PROGRESS NOTES
[facility]     Progress Note    Patient Name: Marnie Parra  : 1945  MRN: [id number]  Primary Care Physician:  Nicolas Velasquez MD  Date of admission: 2025    Subjective   Subjective     Chief Complaint: Patient stable and doing well but having still loose BMs we will continue with the current management patient has C. difficile colitis patient wants to be DNR    HPI: Patient with C. difficile colitis wants to be DNR will continue on oral vancomycin    Review of Systems   All systems were reviewed and negative except for: Has been reviewed    Objective   Objective     Vitals:   Temp:  [98.1 °F (36.7 °C)-99.2 °F (37.3 °C)] 98.9 °F (37.2 °C)  Heart Rate:  [79-94] 81  Resp:  [16] 16  BP: (125-153)/(51-70) 135/63    Physical Exam    Constitutional: Alert and oriented not in acute distress   Eyes: Pupils equal reacting to light and accommodation   HENT: Normal   Neck: Normal   Respiratory: No rales or rhonchi   Cardiovascular: S1-S2 normal no S3 or S4   Gastrointestinal: [place] pouch with ileostomy   Musculoskeletal: No deformities   Neurologic: No localizing signs  Skin: No rash       Result Review    Result Review:  I have personally reviewed the results from the time of this admission to 3/3/2025 07:43 EST and agree with these findings:  [x]  Laboratory  []  Microbiology  []  Radiology  []  EKG/Telemetry   []  Cardiology/Vascular   []  Pathology  []  Old records  []  Other:  Most notable findings include: Has been reviewed and discussed    Assessment & Plan   Assessment / Plan     Brief Patient Summary:  Marnie aPrra is a 79 y.o. female who patient stable but has C. difficile colitis diarrhea    Active Hospital Problems:  Active Hospital Problems    Diagnosis     **Dehydration        Plan:   Will make her DNR and will continue with the current management    VTE Prophylaxis:  No VTE prophylaxis order currently exists.        CODE STATUS:   Level Of Support Discussed With: Patient  Code Status  (Patient has no pulse and is not breathing): CPR (Attempt to Resuscitate)  Medical Interventions (Patient has pulse or is breathing): Full Support    Disposition:  I expect patient to be discharged after diarrhea is controlled.    Electronically signed by Nicolas Velasquez MD, 03/03/25, 7:43 AM EST.      Part of this note may be an electronic transcription/translation of spoken language to printed text using the Dragon Dictation System.

## 2025-03-03 NOTE — PLAN OF CARE
Goal Outcome Evaluation:  Plan of Care Reviewed With: patient        Progress: no change  Outcome Evaluation: PT is AAOx4, VSS, pain managed with PRN Tramadol, [place] pouch ostomy WNL, pt self caths, x1 assist with transfers, declines to have port accessed, has advanced directive for DNR, Dr. Velasquez made aware from previous nurse and needs to place order, tolerating PO Vancomycin, remains in SPORE iso, no acute events this shift, bed in low/locked position, call light within reach, continue with current POC at this time.

## 2025-03-03 NOTE — NURSING NOTE
Per pt. And family request they would like no more IV pokes this shift, pt. Does have a port that she is ok with accessing but wants to wait until the morning. Dr. Velasquez notified and is ok with this. Family and patient also state they do not wish to be a Full support but DNR which we do have on file and signed October 2023.

## 2025-03-03 NOTE — PLAN OF CARE
Goal Outcome Evaluation:              Outcome Evaluation: Patient alert and oriented x 4, calm, cooperative and pleasant.  VS WNL.  LCTA.  Complaints of pain x 1 this shift that was addressed with pain meds, repositioning, and pillow placement.  Patient states effective.  Patient has a non-accessed (R) chest port - PT wishes to not have labs, sticks and does not want port accessed.  Patient has an [place] urostomy pouch to R upper abd that she self caths without issue.  Patient had several diarrhea episodes this shift.  Family in to see patient and supportive.  Patient resting in bed with call light in reach.

## 2025-03-04 LAB
ANION GAP SERPL CALCULATED.3IONS-SCNC: 5.7 MMOL/L (ref 5–15)
ANISOCYTOSIS BLD QL: NORMAL
BACTERIA SPEC AEROBE CULT: NORMAL
BACTERIA SPEC AEROBE CULT: NORMAL
BASOPHILS # BLD AUTO: 0.02 10*3/MM3 (ref 0–0.2)
BASOPHILS NFR BLD AUTO: 1 % (ref 0–1.5)
BUN SERPL-MCNC: 5 MG/DL (ref 8–23)
BUN/CREAT SERPL: 8.3 (ref 7–25)
CALCIUM SPEC-SCNC: 8.1 MG/DL (ref 8.6–10.5)
CHLORIDE SERPL-SCNC: 103 MMOL/L (ref 98–107)
CO2 SERPL-SCNC: 29.3 MMOL/L (ref 22–29)
CREAT SERPL-MCNC: 0.6 MG/DL (ref 0.57–1)
DEPRECATED RDW RBC AUTO: 43.7 FL (ref 37–54)
EGFRCR SERPLBLD CKD-EPI 2021: 92 ML/MIN/1.73
EOSINOPHIL # BLD AUTO: 0.17 10*3/MM3 (ref 0–0.4)
EOSINOPHIL NFR BLD AUTO: 8.4 % (ref 0.3–6.2)
ERYTHROCYTE [DISTWIDTH] IN BLOOD BY AUTOMATED COUNT: 15.1 % (ref 12.3–15.4)
GLUCOSE SERPL-MCNC: 87 MG/DL (ref 65–99)
HCT VFR BLD AUTO: 29.4 % (ref 34–46.6)
HGB BLD-MCNC: 9.2 G/DL (ref 12–15.9)
HYPOCHROMIA BLD QL: NORMAL
IMM GRANULOCYTES # BLD AUTO: 0.02 10*3/MM3 (ref 0–0.05)
IMM GRANULOCYTES NFR BLD AUTO: 1 % (ref 0–0.5)
LYMPHOCYTES # BLD AUTO: 0.61 10*3/MM3 (ref 0.7–3.1)
LYMPHOCYTES NFR BLD AUTO: 30.2 % (ref 19.6–45.3)
MAGNESIUM SERPL-MCNC: 1.7 MG/DL (ref 1.6–2.4)
MCH RBC QN AUTO: 26.7 PG (ref 26.6–33)
MCHC RBC AUTO-ENTMCNC: 31.3 G/DL (ref 31.5–35.7)
MCV RBC AUTO: 85.5 FL (ref 79–97)
MONOCYTES # BLD AUTO: 0.62 10*3/MM3 (ref 0.1–0.9)
MONOCYTES NFR BLD AUTO: 30.7 % (ref 5–12)
NEUTROPHILS NFR BLD AUTO: 0.58 10*3/MM3 (ref 1.7–7)
NEUTROPHILS NFR BLD AUTO: 28.7 % (ref 42.7–76)
NRBC BLD AUTO-RTO: 0 /100 WBC (ref 0–0.2)
PLATELET # BLD AUTO: 191 10*3/MM3 (ref 140–450)
PMV BLD AUTO: 9.2 FL (ref 6–12)
POTASSIUM SERPL-SCNC: 2.9 MMOL/L (ref 3.5–5.2)
POTASSIUM SERPL-SCNC: 4.1 MMOL/L (ref 3.5–5.2)
RBC # BLD AUTO: 3.44 10*6/MM3 (ref 3.77–5.28)
SMALL PLATELETS BLD QL SMEAR: ADEQUATE
SODIUM SERPL-SCNC: 138 MMOL/L (ref 136–145)
WBC MORPH BLD: NORMAL
WBC NRBC COR # BLD AUTO: 2.02 10*3/MM3 (ref 3.4–10.8)

## 2025-03-04 PROCEDURE — 80048 BASIC METABOLIC PNL TOTAL CA: CPT | Performed by: INTERNAL MEDICINE

## 2025-03-04 PROCEDURE — 63710000001 ONDANSETRON ODT 4 MG TABLET DISPERSIBLE: Performed by: INTERNAL MEDICINE

## 2025-03-04 PROCEDURE — 85007 BL SMEAR W/DIFF WBC COUNT: CPT | Performed by: INTERNAL MEDICINE

## 2025-03-04 PROCEDURE — 85025 COMPLETE CBC W/AUTO DIFF WBC: CPT | Performed by: INTERNAL MEDICINE

## 2025-03-04 PROCEDURE — 84132 ASSAY OF SERUM POTASSIUM: CPT | Performed by: INTERNAL MEDICINE

## 2025-03-04 PROCEDURE — 83735 ASSAY OF MAGNESIUM: CPT | Performed by: INTERNAL MEDICINE

## 2025-03-04 RX ORDER — POTASSIUM CHLORIDE 1500 MG/1
40 TABLET, EXTENDED RELEASE ORAL EVERY 4 HOURS
Status: DISPENSED | OUTPATIENT
Start: 2025-03-04 | End: 2025-03-04

## 2025-03-04 RX ADMIN — POTASSIUM CHLORIDE 40 MEQ: 1500 TABLET, EXTENDED RELEASE ORAL at 08:31

## 2025-03-04 RX ADMIN — PANTOPRAZOLE SODIUM 40 MG: 40 TABLET, DELAYED RELEASE ORAL at 08:32

## 2025-03-04 RX ADMIN — VANCOMYCIN HYDROCHLORIDE 125 MG: 125 CAPSULE ORAL at 05:38

## 2025-03-04 RX ADMIN — VANCOMYCIN HYDROCHLORIDE 125 MG: 125 CAPSULE ORAL at 00:09

## 2025-03-04 RX ADMIN — TRAMADOL HYDROCHLORIDE 50 MG: 50 TABLET, COATED ORAL at 00:17

## 2025-03-04 RX ADMIN — ONDANSETRON 8 MG: 4 TABLET, ORALLY DISINTEGRATING ORAL at 16:39

## 2025-03-04 RX ADMIN — VANCOMYCIN HYDROCHLORIDE 125 MG: 125 CAPSULE ORAL at 13:30

## 2025-03-04 RX ADMIN — Medication 10 ML: at 08:32

## 2025-03-04 RX ADMIN — POTASSIUM CHLORIDE 40 MEQ: 1500 TABLET, EXTENDED RELEASE ORAL at 13:29

## 2025-03-04 RX ADMIN — VANCOMYCIN HYDROCHLORIDE 125 MG: 125 CAPSULE ORAL at 18:07

## 2025-03-04 NOTE — PLAN OF CARE
Goal Outcome Evaluation:  Plan of Care Reviewed With: patient        Progress: no change  Outcome Evaluation: VSS, port is not accessed. Trasnfer from 2N today. C/O abd/epigastric pain. Continue plan of care.

## 2025-03-04 NOTE — PLAN OF CARE
Goal Outcome Evaluation:  Plan of Care Reviewed With: patient        Progress: improving  Outcome Evaluation: Pt alert and oriented x 4, VSS. Pt c/o abdominal pain x 1 this shift which was managed with prn Tramadol. Pt is DNR, and declines PAC access and lab draws. Pt had 3 loose stools during this shift, and has self-cathed urostomy pouch without issue. Pt is awaiting discharge planning.

## 2025-03-04 NOTE — PROGRESS NOTES
[facility]     Progress Note    Patient Name: Marnie Parra  : 1945  MRN: [id number]  Primary Care Physician:  Nicolas Velasquez MD  Date of admission: 2025    Subjective   Subjective     Chief Complaint: Patient is stable and doing well she is DNR as per her wishes at this time she is appears to be stable and doing well she states that she had only 1 loose BM potassium supplements will be continued    HPI: Patient with C. difficile colitis on vancomycin protocol    Review of Systems   All systems were reviewed and negative except for: Has been reviewed    Objective   Objective     Vitals:   Temp:  [97.7 °F (36.5 °C)-98.3 °F (36.8 °C)] 97.8 °F (36.6 °C)  Heart Rate:  [74-86] 82  Resp:  [16-18] 18  BP: (122-162)/(59-78) 122/59    Physical Exam    Constitutional: Alert and oriented not in acute distress   Eyes: Pupils equal reacting to light and accommodation   HENT: Normal   Neck: Normal   Respiratory: No rales or rhonchi   Cardiovascular: S1-S2 normal no S3 or S4   Gastrointestinal: No palpable organomegaly patient does have urostomy [place] pouch   Musculoskeletal: No deformities   Neurologic: No localizing signs  Skin: No rash       Result Review    Result Review:  I have personally reviewed the results from the time of this admission to 3/4/2025 08:03 EST and agree with these findings:  [x]  Laboratory  []  Microbiology  []  Radiology  []  EKG/Telemetry   []  Cardiology/Vascular   []  Pathology  []  Old records  []  Other:  Most notable findings include: Has been reviewed and discussed    Assessment & Plan   Assessment / Plan     Brief Patient Summary:  aMrnie Parra is a 79 y.o. female who patient with hypokalemia with C. difficile colitis and diarrhea    Active Hospital Problems:  Active Hospital Problems    Diagnosis     **Dehydration        Plan:   Continue the current management    VTE Prophylaxis:  No VTE prophylaxis order currently exists.        CODE STATUS:   Code Status (Patient  has no pulse and is not breathing): No CPR (Do Not Attempt to Resuscitate)  Medical Interventions (Patient has pulse or is breathing): Comfort Measures    Disposition:  I expect patient to be discharged after the patient has been stabilized.    Electronically signed by Nicolas Velasquez MD, 03/04/25, 8:03 AM EST.      Part of this note may be an electronic transcription/translation of spoken language to printed text using the Dragon Dictation System.

## 2025-03-05 LAB
ANION GAP SERPL CALCULATED.3IONS-SCNC: 7.7 MMOL/L (ref 5–15)
BUN SERPL-MCNC: 4 MG/DL (ref 8–23)
BUN/CREAT SERPL: 7.5 (ref 7–25)
CALCIUM SPEC-SCNC: 7.9 MG/DL (ref 8.6–10.5)
CHLORIDE SERPL-SCNC: 104 MMOL/L (ref 98–107)
CO2 SERPL-SCNC: 24.3 MMOL/L (ref 22–29)
CREAT SERPL-MCNC: 0.53 MG/DL (ref 0.57–1)
EGFRCR SERPLBLD CKD-EPI 2021: 94.8 ML/MIN/1.73
GLUCOSE SERPL-MCNC: 92 MG/DL (ref 65–99)
POTASSIUM SERPL-SCNC: 3.6 MMOL/L (ref 3.5–5.2)
SODIUM SERPL-SCNC: 136 MMOL/L (ref 136–145)
WHOLE BLOOD HOLD SPECIMEN: NORMAL

## 2025-03-05 PROCEDURE — 80048 BASIC METABOLIC PNL TOTAL CA: CPT | Performed by: INTERNAL MEDICINE

## 2025-03-05 PROCEDURE — 63710000001 ONDANSETRON ODT 4 MG TABLET DISPERSIBLE: Performed by: INTERNAL MEDICINE

## 2025-03-05 RX ADMIN — SIMETHICONE 80 MG: 80 TABLET, CHEWABLE ORAL at 12:28

## 2025-03-05 RX ADMIN — VANCOMYCIN HYDROCHLORIDE 125 MG: 125 CAPSULE ORAL at 06:13

## 2025-03-05 RX ADMIN — VANCOMYCIN HYDROCHLORIDE 125 MG: 125 CAPSULE ORAL at 00:18

## 2025-03-05 RX ADMIN — SIMETHICONE 80 MG: 80 TABLET, CHEWABLE ORAL at 17:45

## 2025-03-05 RX ADMIN — TRAMADOL HYDROCHLORIDE 50 MG: 50 TABLET, COATED ORAL at 00:18

## 2025-03-05 RX ADMIN — PANTOPRAZOLE SODIUM 40 MG: 40 TABLET, DELAYED RELEASE ORAL at 08:42

## 2025-03-05 RX ADMIN — SIMETHICONE 80 MG: 80 TABLET, CHEWABLE ORAL at 09:42

## 2025-03-05 RX ADMIN — VANCOMYCIN HYDROCHLORIDE 125 MG: 125 CAPSULE ORAL at 12:29

## 2025-03-05 RX ADMIN — Medication 10 ML: at 08:42

## 2025-03-05 RX ADMIN — VANCOMYCIN HYDROCHLORIDE 125 MG: 125 CAPSULE ORAL at 17:45

## 2025-03-05 RX ADMIN — ONDANSETRON 8 MG: 4 TABLET, ORALLY DISINTEGRATING ORAL at 00:18

## 2025-03-05 RX ADMIN — VANCOMYCIN HYDROCHLORIDE 125 MG: 125 CAPSULE ORAL at 23:01

## 2025-03-05 RX ADMIN — TRAMADOL HYDROCHLORIDE 50 MG: 50 TABLET, COATED ORAL at 23:01

## 2025-03-05 NOTE — PROGRESS NOTES
[facility]     Progress Note    Patient Name: Marnie Parra  : 1945  MRN: [id number]  Primary Care Physician:  Nicolas Velasquez MD  Date of admission: 2025    Subjective   Subjective     Chief Complaint: Had 4 loose BMs will continue the current management hypokalemia resolved    HPI: C. difficile colitis but stable    Review of Systems   All systems were reviewed and negative except for: Has been reviewed    Objective   Objective     Vitals:   Temp:  [98 °F (36.7 °C)-98.6 °F (37 °C)] 98 °F (36.7 °C)  Heart Rate:  [68-80] 68  Resp:  [18] 18  BP: (131-149)/(55-67) 149/67    Physical Exam    Constitutional: Alert and oriented not in acute distress   Eyes: Pupils equal reacting to light and accommodation   HENT: Normal   Neck: Normal   Respiratory: No rales or rhonchi   Cardiovascular: S1-S2 normal no S3 or S4   Gastrointestinal: No palpable organomegaly patient has [place] pouch urostomy   Musculoskeletal: No deformities   Neurologic: No localizing signs  Skin: No rash       Result Review    Result Review:  I have personally reviewed the results from the time of this admission to 3/5/2025 08:30 EST and agree with these findings:  [x]  Laboratory  []  Microbiology  []  Radiology  []  EKG/Telemetry   []  Cardiology/Vascular   []  Pathology  []  Old records  []  Other:  Most notable findings include: Have been reviewed and discussed    Assessment & Plan   Assessment / Plan     Brief Patient Summary:  Marnie Parra is a 79 y.o. female who patient with multiple health issues but currently admitted because of C. difficile colitis    Active Hospital Problems:  Active Hospital Problems    Diagnosis     **Dehydration        Plan:   Continue on vancomycin    VTE Prophylaxis:  No VTE prophylaxis order currently exists.        CODE STATUS:   Code Status (Patient has no pulse and is not breathing): No CPR (Do Not Attempt to Resuscitate)  Medical Interventions (Patient has pulse or is breathing): Comfort  Measures    Disposition:  I expect patient to be discharged after patient has been stabilized.    Electronically signed by Nicolas Velasquez MD, 03/05/25, 8:30 AM EST.      Part of this note may be an electronic transcription/translation of spoken language to printed text using the Dragon Dictation System.

## 2025-03-05 NOTE — PLAN OF CARE
Problem: Adult Inpatient Plan of Care  Goal: Plan of Care Review  Outcome: Progressing  Flowsheets  Taken 3/5/2025 0442 by aHnnah Chen LPN  Progress: no change  Taken 3/4/2025 1755 by Racquel Hamilton RN  Plan of Care Reviewed With: patient  Goal: Patient-Specific Goal (Individualized)  Outcome: Progressing  Goal: Absence of Hospital-Acquired Illness or Injury  Outcome: Progressing  Intervention: Identify and Manage Fall Risk  Recent Flowsheet Documentation  Taken 3/5/2025 0320 by Hannah Chen LPN  Safety Promotion/Fall Prevention: safety round/check completed  Taken 3/5/2025 0122 by Hannah Chen LPN  Safety Promotion/Fall Prevention: safety round/check completed  Taken 3/4/2025 2302 by Hannah Chen LPN  Safety Promotion/Fall Prevention: safety round/check completed  Taken 3/4/2025 2126 by Hannah Chen LPN  Safety Promotion/Fall Prevention: safety round/check completed  Taken 3/4/2025 1942 by Hannah Chen LPN  Safety Promotion/Fall Prevention: safety round/check completed  Intervention: Prevent Skin Injury  Recent Flowsheet Documentation  Taken 3/4/2025 2302 by Hannah Chen LPN  Skin Protection:   skin sealant/moisture barrier applied   incontinence pads utilized  Intervention: Prevent Infection  Recent Flowsheet Documentation  Taken 3/4/2025 2302 by Hannah Chen LPN  Infection Prevention:   personal protective equipment utilized   hand hygiene promoted   equipment surfaces disinfected   single patient room provided   visitors restricted/screened  Goal: Optimal Comfort and Wellbeing  Outcome: Progressing  Intervention: Provide Person-Centered Care  Recent Flowsheet Documentation  Taken 3/4/2025 2302 by Hannah Chen LPN  Trust Relationship/Rapport:   care explained   choices provided   questions answered   reassurance provided   thoughts/feelings acknowledged  Goal: Readiness for Transition of Care  Outcome: Progressing   Goal Outcome Evaluation:           Progress: no change         Comfort measures continued. Pain and nausea meds given once this shift. Loose stools continue. Rested well throughout the night. Will continue plan of care.

## 2025-03-05 NOTE — PLAN OF CARE
"10/31/22    I recommend eye lubrication with artificial tear drops liberally to both eyes.  Preservative-free drops are best; some brands include: Celluvisc, Refresh, Systane, Blink, Optive.      Also, use lubricating artificial tear ointment at bedtime in both eyes very night.  Genteal and Refresh PM are preservative-free; generic brands and Lacrilube are not.    1 drop of pataday every morning both eyes   Claritin in place of zyrtec by mouth daily. Sudafed as needed (can be drying - use artificial tear drops).   Naproxen 250mg-500mg twice a day with meals as needed for 7-10 days. Maximum dose: 1 g/day thereafter.    Instructions for your allergic conjunctivitis:     Wash your hands frequently and do not touch your face.  If you have to use a tissue to wipe your eyes, use it once and then throw it away.     Rinse the eyelids with cool water (and wash with baby shampoo in addition if you like) in the morning and at bedtime. (You can do this as many times daily as you like. Rinsing the lashes will cool water removes allergens and soothes the eyes.)     Use cool compresses as frequently as you like to soothe the eyes.       Use artificial tear drops as much as you like to soothe both eyes.  Preservative-free brands are best to avoid allergies to preservatives and further irritation of your eyes.  Some brands include: Celluvisc, Refresh, Systane, Blink, Optive.       If the above methods do not relieve your symptoms, you may try Patanol, Pataday, or Ketotifen eye drops, which are available over the counter.  Ask your pharmacist for availability of anti-allergy eye drops.     Similarly, if nasal congestion and other allergy symptoms are bothersome, try Claritin or Zyrtec or other oral anti-allergy medicines that are available over the counter. This will help the eyes as well. Your pharmacist can help with questions.     Do NOT use Visine, Clear Eyes, or any \"anti-redness\" eye drops.  These can worsen your eye redness " Goal Outcome Evaluation:              Outcome Evaluation: Pt is alert and orient x4.  VS WNl for pt norm.  Pt able to self cath self if items set up for her.  Pt states her norm pain level is around a 2/3.  Pt chooses not to take any prn pain medications.  Pt perfers to take her simethicone with he rmeals to help with gas and bloating.                              and irritation over time.        -------------------  Lily is ready to graduate to our adult comprehensive ophthalmology service where they can care for her vision and ocular health. I recommend follow up in 1 year with Dr. Amador Fallon.     Dr. Fallon: [email or web address]    To schedule an appointment, call [phone].

## 2025-03-05 NOTE — SIGNIFICANT NOTE
03/05/25 0643   Physical Therapy Time and Intention   Session Not Performed   (Pt is now on COMFORT MEASURES. DC PT at this time.)

## 2025-03-06 LAB
ANION GAP SERPL CALCULATED.3IONS-SCNC: 9.2 MMOL/L (ref 5–15)
BUN SERPL-MCNC: 3 MG/DL (ref 8–23)
BUN/CREAT SERPL: 6 (ref 7–25)
CALCIUM SPEC-SCNC: 7.9 MG/DL (ref 8.6–10.5)
CHLORIDE SERPL-SCNC: 107 MMOL/L (ref 98–107)
CO2 SERPL-SCNC: 23.8 MMOL/L (ref 22–29)
CREAT SERPL-MCNC: 0.5 MG/DL (ref 0.57–1)
EGFRCR SERPLBLD CKD-EPI 2021: 96.1 ML/MIN/1.73
GLUCOSE SERPL-MCNC: 104 MG/DL (ref 65–99)
POTASSIUM SERPL-SCNC: 3.2 MMOL/L (ref 3.5–5.2)
SODIUM SERPL-SCNC: 140 MMOL/L (ref 136–145)
WHOLE BLOOD HOLD SPECIMEN: NORMAL

## 2025-03-06 PROCEDURE — 80048 BASIC METABOLIC PNL TOTAL CA: CPT | Performed by: INTERNAL MEDICINE

## 2025-03-06 RX ADMIN — ACETAMINOPHEN 650 MG: 325 TABLET ORAL at 05:42

## 2025-03-06 RX ADMIN — VANCOMYCIN HYDROCHLORIDE 125 MG: 125 CAPSULE ORAL at 17:23

## 2025-03-06 RX ADMIN — SIMETHICONE 80 MG: 80 TABLET, CHEWABLE ORAL at 17:23

## 2025-03-06 RX ADMIN — VANCOMYCIN HYDROCHLORIDE 125 MG: 125 CAPSULE ORAL at 05:40

## 2025-03-06 RX ADMIN — SIMETHICONE 80 MG: 80 TABLET, CHEWABLE ORAL at 08:19

## 2025-03-06 RX ADMIN — PANTOPRAZOLE SODIUM 40 MG: 40 TABLET, DELAYED RELEASE ORAL at 08:18

## 2025-03-06 RX ADMIN — Medication 10 ML: at 08:20

## 2025-03-06 RX ADMIN — SIMETHICONE 80 MG: 80 TABLET, CHEWABLE ORAL at 12:22

## 2025-03-06 RX ADMIN — VANCOMYCIN HYDROCHLORIDE 125 MG: 125 CAPSULE ORAL at 12:22

## 2025-03-06 NOTE — PLAN OF CARE
Goal Outcome Evaluation:  Plan of Care Reviewed With: patient        Progress: no change  Outcome Evaluation: vss. Medicated for pain as ordered. Still having multiple loose stools. No acute events noted overnight

## 2025-03-06 NOTE — PROGRESS NOTES
[facility]     Progress Note    Patient Name: Marnie Parra  : 1945  MRN: [id number]  Primary Care Physician:  Nicolas Velasquez MD  Date of admission: 2025    Subjective   Subjective     Chief Complaint: Patient has C. difficile colitis continues to have multiple stones she is on oral vancomycin protocol which will be continued    HPI: Patient with multiple bloody stools feeling very weak    Review of Systems   All systems were reviewed and negative except for: Has been reviewed    Objective   Objective     Vitals:   Temp:  [97.7 °F (36.5 °C)-98.2 °F (36.8 °C)] 98.2 °F (36.8 °C)  Heart Rate:  [73-81] 73  Resp:  [16] 16  BP: (121-146)/(55-63) 121/55    Physical Exam    Constitutional: Alert and oriented not in acute distress   Eyes: Pupils equal reacting to light and accommodation   HENT: Normal   Neck: Normal   Respiratory: No rales or rhonchi   Cardiovascular: S1-S2 normal no S3 or S4   Gastrointestinal: No palpable organomegaly   Musculoskeletal: No deformities   Neurologic: No localizing signs  Skin: No rash       Result Review    Result Review:  I have personally reviewed the results from the time of this admission to 3/6/2025 08:00 EST and agree with these findings:  [x]  Laboratory  []  Microbiology  []  Radiology  []  EKG/Telemetry   []  Cardiology/Vascular   []  Pathology  []  Old records  []  Other:  Most notable findings include: Has been reviewed    Assessment & Plan   Assessment / Plan     Brief Patient Summary:  Marnie Parra is a 79 y.o. female who patient with multiple stools C. difficile colitis    Active Hospital Problems:  Active Hospital Problems    Diagnosis     **Dehydration        Plan:   Continue current management    VTE Prophylaxis:  No VTE prophylaxis order currently exists.        CODE STATUS:   Medical Intervention Limits: No intubation (DNI)  Code Status (Patient has no pulse and is not breathing): No CPR (Do Not Attempt to Resuscitate)  Medical Interventions  (Patient has pulse or is breathing): Limited Support    Disposition:  I expect patient to be discharged after the diarrhea has been controlled.    Electronically signed by Nicolas Velasquez MD, 03/06/25, 8:00 AM EST.      Part of this note may be an electronic transcription/translation of spoken language to printed text using the Dragon Dictation System.

## 2025-03-06 NOTE — PLAN OF CARE
Goal Outcome Evaluation:              Outcome Evaluation: Pt is alert and orient x4.  Up using BSC this shift.  VS wnl for pt norm.

## 2025-03-07 LAB
ANION GAP SERPL CALCULATED.3IONS-SCNC: 6.3 MMOL/L (ref 5–15)
BUN SERPL-MCNC: 3 MG/DL (ref 8–23)
BUN/CREAT SERPL: 5.6 (ref 7–25)
CALCIUM SPEC-SCNC: 8.4 MG/DL (ref 8.6–10.5)
CHLORIDE SERPL-SCNC: 108 MMOL/L (ref 98–107)
CO2 SERPL-SCNC: 25.7 MMOL/L (ref 22–29)
CREAT SERPL-MCNC: 0.54 MG/DL (ref 0.57–1)
EGFRCR SERPLBLD CKD-EPI 2021: 94.4 ML/MIN/1.73
GLUCOSE SERPL-MCNC: 104 MG/DL (ref 65–99)
POTASSIUM SERPL-SCNC: 3.3 MMOL/L (ref 3.5–5.2)
SODIUM SERPL-SCNC: 140 MMOL/L (ref 136–145)
WHOLE BLOOD HOLD SPECIMEN: NORMAL

## 2025-03-07 PROCEDURE — 80048 BASIC METABOLIC PNL TOTAL CA: CPT | Performed by: INTERNAL MEDICINE

## 2025-03-07 RX ADMIN — Medication 10 ML: at 21:00

## 2025-03-07 RX ADMIN — PANTOPRAZOLE SODIUM 40 MG: 40 TABLET, DELAYED RELEASE ORAL at 09:24

## 2025-03-07 RX ADMIN — VANCOMYCIN HYDROCHLORIDE 125 MG: 125 CAPSULE ORAL at 06:09

## 2025-03-07 RX ADMIN — VANCOMYCIN HYDROCHLORIDE 125 MG: 125 CAPSULE ORAL at 18:30

## 2025-03-07 RX ADMIN — VANCOMYCIN HYDROCHLORIDE 125 MG: 125 CAPSULE ORAL at 12:44

## 2025-03-07 RX ADMIN — TRAMADOL HYDROCHLORIDE 50 MG: 50 TABLET, COATED ORAL at 18:34

## 2025-03-07 RX ADMIN — ACETAMINOPHEN 650 MG: 325 TABLET ORAL at 22:48

## 2025-03-07 RX ADMIN — VANCOMYCIN HYDROCHLORIDE 125 MG: 125 CAPSULE ORAL at 00:45

## 2025-03-07 RX ADMIN — TRAMADOL HYDROCHLORIDE 50 MG: 50 TABLET, COATED ORAL at 04:09

## 2025-03-07 NOTE — PLAN OF CARE
Goal Outcome Evaluation:              Outcome Evaluation: Pt is alert and oriented.  Pt had 5 bm's this shift. Tramadol given once this shift.  VSS.  Continue POC.

## 2025-03-07 NOTE — CONSULTS
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 Recent chemotherapy for Rectal CA       Anthropometrics        Current Height, Weight Height: 167.6 cm (66\")  Weight: 57.4 kg (126 lb 8.7 oz)   Current BMI Body mass index is 20.42 kg/m².   BMI Classification Underweight w/ optimal BMI for age: 23-30   % IBW 97% IBW   Adjusted Body Weight (ABW) N/A   Weight Hx  Wt Readings from Last 30 Encounters:   02/27/25 0821 57.4 kg (126 lb 8.7 oz)   02/03/25 0957 58.5 kg (129 lb)   10/02/24 0650 56.8 kg (125 lb 3.5 oz)   08/17/24 1737 59.2 kg (130 lb 8.2 oz)   07/23/24 1200 59.2 kg (130 lb 8.2 oz)   07/03/24 0605 63 kg (138 lb 14.2 oz)   07/02/24 2251 59.4 kg (130 lb 15.3 oz)   10/27/23 1756 58.9 kg (129 lb 13.6 oz)   10/02/23 1305 59.9 kg (132 lb 0.9 oz)   09/06/23 1058 62.6 kg (138 lb 0.1 oz)   02/15/23 1709 62.6 kg (138 lb)   11/30/22 1430 60.8 kg (134 lb)   10/03/22 1737 60.8 kg (134 lb)   05/13/22 1454 66.7 kg (147 lb)   10/04/21 1312 66.7 kg (147 lb)   03/22/21 0000 68 kg (150 lb)   03/10/21 0000 68.2 kg (150 lb 4 oz)   07/03/18 0000 76.2 kg (168 lb)          Wt Change Observation Wt maintaining in range of 125-130lbs past 6 months.      Estimated/Assessed Needs  Estimated Needs based on: Current Body Weight 59kg       Energy Requirements 30-35 kcal/kg    EST Needs (kcal/day) 1770 - 2065 kcals/d       Protein Requirements 1.2-1.3 g/kg   EST Daily Needs (g/day) 71 - 77gms/d " "      Fluid Requirements 1 ml/kcal    Estimated Needs (mL/day) 1770 - 2065mls/d     Labs/Medications         Pertinent Labs Reviewed. Low K+   Results from last 7 days   Lab Units 03/07/25  0537 03/06/25  0614 03/05/25  0621   SODIUM mmol/L 140 140 136   POTASSIUM mmol/L 3.3* 3.2* 3.6   CHLORIDE mmol/L 108* 107 104   CO2 mmol/L 25.7 23.8 24.3   BUN mg/dL 3* 3* 4*   CREATININE mg/dL 0.54* 0.50* 0.53*   CALCIUM mg/dL 8.4* 7.9* 7.9*   GLUCOSE mg/dL 104* 104* 92     Results from last 7 days   Lab Units 03/04/25  0430 03/02/25  0418 03/01/25  0421   MAGNESIUM mg/dL 1.7  --  1.5*   HEMOGLOBIN g/dL 9.2*   < > 8.7*   HEMATOCRIT % 29.4*   < > 27.2*    < > = values in this interval not displayed.     COVID19   Date Value Ref Range Status   02/27/2025 Not Detected Not Detected - Ref. Range Final     No results found for: \"HGBA1C\"      Pertinent Medications Reviewed.     Malnutrition Severity Assessment              Nutrition Diagnosis         Nutrition Dx Problem 1 Increased nutrient needs related to increased nutrient needs due to catabolic disease as evidenced by  +C.Diff and recent chemotherapy w/ Rectal CA     Nutrition Intervention           Current Nutrition Orders & Evaluation of Intake       Current PO Diet Diet: Regular/House; Fluid Consistency: Thin (IDDSI 0)   Supplement No active supplement orders      Average po intake ~45% which provides ~1035 kcals, 49gms prot and meets ~ 50 -60% est kcal needs & 64 -69% est prot needs     Nutrition Intervention/Prescription        Cont Regular Diet  Add Banatrol twice daily to help w/ diarrhea r/t C.Diff  Add Magic Cup w/ Lunch for additonal kcals/prot ~290kcals, 9gms prot per serving  Add Fortified Yogurt @ BK ~128kcals, 28gms prot per serving        Medical Nutrition Therapy/Nutrition Education          Learner     Readiness Patient  Acceptance     Method     Response Explanation  Verbalizes understanding     Monitor/Evaluation        Monitor Per protocol, PO intake, " Supplement intake, GI status, POC/GOC     Nutrition Discharge Plan         Recommend to continue oral nutrition supplements on discharge.      Electronically signed by:  Brenda Méndez RD  03/07/25 16:56 EST

## 2025-03-07 NOTE — PROGRESS NOTES
[facility]     Progress Note    Patient Name: Marnie Parra  : 1945  MRN: [id number]  Primary Care Physician:  Nicolas Velasquez MD  Date of admission: 2025    Subjective   Subjective     Chief Complaint: Patient stable and doing well she is still having loose BMs about 4 of them we will therefore continue to monitor serum potassium low patient is on replacement protocol    HPI: History of rectal cancer has been treated with capecitabine but now has C. difficile colitis    Review of Systems   All systems were reviewed and negative except for: Been reviewed    Objective   Objective     Vitals:   Temp:  [98.1 °F (36.7 °C)] 98.1 °F (36.7 °C)  Heart Rate:  [78] 78  Resp:  [16] 16  BP: (154)/(69) 154/69    Physical Exam    Constitutional: Alert and oriented not in acute distress   Eyes: Pupils equal reacting to light and accommodation   HENT: Normal   Neck: Normal   Respiratory: No rales or rhonchi   Cardiovascular: S1-S2 normal no S3 or S4   Gastrointestinal: No palpable organomegaly   Musculoskeletal: No deformities   Neurologic: No localizing signs  Skin: No rash       Result Review    Result Review:  I have personally reviewed the results from the time of this admission to 3/7/2025 10:59 EST and agree with these findings:  [x]  Laboratory  []  Microbiology  []  Radiology  []  EKG/Telemetry   []  Cardiology/Vascular   []  Pathology  []  Old records  []  Other:  Most notable findings include: Has been reviewed and discussed    Assessment & Plan   Assessment / Plan     Brief Patient Summary:  Marnie Parra is a 79 y.o. female who patient with C. difficile colitis rectal cancer status post chemo with capecitabine    Active Hospital Problems:  Active Hospital Problems    Diagnosis     **Dehydration        Plan:   Continue the current management    VTE Prophylaxis:  No VTE prophylaxis order currently exists.        CODE STATUS:   Medical Intervention Limits: No intubation (DNI)  Code Status (Patient  has no pulse and is not breathing): No CPR (Do Not Attempt to Resuscitate)  Medical Interventions (Patient has pulse or is breathing): Limited Support    Disposition:  I expect patient to be discharged with diarrhea has been controlled.    Electronically signed by Nicolas Velasquez MD, 03/07/25, 10:59 AM EST.      Part of this note may be an electronic transcription/translation of spoken language to printed text using the Dragon Dictation System.

## 2025-03-07 NOTE — PLAN OF CARE
Goal Outcome Evaluation:  Plan of Care Reviewed With: patient        Progress: no change  Outcome Evaluation: Pt. remains A & O X 4. Hydrating well, drinking small amount of coffee and ginger ale per request. Pt did have 3x BM's between 2777-2571 this shift. Tramadol given once per orders for pain in abdomen.

## 2025-03-08 RX ORDER — POTASSIUM CHLORIDE 750 MG/1
40 CAPSULE, EXTENDED RELEASE ORAL 2 TIMES DAILY WITH MEALS
Status: DISCONTINUED | OUTPATIENT
Start: 2025-03-08 | End: 2025-03-10 | Stop reason: HOSPADM

## 2025-03-08 RX ADMIN — PANTOPRAZOLE SODIUM 40 MG: 40 TABLET, DELAYED RELEASE ORAL at 08:03

## 2025-03-08 RX ADMIN — POTASSIUM CHLORIDE 40 MEQ: 750 CAPSULE, EXTENDED RELEASE ORAL at 10:58

## 2025-03-08 RX ADMIN — VANCOMYCIN HYDROCHLORIDE 125 MG: 125 CAPSULE ORAL at 17:20

## 2025-03-08 RX ADMIN — VANCOMYCIN HYDROCHLORIDE 125 MG: 125 CAPSULE ORAL at 05:00

## 2025-03-08 RX ADMIN — VANCOMYCIN HYDROCHLORIDE 125 MG: 125 CAPSULE ORAL at 12:56

## 2025-03-08 RX ADMIN — VANCOMYCIN HYDROCHLORIDE 125 MG: 125 CAPSULE ORAL at 00:06

## 2025-03-08 RX ADMIN — POTASSIUM CHLORIDE 40 MEQ: 750 CAPSULE, EXTENDED RELEASE ORAL at 17:20

## 2025-03-08 NOTE — PLAN OF CARE
Goal Outcome Evaluation:  Plan of Care Reviewed With: patient; family        Progress: no change  Outcome Evaluation: VSS. Patient AAOx4. Patient still having frequent, loose stool. Contact spore and comfort measures maintained. Family at bedside throughout shift. No other concerns or complaints. Continue plan of care.

## 2025-03-08 NOTE — PLAN OF CARE
Goal Outcome Evaluation:         Pt lying in bed. A/O x 4. No complaints of pain. Pt remains in contact isolation for C. Diff. No acute events this shift

## 2025-03-08 NOTE — PROGRESS NOTES
[facility]     Progress Note    Patient Name: Marnie Parra  : 1945  MRN: [id number]  Primary Care Physician:  Nicolas Velasquez MD  Date of admission: 2025    Subjective   Subjective     Chief Complaint: Stable and doing well had 4 loose BM yesterday 2 so far today will observe will give potassium supplements    HPI: Patient with C. difficile colitis some of it probably because of chemotherapy capecitabine    Review of Systems   All systems were reviewed and negative except for: Reviewed    Objective   Objective     Vitals:   Temp:  [97.3 °F (36.3 °C)-97.7 °F (36.5 °C)] 97.7 °F (36.5 °C)  Heart Rate:  [73-82] 73  Resp:  [16] 16  BP: (123-135)/(63-66) 135/63    Physical Exam    Constitutional: Alert and oriented not in acute distress   Eyes: Pupils equal reacting to light and accommodation   HENT: Normal   Neck: Normal   Respiratory: No rales or rhonchi   Cardiovascular: S1-S2 normal no S3 or S4   Gastrointestinal: No palpable organomegaly   Musculoskeletal: No deformities   Neurologic: No localizing signs  Skin: No rash       Result Review    Result Review:  I have personally reviewed the results from the time of this admission to 3/8/2025 10:33 EST and agree with these findings:  [x]  Laboratory  []  Microbiology  []  Radiology  []  EKG/Telemetry   []  Cardiology/Vascular   []  Pathology  []  Old records  []  Other:  Most notable findings include: Has been reviewed and discussed    Assessment & Plan   Assessment / Plan     Brief Patient Summary:  Marnie Parra is a 79 y.o. female who patient with C. difficile colitis continues to have some diarrhea    Active Hospital Problems:  Active Hospital Problems    Diagnosis     **Dehydration        Plan:   Continue current management    VTE Prophylaxis:  No VTE prophylaxis order currently exists.        CODE STATUS:   Code Status (Patient has no pulse and is not breathing): No CPR (Do Not Attempt to Resuscitate)  Medical Interventions (Patient has  pulse or is breathing): Limited Support  Medical Intervention Limits: No intubation (DNI)    Disposition:  I expect patient to be discharged hopefully tomorrow if the diarrhea is controlled.    Electronically signed by Nicolas Velasquez MD, 03/08/25, 10:33 AM EST.      Part of this note may be an electronic transcription/translation of spoken language to printed text using the Dragon Dictation System.

## 2025-03-09 LAB
ANION GAP SERPL CALCULATED.3IONS-SCNC: 7.1 MMOL/L (ref 5–15)
BUN SERPL-MCNC: 6 MG/DL (ref 8–23)
BUN/CREAT SERPL: 12 (ref 7–25)
CALCIUM SPEC-SCNC: 8.1 MG/DL (ref 8.6–10.5)
CHLORIDE SERPL-SCNC: 109 MMOL/L (ref 98–107)
CO2 SERPL-SCNC: 24.9 MMOL/L (ref 22–29)
CREAT SERPL-MCNC: 0.5 MG/DL (ref 0.57–1)
EGFRCR SERPLBLD CKD-EPI 2021: 96.1 ML/MIN/1.73
GLUCOSE SERPL-MCNC: 84 MG/DL (ref 65–99)
MAGNESIUM SERPL-MCNC: 1.4 MG/DL (ref 1.6–2.4)
POTASSIUM SERPL-SCNC: 3.6 MMOL/L (ref 3.5–5.2)
SODIUM SERPL-SCNC: 141 MMOL/L (ref 136–145)

## 2025-03-09 PROCEDURE — 83735 ASSAY OF MAGNESIUM: CPT | Performed by: INTERNAL MEDICINE

## 2025-03-09 PROCEDURE — 80048 BASIC METABOLIC PNL TOTAL CA: CPT | Performed by: INTERNAL MEDICINE

## 2025-03-09 RX ORDER — LOPERAMIDE HYDROCHLORIDE 2 MG/1
2 CAPSULE ORAL 3 TIMES DAILY PRN
Status: DISCONTINUED | OUTPATIENT
Start: 2025-03-09 | End: 2025-03-10 | Stop reason: HOSPADM

## 2025-03-09 RX ORDER — CHOLESTYRAMINE 4 G/9G
1 POWDER, FOR SUSPENSION ORAL EVERY 12 HOURS SCHEDULED
Status: COMPLETED | OUTPATIENT
Start: 2025-03-09 | End: 2025-03-10

## 2025-03-09 RX ADMIN — CHOLESTYRAMINE 1 PACKET: 4 POWDER, FOR SUSPENSION ORAL at 11:07

## 2025-03-09 RX ADMIN — POTASSIUM CHLORIDE 40 MEQ: 750 CAPSULE, EXTENDED RELEASE ORAL at 08:08

## 2025-03-09 RX ADMIN — POTASSIUM CHLORIDE 40 MEQ: 750 CAPSULE, EXTENDED RELEASE ORAL at 17:25

## 2025-03-09 RX ADMIN — VANCOMYCIN HYDROCHLORIDE 125 MG: 125 CAPSULE ORAL at 17:25

## 2025-03-09 RX ADMIN — TRAMADOL HYDROCHLORIDE 50 MG: 50 TABLET, COATED ORAL at 22:40

## 2025-03-09 RX ADMIN — VANCOMYCIN HYDROCHLORIDE 125 MG: 125 CAPSULE ORAL at 00:31

## 2025-03-09 RX ADMIN — VANCOMYCIN HYDROCHLORIDE 125 MG: 125 CAPSULE ORAL at 11:07

## 2025-03-09 RX ADMIN — VANCOMYCIN HYDROCHLORIDE 125 MG: 125 CAPSULE ORAL at 06:36

## 2025-03-09 RX ADMIN — PANTOPRAZOLE SODIUM 40 MG: 40 TABLET, DELAYED RELEASE ORAL at 08:09

## 2025-03-09 RX ADMIN — CHOLESTYRAMINE 1 PACKET: 4 POWDER, FOR SUSPENSION ORAL at 20:20

## 2025-03-09 NOTE — PLAN OF CARE
Goal Outcome Evaluation:  Plan of Care Reviewed With: patient        Progress: no change  Outcome Evaluation: VSS. Patient AAOx4. Patient still having frequent, loose stool. Contact spore maintained. Family at bedside throughout shift. No other concerns or complaints. Continue plan of care.

## 2025-03-09 NOTE — PROGRESS NOTES
[facility]     Progress Note    Patient Name: Marnie Parra  : 1945  MRN: [id number]  Primary Care Physician:  Nicolas Velasquez MD  Date of admission: 2025    Subjective   Subjective     Chief Complaint: Discussed the case with gastroenterology Dr. Avalos recommendation is to give Questran and a low-dose of Imodium for persistent severe diarrhea patient has C. difficile but she also has had chemo induced diarrhea because of capecitabine, combination of the 2 hopefully will help    HPI: Patient not toxic but has continuous diarrhea which is probably combination of chemotherapy and her rectal cancer as well as C. difficile    Review of Systems   All systems were reviewed and negative except for: Has been reviewed    Objective   Objective     Vitals:   Temp:  [97.3 °F (36.3 °C)-98.3 °F (36.8 °C)] 97.9 °F (36.6 °C)  Heart Rate:  [80-85] 82  Resp:  [18] 18  BP: (124-147)/(52-62) 132/58    Physical Exam    Constitutional: Alert and oriented not in acute distress   Eyes: Pupils equal reacting to light and accommodation   HENT: Normal   Neck: Normal   Respiratory: No rales or rhonchi   Cardiovascular: S1-S2 normal no S3 or S4   Gastrointestinal: No palpable organomegaly patient has ileostomy [place] pouch   Musculoskeletal: No deformities   Neurologic: No localizing signs  Skin: No rash       Result Review    Result Review:  I have personally reviewed the results from the time of this admission to 3/9/2025 11:00 EDT and agree with these findings:  [x]  Laboratory  []  Microbiology  []  Radiology  []  EKG/Telemetry   []  Cardiology/Vascular   []  Pathology  []  Old records  []  Other:  Most notable findings include: Has been reviewed and discussed    Assessment & Plan   Assessment / Plan     Brief Patient Summary:  Marnie Parra is a 79 y.o. female who patient with C. difficile colitis as well as capecitabine induced diarrhea with history of rectal cancer    Active Hospital Problems:  Active Hospital  Problems    Diagnosis     **Dehydration        Plan:   Continue oral vancomycin    VTE Prophylaxis:  No VTE prophylaxis order currently exists.        CODE STATUS:   Code Status (Patient has no pulse and is not breathing): No CPR (Do Not Attempt to Resuscitate)  Medical Interventions (Patient has pulse or is breathing): Limited Support  Medical Intervention Limits: No intubation (DNI)    Disposition:  I expect patient to be discharged after the diarrhea has been controlled.    Electronically signed by Nicolas Velasquez MD, 03/09/25, 11:00 AM EDT.      Part of this note may be an electronic transcription/translation of spoken language to printed text using the Dragon Dictation System.

## 2025-03-10 ENCOUNTER — READMISSION MANAGEMENT (OUTPATIENT)
Dept: CALL CENTER | Facility: HOSPITAL | Age: 80
End: 2025-03-10
Payer: MEDICARE

## 2025-03-10 VITALS
SYSTOLIC BLOOD PRESSURE: 149 MMHG | TEMPERATURE: 97.5 F | WEIGHT: 126.54 LBS | HEART RATE: 75 BPM | DIASTOLIC BLOOD PRESSURE: 65 MMHG | RESPIRATION RATE: 18 BRPM | BODY MASS INDEX: 20.34 KG/M2 | HEIGHT: 66 IN | OXYGEN SATURATION: 97 %

## 2025-03-10 LAB
ANION GAP SERPL CALCULATED.3IONS-SCNC: 8.6 MMOL/L (ref 5–15)
BUN SERPL-MCNC: 10 MG/DL (ref 8–23)
BUN/CREAT SERPL: 16.7 (ref 7–25)
CALCIUM SPEC-SCNC: 8.1 MG/DL (ref 8.6–10.5)
CHLORIDE SERPL-SCNC: 109 MMOL/L (ref 98–107)
CO2 SERPL-SCNC: 22.4 MMOL/L (ref 22–29)
CREAT SERPL-MCNC: 0.6 MG/DL (ref 0.57–1)
EGFRCR SERPLBLD CKD-EPI 2021: 92 ML/MIN/1.73
GLUCOSE SERPL-MCNC: 81 MG/DL (ref 65–99)
POTASSIUM SERPL-SCNC: 3.9 MMOL/L (ref 3.5–5.2)
SODIUM SERPL-SCNC: 140 MMOL/L (ref 136–145)
WHOLE BLOOD HOLD SPECIMEN: NORMAL

## 2025-03-10 PROCEDURE — 80048 BASIC METABOLIC PNL TOTAL CA: CPT | Performed by: INTERNAL MEDICINE

## 2025-03-10 RX ORDER — VANCOMYCIN HYDROCHLORIDE 125 MG/1
125 CAPSULE ORAL EVERY 6 HOURS SCHEDULED
Qty: 21 CAPSULE | Refills: 0 | Status: SHIPPED | OUTPATIENT
Start: 2025-03-10 | End: 2025-03-16

## 2025-03-10 RX ORDER — LOPERAMIDE HYDROCHLORIDE 2 MG/1
2 CAPSULE ORAL 3 TIMES DAILY PRN
Qty: 30 CAPSULE | Refills: 1 | Status: SHIPPED | OUTPATIENT
Start: 2025-03-10

## 2025-03-10 RX ORDER — SIMETHICONE 80 MG
80 TABLET,CHEWABLE ORAL 4 TIMES DAILY PRN
Qty: 60 TABLET | Refills: 2 | Status: SHIPPED | OUTPATIENT
Start: 2025-03-10 | End: 2025-04-24

## 2025-03-10 RX ADMIN — POTASSIUM CHLORIDE 40 MEQ: 750 CAPSULE, EXTENDED RELEASE ORAL at 08:15

## 2025-03-10 RX ADMIN — VANCOMYCIN HYDROCHLORIDE 125 MG: 125 CAPSULE ORAL at 07:44

## 2025-03-10 RX ADMIN — PANTOPRAZOLE SODIUM 40 MG: 40 TABLET, DELAYED RELEASE ORAL at 08:15

## 2025-03-10 RX ADMIN — VANCOMYCIN HYDROCHLORIDE 125 MG: 125 CAPSULE ORAL at 12:07

## 2025-03-10 RX ADMIN — VANCOMYCIN HYDROCHLORIDE 125 MG: 125 CAPSULE ORAL at 00:11

## 2025-03-10 RX ADMIN — ACETAMINOPHEN 650 MG: 325 TABLET ORAL at 02:49

## 2025-03-10 RX ADMIN — Medication 10 ML: at 08:15

## 2025-03-10 RX ADMIN — CHOLESTYRAMINE 1 PACKET: 4 POWDER, FOR SUSPENSION ORAL at 08:15

## 2025-03-10 NOTE — OUTREACH NOTE
Prep Survey      Flowsheet Row Responses   [facility] facility patient discharged from? [facility]   Is LACE score < 7 ? No   Eligibility Readm Mgmt   Discharge diagnosis Dehydration   Does the patient have one of the following disease processes/diagnoses(primary or secondary)? Other   Does the patient have Home health ordered? No   Is there a DME ordered? No   Prep survey completed? Yes            Clemencia ZIMMERMAN - Registered Nurse

## 2025-03-10 NOTE — PLAN OF CARE
Goal Outcome Evaluation:         Pt A/O x 4. Complains of pain throughout the shift and requests PRN pain medication. Continues to self cath urostomy needing minimal nurse assist. No concerns at this time.

## 2025-03-10 NOTE — PLAN OF CARE
Goal Outcome Evaluation:              Outcome Evaluation: aox4, pleasant patient. no loose stools this shift so far. urostomy clean and intact, patient managing independently. medicated for c/o pain per orders. up ad feli in room. discharge home via private vehicle with family.

## 2025-03-10 NOTE — DISCHARGE SUMMARY
[facility]         DISCHARGE SUMMARY    Patient Name: Marnie Parra  : 1945  MRN: [id number]    Date of Admission: 2025  Date of Discharge: 3/10/2025  Primary Care Physician: Nicolas Velasquez MD    Consults       Date and Time Order Name Status Description    2025  2:33 PM General MD Inpatient Consult              Presenting Problem:   Dehydration [E86.0]    Active and Resolved Hospital Problems:  Active Hospital Problems    Diagnosis POA   • **Dehydration [E86.0] Yes      Resolved Hospital Problems   No resolved problems to display.         Hospital Course     Hospital Course:  Marnie Parra is a 79 y.o. female patient admitted with diarrhea she is known to have rectal cancer was treated with chemotherapy capecitabine and started having diarrhea C. difficile was positive the dose of vancomycin was increased discussed the case with Dr. Avalos gastroenterology and it was decided to give her Questran for the control of her diarrhea because we do not know whether it is because of the cancer or because of the combination of C. difficile as well as chemotherapy and cancer however we have also increased the amount of vancomycin to be given for 4 more days she is much better has not had any bowel movement since yesterday and is therefore being discharged in stable condition      DISCHARGE Follow Up Recommendations for labs and diagnostics: Patient with history of C. difficile and rectal cancer with diarrhea      Pertinent  and/or Most Recent Results     LAB RESULTS:      Lab 25  0430   WBC 2.02*   HEMOGLOBIN 9.2*   HEMATOCRIT 29.4*   PLATELETS 191   NEUTROS ABS 0.58*   IMMATURE GRANS (ABS) 0.02   LYMPHS ABS 0.61*   MONOS ABS 0.62   EOS ABS 0.17   MCV 85.5         Lab 03/10/25  0537 25  0603 25  0537 25  0614 25  0621 25  0430   SODIUM 140 141 140 140 136  --  138   POTASSIUM 3.9 3.6 3.3* 3.2* 3.6   < > 2.9*   CHLORIDE 109* 109*  108* 107 104  --  103   CO2 22.4 24.9 25.7 23.8 24.3  --  29.3*   ANION GAP 8.6 7.1 6.3 9.2 7.7  --  5.7   BUN 10 6* 3* 3* 4*  --  5*   CREATININE 0.60 0.50* 0.54* 0.50* 0.53*  --  0.60   EGFR 92.0 96.1 94.4 96.1 94.8  --  92.0   GLUCOSE 81 84 104* 104* 92  --  87   CALCIUM 8.1* 8.1* 8.4* 7.9* 7.9*  --  8.1*   MAGNESIUM  --  1.4*  --   --   --   --  1.7    < > = values in this interval not displayed.                         Brief Urine Lab Results  (Last result in the past 365 days)        Color   Clarity   Blood   Leuk Est   Nitrite   Protein   CREAT   Urine HCG        02/27/25 0916 Yellow   Cloudy   Moderate (2+)   Moderate (2+)   Positive   >=300 mg/dL (3+)                 Microbiology Results (last 10 days)       Procedure Component Value - Date/Time    Clostridioides difficile Toxin - Stool, Per Rectum [[id number]]  (Abnormal) Collected: 03/01/25 1231    Lab Status: Final result Specimen: Stool from Per Rectum Updated: 03/01/25 1414    Narrative:      The following orders were created for panel order Clostridioides difficile Toxin - Stool, Per Rectum.  Procedure                               Abnormality         Status                     ---------                               -----------         ------                     Clostridioides difficile...[[id number]]  Abnormal            Final result                 Please view results for these tests on the individual orders.    Clostridioides difficile Toxin, PCR - Stool, Per Rectum [[id number]]  (Abnormal) Collected: 03/01/25 1231    Lab Status: Final result Specimen: Stool from Per Rectum Updated: 03/01/25 1414     Toxigenic C. difficile by PCR Positive     027 Toxin Presumptive Negative    Narrative:      DNA from a toxigenic strain of C.difficile has been detected. Antigen testing for the presence of free C.difficile toxin is currently in progress, to help determine the clinical significance of this PCR result.     Clostridioides difficile toxin Ag, Reflex -  Stool, Per Rectum [[id number]]  (Normal) Collected: 03/01/25 1231    Lab Status: Final result Specimen: Stool from Per Rectum Updated: 03/01/25 1414     C.diff Toxin Ag Negative    Narrative:      DNA from a toxigenic strain of C.difficile was detected, although the free toxin itself was not detected. These findings are consistent with C.difficile colonization and may not reflect actual C.difficile infection. Clinical correlation needed.            CT Abdomen Pelvis With Contrast  Result Date: 2/27/2025  Impression: Impression: 1.Extensive postsurgical changes as described. 2.Stable moderate right hydronephrosis with a prominent extrarenal pelvis. There are left-sided parapelvic cysts with no hydronephrosis. There is ureteral diversion secondary to the patient's cystectomy and creation of a right lower quadrant ileal loop. 3.There is mild asymmetric thickening of the left lateral wall of the rectum which could represent either residual tumor or scarring from treated rectal cancer. 4.There is bowel wall edema and increased mucosal enhancement involving the descending colon and sigmoid colon as well as several small bowel loops in the mid to lower pelvis. This suggest an underlying enterocolitis. There is also a questionable gastritis. 5.There is a loop of the mid transverse colon which protrudes through a widemouth wall defect where the patient has a mesh in place. There is no evidence of ischemia or obstruction. 6.Additional findings as noted above. Electronically Signed: Reese Hickey MD  2/27/2025 11:10 AM EST  Workstation ID: [id number]                  Labs Pending at Discharge:        Discharge Details        Discharge Medications        ASK your doctor about these medications        Instructions Start Date   capecitabine 500 MG chemo tablet  Commonly known as: XELODA   2 tablets, Oral, 2 Times Daily      Lomotil 2.5-0.025 MG per tablet  Generic drug: diphenoxylate-atropine   1 tablet, 4 Times Daily      ondansetron  ODT 8 MG disintegrating tablet  Commonly known as: ZOFRAN-ODT  Ask about: Which instructions should I use?   8 mg, Every 8 Hours PRN      pantoprazole 40 MG EC tablet  Commonly known as: PROTONIX  Ask about: Which instructions should I use?   40 mg, Oral, Daily      traMADol 50 MG tablet  Commonly known as: Ultram   50 mg, Oral, Every 6 Hours PRN               Allergies   Allergen Reactions   • Levofloxacin Other (See Comments)     TORE ROTATOR CUFF  Other reaction(s): Rotator cuff tear arthropathy     • Propofol GI Intolerance   • Dilaudid [Hydromorphone] Other (See Comments), Mental Status Change and Hallucinations     Dilaudid causes the patient to hallucinate.   • Gabapentin Other (See Comments) and Myalgia   • Adhesive Tape Rash   • Celecoxib Unknown - High Severity and Rash         Discharge Disposition: Discharged home      Diet:  Hospital:  Diet Order   Procedures   • Diet: Regular/House; Fluid Consistency: Thin (IDDSI 0)         Discharge Activity: As tolerated        CODE STATUS:  Code Status and Medical Interventions: No CPR (Do Not Attempt to Resuscitate); Limited Support; No intubation (DNI)   Ordered at: 03/05/25 0945     Code Status (Patient has no pulse and is not breathing):    No CPR (Do Not Attempt to Resuscitate)     Medical Interventions (Patient has pulse or is breathing):    Limited Support     Medical Intervention Limits:    No intubation (DNI)         No future appointments.        Time spent on Discharge including face to face service: 45 minutes    Electronically signed by Nicolas Velasquez MD, 03/10/25, 8:29 AM EDT.    Part of this note may be an electronic transcription/translation of spoken language to printed text using the Dragon Dictation System.

## 2025-03-27 ENCOUNTER — READMISSION MANAGEMENT (OUTPATIENT)
Dept: CALL CENTER | Facility: HOSPITAL | Age: 80
End: 2025-03-27
Payer: MEDICARE

## 2025-03-27 NOTE — OUTREACH NOTE
Medical Week 3 Survey      Flowsheet Row Responses   [facility] facility patient discharged from? [facility]   Does the patient have one of the following disease processes/diagnoses(primary or secondary)? Other   Week 3 attempt successful? No   Unsuccessful attempts Attempt 1            Nadine VALENZUELA - Registered Nurse      Nadine VALENZUELA - Registered Nurse

## 2025-04-02 ENCOUNTER — READMISSION MANAGEMENT (OUTPATIENT)
Dept: CALL CENTER | Facility: HOSPITAL | Age: 80
End: 2025-04-02
Payer: MEDICARE

## 2025-04-02 NOTE — OUTREACH NOTE
Medical Week 3 Survey      Flowsheet Row Responses   [facility] facility patient discharged from? [facility]   Does the patient have one of the following disease processes/diagnoses(primary or secondary)? Other   Week 3 attempt successful? No   Unsuccessful attempts Attempt 2            DANIEL RUIZ - Registered Nurse

## 2025-05-19 ENCOUNTER — OFFICE VISIT (OUTPATIENT)
Dept: ORTHOPEDIC SURGERY | Facility: CLINIC | Age: 80
End: 2025-05-19
Payer: MEDICARE

## 2025-05-19 DIAGNOSIS — M17.12 PRIMARY OSTEOARTHRITIS OF LEFT KNEE: Primary | ICD-10-CM

## 2025-05-19 RX ORDER — CAPECITABINE 500 MG/1
TABLET, FILM COATED ORAL EVERY 12 HOURS SCHEDULED
COMMUNITY
Start: 2025-01-02

## 2025-05-19 RX ORDER — CARBAMAZEPINE 100 MG/1
TABLET, CHEWABLE ORAL
COMMUNITY
Start: 2025-05-09

## 2025-05-19 RX ORDER — MECOBALAMIN 1000 MCG
TABLET,CHEWABLE ORAL EVERY 24 HOURS
COMMUNITY
Start: 2025-04-28

## 2025-05-19 RX ADMIN — TRIAMCINOLONE ACETONIDE 40 MG: 40 INJECTION, SUSPENSION INTRA-ARTICULAR; INTRAMUSCULAR at 15:45

## 2025-05-19 RX ADMIN — LIDOCAINE HYDROCHLORIDE 5 ML: 10 INJECTION, SOLUTION INFILTRATION; PERINEURAL at 15:45

## 2025-05-19 NOTE — PROGRESS NOTES
Chief Complaint  Pain and Follow-up of the Left Knee     Subjective        History of Present Illness  The patient is a 80-year-old female here to follow up on her left knee arthritis. She has left knee arthritis that has been managed conservatively with intermittent injections. She was last seen on 02/03/2025 and received a Synvisc injection. Previous Synvisc injections have provided approximately 5 months of relief. She has been undergoing chemotherapy for rectal cancer and was uncertain about the safety of steroid injections during her last visit.    Today patient presents and reports that she is finished with chemotherapy.  She reports pain around the entire kneecap and both joint spaces that seem to be severe and increased this past Friday.  She would like to continue with nonsurgical options given all that she has been through recently with her rectal cancer.  Topical Voltaren gel has not been effective.  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  Patient is hoping for a steroid injection today to try to calm things down.    She has completed 4 rounds of chemotherapy and is scheduled for a scan on 06/02/2025. She is unable to undergo radiation therapy due to excessive scar tissue.      Allergies   Allergen Reactions    Levofloxacin Other (See Comments)     TORE ROTATOR CUFF  Other reaction(s): Rotator cuff tear arthropathy      Propofol GI Intolerance    Dilaudid [Hydromorphone] Other (See Comments), Mental Status Change and Hallucinations     Dilaudid causes the patient to hallucinate.    Gabapentin Myalgia and Other (See Comments)     Other Reaction(s): Unknown---- ALL Related NEUROTIN    Drops blood pressure    Adhesive Tape Rash    Celecoxib Rash, Unknown - High Severity and Other (See Comments)        Social History     Socioeconomic History    Marital status:    Tobacco Use    Smoking status: Some Days     Current packs/day: 1.00     Average packs/day:  1 pack/day for 59.4 years (59.4 ttl pk-yrs)     Types: Cigarettes     Start date: 1/5/1966    Smokeless tobacco: Never    Tobacco comments:     Last 7/23/24   Vaping Use    Vaping status: Never Used   Substance and Sexual Activity    Alcohol use: Never    Drug use: Never    Sexual activity: Defer        Tobacco Use: High Risk (5/20/2025)    Patient History     Smoking Tobacco Use: Some Days     Smokeless Tobacco Use: Never     Passive Exposure: Not on file     Patient reports tobacco use. Tobacco use can delay healing and complicate the recovery process. Recommended tobacco cessation for optimal healing and health benefits.     I reviewed the patient's chief complaint, history of present illness, review of systems, past medical history, surgical history, family history, social history, medications, and allergy list.     Review of Systems     Constitutional: Denies fevers, chills, weight loss  Cardiovascular: Denies chest pain, shortness of breath  Skin: Denies rashes, acute skin changes  Neurologic: Denies headache, loss of consciousness    Vital Signs:   There were no vitals taken for this visit.         Physical Exam  General: Alert. No acute distress    Ortho Exam    Left knee: Patient demonstrates active knee flexion and extension approximately 0 to 120 degrees.  Knee extensor mechanism intact with knee stable to varus and valgus stress.  She is tender to both joint lines.  Mild crepitus noted with range of motion.  Physical Exam        Large Joint Arthrocentesis: L knee  Date/Time: 5/19/2025 3:45 PM  Consent given by: patient  Site marked: site marked  Timeout: Immediately prior to procedure a time out was called to verify the correct patient, procedure, equipment, support staff and site/side marked as required   Supporting Documentation  Indications: pain   Procedure Details  Location: knee - L knee  Preparation: Patient was prepped and draped in the usual sterile fashion  Needle gauge: 21 G.  Approach:  lateral  Medications administered: 5 mL lidocaine 1 %; 40 mg triamcinolone acetonide 40 MG/ML  Patient tolerance: patient tolerated the procedure well with no immediate complications      This injection documentation was Scribed for Sarina Matta PA-C by Stephy Tavarez.  05/19/25   15:45 EDT          Assessment and Plan     Diagnoses and all orders for this visit:    1. Primary osteoarthritis of left knee (Primary)    Other orders  -     Large Joint Arthrocentesis: L knee        Assessment & Plan  1. Left knee arthritis:    Left knee Steroid injection administered in office today and tolerated the procedure well without complications.  Patient advised on 3 months duration between injections.     Advised of diabetic to monitor blood glucose levels closely for the next 24 to 48 hours.    Discussed the risk, benefits and alternatives of injection.  Discussed potential complications such as increased pain, swelling and tenderness at the injection site.  Possibility of reaction.  Possibility that injection may not improve pain. Risk of infection.  Possibility of worsening pain after injection. Steroid injections can increase blood glucose levels and may be damaged bone if given over prolonged period of time.  Patient verbalized understanding and gives verbal consent to proceed.    Would like to go ahead and schedule her next gel injection.    The next gel injection is scheduled for 08/04/2025. If needed, a follow-up steroid injection can be considered 6 weeks after the gel injection to potentially extend the duration of relief.        Patient or patient representative verbalized consent for the use of Ambient Listening during the visit with  Sarina Matta PA-C for chart documentation. 5/20/2025  12:28 EDT    Follow Up   There are no Patient Instructions on file for this visit.        Patient was given instructions and counseling regarding her condition or for health maintenance advice. Please see specific  information pulled into the AVS if appropriate.     Sarina Matta PA-C   05/19/2025  15:45 EDT        Dictated Utilizing Dragon Dictation. Please note that portions of this note were completed with a voice recognition program. Part of this note may be an electronic transcription/translation of spoken language to printed text using the Dragon Dictation System.

## 2025-05-20 RX ORDER — TRIAMCINOLONE ACETONIDE 40 MG/ML
40 INJECTION, SUSPENSION INTRA-ARTICULAR; INTRAMUSCULAR
Status: COMPLETED | OUTPATIENT
Start: 2025-05-19 | End: 2025-05-19

## 2025-05-20 RX ORDER — LIDOCAINE HYDROCHLORIDE 10 MG/ML
5 INJECTION, SOLUTION INFILTRATION; PERINEURAL
Status: COMPLETED | OUTPATIENT
Start: 2025-05-19 | End: 2025-05-19

## 2025-05-28 ENCOUNTER — TRANSCRIBE ORDERS (OUTPATIENT)
Dept: ADMINISTRATIVE | Facility: HOSPITAL | Age: 80
End: 2025-05-28
Payer: MEDICARE

## 2025-05-28 ENCOUNTER — LAB (OUTPATIENT)
Facility: HOSPITAL | Age: 80
End: 2025-05-28
Payer: MEDICARE

## 2025-05-28 DIAGNOSIS — C20 RECTAL CANCER: Primary | ICD-10-CM

## 2025-05-28 DIAGNOSIS — C20 RECTAL CANCER: ICD-10-CM

## 2025-05-28 LAB
ALBUMIN SERPL-MCNC: 4 G/DL (ref 3.5–5.2)
ALBUMIN/GLOB SERPL: 1.5 G/DL
ALP SERPL-CCNC: 114 U/L (ref 39–117)
ALT SERPL W P-5'-P-CCNC: 14 U/L (ref 1–33)
ANION GAP SERPL CALCULATED.3IONS-SCNC: 7.9 MMOL/L (ref 5–15)
AST SERPL-CCNC: 17 U/L (ref 1–32)
BASOPHILS # BLD AUTO: 0.04 10*3/MM3 (ref 0–0.2)
BASOPHILS NFR BLD AUTO: 0.8 % (ref 0–1.5)
BILIRUB SERPL-MCNC: 0.2 MG/DL (ref 0–1.2)
BUN SERPL-MCNC: 22 MG/DL (ref 8–23)
BUN/CREAT SERPL: 28.2 (ref 7–25)
CALCIUM SPEC-SCNC: 9 MG/DL (ref 8.6–10.5)
CEA SERPL-MCNC: 1.87 NG/ML
CHLORIDE SERPL-SCNC: 110 MMOL/L (ref 98–107)
CO2 SERPL-SCNC: 25.1 MMOL/L (ref 22–29)
CREAT SERPL-MCNC: 0.78 MG/DL (ref 0.57–1)
DEPRECATED RDW RBC AUTO: 44.4 FL (ref 37–54)
EGFRCR SERPLBLD CKD-EPI 2021: 77.4 ML/MIN/1.73
EOSINOPHIL # BLD AUTO: 0.09 10*3/MM3 (ref 0–0.4)
EOSINOPHIL NFR BLD AUTO: 1.7 % (ref 0.3–6.2)
ERYTHROCYTE [DISTWIDTH] IN BLOOD BY AUTOMATED COUNT: 13.6 % (ref 12.3–15.4)
GLOBULIN UR ELPH-MCNC: 2.6 GM/DL
GLUCOSE SERPL-MCNC: 88 MG/DL (ref 65–99)
HCT VFR BLD AUTO: 39.3 % (ref 34–46.6)
HGB BLD-MCNC: 12.3 G/DL (ref 12–15.9)
IMM GRANULOCYTES # BLD AUTO: 0.01 10*3/MM3 (ref 0–0.05)
IMM GRANULOCYTES NFR BLD AUTO: 0.2 % (ref 0–0.5)
LYMPHOCYTES # BLD AUTO: 1.01 10*3/MM3 (ref 0.7–3.1)
LYMPHOCYTES NFR BLD AUTO: 19.2 % (ref 19.6–45.3)
MCH RBC QN AUTO: 27.8 PG (ref 26.6–33)
MCHC RBC AUTO-ENTMCNC: 31.3 G/DL (ref 31.5–35.7)
MCV RBC AUTO: 88.9 FL (ref 79–97)
MONOCYTES # BLD AUTO: 0.46 10*3/MM3 (ref 0.1–0.9)
MONOCYTES NFR BLD AUTO: 8.7 % (ref 5–12)
NEUTROPHILS NFR BLD AUTO: 3.66 10*3/MM3 (ref 1.7–7)
NEUTROPHILS NFR BLD AUTO: 69.4 % (ref 42.7–76)
NRBC BLD AUTO-RTO: 0 /100 WBC (ref 0–0.2)
PLATELET # BLD AUTO: 312 10*3/MM3 (ref 140–450)
PMV BLD AUTO: 10.1 FL (ref 6–12)
POTASSIUM SERPL-SCNC: 4.4 MMOL/L (ref 3.5–5.2)
PROT SERPL-MCNC: 6.6 G/DL (ref 6–8.5)
RBC # BLD AUTO: 4.42 10*6/MM3 (ref 3.77–5.28)
SODIUM SERPL-SCNC: 143 MMOL/L (ref 136–145)
WBC NRBC COR # BLD AUTO: 5.27 10*3/MM3 (ref 3.4–10.8)

## 2025-05-28 PROCEDURE — 36415 COLL VENOUS BLD VENIPUNCTURE: CPT

## 2025-05-28 PROCEDURE — 80053 COMPREHEN METABOLIC PANEL: CPT

## 2025-05-28 PROCEDURE — 85025 COMPLETE CBC W/AUTO DIFF WBC: CPT

## 2025-05-28 PROCEDURE — 82378 CARCINOEMBRYONIC ANTIGEN: CPT

## 2025-06-02 ENCOUNTER — HOSPITAL ENCOUNTER (EMERGENCY)
Facility: HOSPITAL | Age: 80
Discharge: HOME | End: 2025-06-03
Attending: EMERGENCY MEDICINE | Admitting: EMERGENCY MEDICINE
Payer: MEDICARE

## 2025-06-02 DIAGNOSIS — T78.40XA ALLERGIC REACTION, INITIAL ENCOUNTER: Primary | ICD-10-CM

## 2025-06-02 DIAGNOSIS — L50.9 HIVES: ICD-10-CM

## 2025-06-02 PROCEDURE — 25010000002 DIPHENHYDRAMINE PER 50 MG

## 2025-06-02 PROCEDURE — 96375 TX/PRO/DX INJ NEW DRUG ADDON: CPT

## 2025-06-02 PROCEDURE — 99282 EMERGENCY DEPT VISIT SF MDM: CPT

## 2025-06-02 PROCEDURE — 25010000002 METHYLPREDNISOLONE PER 125 MG

## 2025-06-02 PROCEDURE — 96374 THER/PROPH/DIAG INJ IV PUSH: CPT

## 2025-06-02 PROCEDURE — 25010000002 FAMOTIDINE 10 MG/ML SOLUTION

## 2025-06-02 RX ORDER — FAMOTIDINE 10 MG/ML
20 INJECTION, SOLUTION INTRAVENOUS ONCE
Status: COMPLETED | OUTPATIENT
Start: 2025-06-02 | End: 2025-06-02

## 2025-06-02 RX ORDER — DIPHENHYDRAMINE HYDROCHLORIDE 50 MG/ML
25 INJECTION, SOLUTION INTRAMUSCULAR; INTRAVENOUS ONCE
Status: COMPLETED | OUTPATIENT
Start: 2025-06-02 | End: 2025-06-02

## 2025-06-02 RX ADMIN — FAMOTIDINE 20 MG: 10 INJECTION INTRAVENOUS at 23:31

## 2025-06-02 RX ADMIN — DIPHENHYDRAMINE HYDROCHLORIDE 25 MG: 50 INJECTION, SOLUTION INTRAMUSCULAR; INTRAVENOUS at 23:29

## 2025-06-02 RX ADMIN — METHYLPREDNISOLONE SODIUM SUCCINATE 125 MG: 125 INJECTION, POWDER, LYOPHILIZED, FOR SOLUTION INTRAMUSCULAR; INTRAVENOUS at 23:28

## 2025-06-03 VITALS
DIASTOLIC BLOOD PRESSURE: 80 MMHG | WEIGHT: 138.45 LBS | HEIGHT: 68 IN | BODY MASS INDEX: 20.98 KG/M2 | RESPIRATION RATE: 20 BRPM | OXYGEN SATURATION: 98 % | TEMPERATURE: 97.5 F | HEART RATE: 79 BPM | SYSTOLIC BLOOD PRESSURE: 153 MMHG

## 2025-06-03 PROCEDURE — 25810000003 SODIUM CHLORIDE 0.9 % SOLUTION

## 2025-06-03 RX ORDER — CETIRIZINE HYDROCHLORIDE 10 MG/1
10 TABLET ORAL DAILY
Qty: 7 TABLET | Refills: 0 | Status: SHIPPED | OUTPATIENT
Start: 2025-06-03 | End: 2025-06-10

## 2025-06-03 RX ORDER — METHYLPREDNISOLONE 4 MG/1
TABLET ORAL
Qty: 21 TABLET | Refills: 0 | Status: SHIPPED | OUTPATIENT
Start: 2025-06-03 | End: 2025-06-09

## 2025-06-03 RX ADMIN — SODIUM CHLORIDE 1000 ML: 9 INJECTION, SOLUTION INTRAVENOUS at 00:00

## 2025-06-03 NOTE — DISCHARGE INSTRUCTIONS
Follow up with your primary care provider. Return to ER if symptoms worsen or fail to improve.  Take medications as prescribed.

## 2025-06-03 NOTE — ED TRIAGE NOTES
Pt comes to the ER tonight for possible allergic reaction. Pt states that she had an MRI this morning around 0900 and had contrast dye. Pt states that she started having an allergic reaction to something around 2000 tonight. Pt states she has hives and itchiness all over her body. Pt denies any new foods or anything. Pt denies any SOA.

## 2025-06-03 NOTE — ED PROVIDER NOTES
Time: 10:17 PM EDT  Date of encounter:  6/2/2025  Independent Historian/Clinical History and Information was obtained by:   Patient    History is limited by: N/A    Chief Complaint   Patient presents with    Allergic Reaction         History of Present Illness:  Patient is a 80 y.o. year old female who presents to the emergency department for evaluation of possible allergic reaction. She has hives all over her arms and especially her trunk. Denies any SOA. The only new today was that she had an MRI with contrast around 8am today. She has had contrast dye in the past with no issues. Denies any new foods, soap, medication. (Triage Provider: Ari Ragsdale PA-C).    Patient Care Team  Primary Care Provider: Nicolas Velasquez MD    Past Medical History:     Allergies   Allergen Reactions    Levofloxacin Other (See Comments)     TORE ROTATOR CUFF  Other reaction(s): Rotator cuff tear arthropathy      Propofol GI Intolerance    Dilaudid [Hydromorphone] Other (See Comments), Mental Status Change and Hallucinations     Dilaudid causes the patient to hallucinate.    Gabapentin Myalgia and Other (See Comments)     Other Reaction(s): Unknown---- ALL Related NEUROTIN    Drops blood pressure    Adhesive Tape Rash    Celecoxib Rash, Unknown - High Severity and Other (See Comments)    Gadolinium Derivatives (Mr Contrast) Hives     Delayed reaction      Past Medical History:   Diagnosis Date    Arthritis     Cancer     bladder    GERD (gastroesophageal reflux disease)     Hyperlipidemia     Hypertension     Kidney stone     PONV (postoperative nausea and vomiting)     Stroke     2016 left side     Past Surgical History:   Procedure Laterality Date    ABDOMINAL SURGERY      APPENDECTOMY      COLONOSCOPY      CYSTECTOMY      bladder removal, with pouch made from stomach    ENDOSCOPY N/A 10/2/2024    Procedure: ESOPHAGOGASTRODUODENOSCOPY WITH BIOPSIES AND 15-18MM BALLOON DILATATION;  Surgeon: Gilberto Wolf MD;  Location: [facility]  ENDOSCOPY;  Service: Gastroenterology;  Laterality: N/A;  HIATAL HERNIA, SCHATZKI'S RING, FUNGAL ESOPHAGITIS    HERNIA REPAIR      HYSTERECTOMY      KIDNEY STONE SURGERY      SIGMOIDOSCOPY N/A 7/3/2024    Procedure: FLEXIBLE SIGMOIDOSCOPY TO TRANSVERSE COLON WITH BIOPSIES;  Surgeon: Darius Andujar MD;  Location: [facility] ENDOSCOPY;  Service: Gastroenterology;  Laterality: N/A;  RECTAL MASS, DIVERTICULOSIS    TUBAL ABDOMINAL LIGATION      VENOUS ACCESS DEVICE (PORT) INSERTION Right 7/23/2024    Procedure: INSERTION VENOUS ACCESS DEVICE;  Surgeon: Luis Savage MD;  Location: [facility] MAIN OR;  Service: General;  Laterality: Right;     History reviewed. No pertinent family history.    Home Medications:  Prior to Admission medications    Medication Sig Start Date End Date Taking? Authorizing Provider   capecitabine (XELODA) 500 MG chemo tablet Every 12 (Twelve) Hours. 1/2/25   Pavel Sanon MD   carBAMazepine (TEGretol) 100 MG chewable tablet  5/9/25   Pavel Sanon MD   Methylcobalamin (B12-Active) 1 MG chewable tablet Daily. 4/28/25   Pavel Sanon MD   pantoprazole (PROTONIX) 40 MG EC tablet Take 1 tablet by mouth Daily. 10/2/24   Gilberto Wolf MD   traMADol (Ultram) 50 MG tablet Take 1 tablet by mouth Every 6 (Six) Hours As Needed for Moderate Pain or Severe Pain. 7/23/24 7/23/25  Luis Savage MD        Social History:   Social History     Tobacco Use    Smoking status: Some Days     Current packs/day: 1.00     Average packs/day: 1 pack/day for 59.4 years (59.4 ttl pk-yrs)     Types: Cigarettes     Start date: 1/5/1966    Smokeless tobacco: Never    Tobacco comments:     Last 7/23/24   Vaping Use    Vaping status: Never Used   Substance Use Topics    Alcohol use: Never    Drug use: Never         Review of Systems:  Review of Systems   Skin:  Positive for rash.        Physical Exam:  /78 (BP Location: Left arm, Patient Position: Sitting)   Pulse 101   Temp 98.1 °F  "(36.7 °C) (Oral)   Resp 18   Ht 172.7 cm (68\")   Wt 62.8 kg (138 lb 7.2 oz)   SpO2 98%   BMI 21.05 kg/m²         Physical Exam  HENT:      Head: Normocephalic.      Mouth/Throat:      Mouth: Mucous membranes are moist.   Eyes:      Pupils: Pupils are equal, round, and reactive to light.   Pulmonary:      Effort: Pulmonary effort is normal.   Abdominal:      General: There is no distension.   Musculoskeletal:      Cervical back: Neck supple.   Skin:     General: Skin is warm and dry.      Findings: Rash present. Rash is urticarial.   Neurological:      General: No focal deficit present.      Mental Status: She is alert and oriented to person, place, and time.   Psychiatric:         Mood and Affect: Mood normal.         Behavior: Behavior normal.                        Medical Decision Making:      Comorbidities that affect care:    None    External Notes reviewed:    None      The following orders were placed and all results were independently analyzed by me:  No orders of the defined types were placed in this encounter.      Medications Given in the Emergency Department:  Medications   methylPREDNISolone sodium succinate (SOLU-Medrol) 125 mg in sterile water (preservative free) 2 mL (125 mg Intravenous Given 6/2/25 2328)   famotidine (PEPCID) injection 20 mg (20 mg Intravenous Given 6/2/25 2331)   diphenhydrAMINE (BENADRYL) injection 25 mg (25 mg Intravenous Given 6/2/25 2329)   sodium chloride 0.9 % bolus 1,000 mL (1,000 mL Intravenous New Bag 6/3/25 0000)        ED Course:    The patient was initially evaluated in the triage area where orders were placed. The patient was later dispositioned by Alyce B Seaver, APRN.      The patient was advised to stay for completion of workup which includes but is not limited to communication of labs and radiological results, reassessment and plan. The patient was advised that leaving prior to disposition by a provider could result in critical findings that are not " communicated to the patient.          Labs:    Lab Results (last 24 hours)       ** No results found for the last 24 hours. **             Imaging:    No Radiology Exams Resulted Within Past 24 Hours      Differential Diagnosis and Discussion:      Rash: Differential diagnosis includes but is not limited to sepsis, cellulitis, [place] Spotted Fever, meningitis, meningococcemia, Varicella, Strep infection, dermatitis, allergic reaction, Lyme disease, and toxic shock syndrome.    PROCEDURES:        No orders to display        Procedures    MDM         Patient Care Considerations:    ANTIBIOTICS: I considered prescribing antibiotics as an outpatient however no bacterial focus of infection was found.      Consultants/Shared Management Plan:    None    Social Determinants of Health:    Patient is independent, reliable, and has access to care.       Disposition and Care Coordination:    Discharged: I considered escalation of care by admitting this patient to the hospital, however the patients vital signs have remained within normal limits, the patient is stable on room air, has adequate follow-up, there was no significant acute findings on workup, patient's rash has resolved, and the patient verbalized they will be compliant with the treatment plan.    I have explained the patient´s condition, diagnoses and treatment plan based on the information available to me at this time. I have answered questions and addressed any concerns. The patient has a good  understanding of the patient´s diagnosis, condition, and treatment plan as can be expected at this point. The vital signs have been stable. The patient´s condition is stable and appropriate for discharge from the emergency department.      The patient will pursue further outpatient evaluation with the primary care physician or other designated or consulting physician as outlined in the discharge instructions. They are agreeable to this plan of care and follow-up  instructions have been explained in detail. The patient has received these instructions in written format and has expressed an understanding of the discharge instructions. The patient is aware that any significant change in condition or worsening of symptoms should prompt an immediate return to this or the closest emergency department or call to 911.  I have explained discharge medications and the need for follow up with the patient/caretakers. This was also printed in the discharge instructions. Patient was discharged with the following medications and follow up:      Medication List        New Prescriptions      cetirizine 10 MG tablet  Commonly known as: zyrTEC  Take 1 tablet by mouth Daily for 7 days.     methylPREDNISolone 4 MG dose pack  Commonly known as: MEDROL  Take 6 tablets by mouth Daily for 1 day, THEN 5 tablets Daily for 1 day, THEN 4 tablets Daily for 1 day, THEN 3 tablets Daily for 1 day, THEN 2 tablets Daily for 1 day, THEN 1 tablet Daily for 1 day. Take as directed on package instructions.  Start taking on: Debbie 3, 2025               Where to Get Your Medications        These medications were sent to [facility]/pharmacy #[id number] - [place], [place] - [address] - [phone] [facility] [phone]   [address] [place] [address]      Hours: 24-hours Phone: [phone]   cetirizine 10 MG tablet  methylPREDNISolone 4 MG dose pack      Nicolas Velasquez MD  [address]  [place] [address]  [phone]             Final diagnoses:   Allergic reaction, initial encounter   Hives        ED Disposition       ED Disposition   Discharge    Condition   Stable    Comment   --               This medical record created using voice recognition software.             Seaver, Alyce B, APRN  06/03/25 0102

## 2025-08-07 ENCOUNTER — TRANSCRIBE ORDERS (OUTPATIENT)
Dept: ADMINISTRATIVE | Facility: HOSPITAL | Age: 80
End: 2025-08-07
Payer: MEDICARE

## 2025-08-07 DIAGNOSIS — N13.1 HYDRONEPHROSIS WITH URETERAL STRICTURE: Primary | ICD-10-CM

## 2025-08-08 ENCOUNTER — TELEPHONE (OUTPATIENT)
Dept: ORTHOPEDIC SURGERY | Facility: CLINIC | Age: 80
End: 2025-08-08
Payer: MEDICARE

## 2025-08-13 ENCOUNTER — TELEPHONE (OUTPATIENT)
Dept: ORTHOPEDIC SURGERY | Facility: CLINIC | Age: 80
End: 2025-08-13
Payer: MEDICARE

## 2025-08-25 ENCOUNTER — OFFICE VISIT (OUTPATIENT)
Dept: ORTHOPEDIC SURGERY | Facility: CLINIC | Age: 80
End: 2025-08-25
Payer: MEDICARE

## 2025-08-25 VITALS
BODY MASS INDEX: 20.98 KG/M2 | HEART RATE: 80 BPM | SYSTOLIC BLOOD PRESSURE: 143 MMHG | OXYGEN SATURATION: 96 % | HEIGHT: 68 IN | DIASTOLIC BLOOD PRESSURE: 82 MMHG | WEIGHT: 138.45 LBS

## 2025-08-25 DIAGNOSIS — M17.12 PRIMARY OSTEOARTHRITIS OF LEFT KNEE: Primary | ICD-10-CM

## 2025-08-25 PROCEDURE — 1160F RVW MEDS BY RX/DR IN RCRD: CPT | Performed by: PHYSICIAN ASSISTANT

## 2025-08-25 PROCEDURE — 20610 DRAIN/INJ JOINT/BURSA W/O US: CPT | Performed by: PHYSICIAN ASSISTANT

## 2025-08-25 PROCEDURE — 1159F MED LIST DOCD IN RCRD: CPT | Performed by: PHYSICIAN ASSISTANT

## (undated) DEVICE — ADHS SKIN SURG TISS VISC PREMIERPRO EXOFIN HI/VISC FAST/DRY

## (undated) DEVICE — SLV SCD KN/LEN ADJ EXPRSS BLENDED MD 1P/U

## (undated) DEVICE — SUT MNCRYL 4/0 PS2 18 IN

## (undated) DEVICE — Device: Brand: DEFENDO AIR/WATER/SUCTION AND BIOPSY VALVE

## (undated) DEVICE — SOLIDIFIER LIQLOC PLS 1500CC BT

## (undated) DEVICE — SINGLE-USE BIOPSY FORCEPS: Brand: RADIAL JAW 4

## (undated) DEVICE — CONN JET HYDRA H20 AUXILIARY DISP

## (undated) DEVICE — LINER SURG CANSTR SXN S/RIGD 1500CC

## (undated) DEVICE — PENCL SMOKE/EVAC MEGADYNE TELESCP 10FT

## (undated) DEVICE — SOL IRRG H2O PL/BG 1000ML STRL

## (undated) DEVICE — SOL IRR NACL 0.9PCT BT 1000ML

## (undated) DEVICE — GLV SURG BIOGEL LTX PF 7 1/2

## (undated) DEVICE — Device

## (undated) DEVICE — VASCULAR ACCESS-LF: Brand: MEDLINE INDUSTRIES, INC.

## (undated) DEVICE — CVR PROB ULTRASND CIVFLEX GEN/PURP TELESCP/FOLD 5.5X58IN LF

## (undated) DEVICE — DEV INFL CRE STERIFLATE 60CC DISP

## (undated) DEVICE — ESOPHAGEAL BALLOON DILATATION CATHETER: Brand: CRE FIXED WIRE

## (undated) DEVICE — INTENDED FOR TISSUE SEPARATION, AND OTHER PROCEDURES THAT REQUIRE A SHARP SURGICAL BLADE TO PUNCTURE OR CUT.: Brand: BARD-PARKER ® CARBON RIB-BACK BLADES

## (undated) DEVICE — BLCK/BITE BLOX WO/DENTL/RIM W/STRAP 54F

## (undated) DEVICE — ANTIBACTERIAL VIOLET BRAIDED (POLYGLACTIN 910), SYNTHETIC ABSORBABLE SUTURE: Brand: COATED VICRYL